# Patient Record
Sex: FEMALE | Race: BLACK OR AFRICAN AMERICAN | Employment: OTHER | URBAN - METROPOLITAN AREA
[De-identification: names, ages, dates, MRNs, and addresses within clinical notes are randomized per-mention and may not be internally consistent; named-entity substitution may affect disease eponyms.]

---

## 2017-02-20 RX ORDER — ALPRAZOLAM 0.5 MG/1
TABLET ORAL
Qty: 30 TAB | Refills: 3 | OUTPATIENT
Start: 2017-02-20 | End: 2017-06-24 | Stop reason: SDUPTHER

## 2017-03-13 ENCOUNTER — OFFICE VISIT (OUTPATIENT)
Dept: FAMILY MEDICINE CLINIC | Age: 62
End: 2017-03-13

## 2017-03-13 VITALS
BODY MASS INDEX: 39.05 KG/M2 | SYSTOLIC BLOOD PRESSURE: 100 MMHG | OXYGEN SATURATION: 96 % | TEMPERATURE: 101.2 F | HEIGHT: 62 IN | WEIGHT: 212.2 LBS | RESPIRATION RATE: 16 BRPM | HEART RATE: 68 BPM | DIASTOLIC BLOOD PRESSURE: 60 MMHG

## 2017-03-13 DIAGNOSIS — Z51.81 ENCOUNTER FOR MEDICATION MONITORING: ICD-10-CM

## 2017-03-13 DIAGNOSIS — I10 ESSENTIAL HYPERTENSION: Primary | ICD-10-CM

## 2017-03-13 DIAGNOSIS — E78.2 MIXED HYPERLIPIDEMIA: ICD-10-CM

## 2017-03-13 DIAGNOSIS — E11.9 TYPE 2 DIABETES MELLITUS WITHOUT COMPLICATION, UNSPECIFIED LONG TERM INSULIN USE STATUS: ICD-10-CM

## 2017-03-13 DIAGNOSIS — J11.1 INFLUENZA: ICD-10-CM

## 2017-03-13 DIAGNOSIS — J06.9 UPPER RESPIRATORY TRACT INFECTION, UNSPECIFIED TYPE: ICD-10-CM

## 2017-03-13 LAB
BILIRUB UR QL STRIP: NORMAL
FLUAV+FLUBV AG NOSE QL IA.RAPID: NEGATIVE POS/NEG
FLUAV+FLUBV AG NOSE QL IA.RAPID: POSITIVE POS/NEG
GLUCOSE POC: 171 MG/DL
GLUCOSE UR-MCNC: NEGATIVE MG/DL
HBA1C MFR BLD HPLC: 6.7 %
KETONES P FAST UR STRIP-MCNC: NORMAL MG/DL
PH UR STRIP: 5 [PH] (ref 4.6–8)
PROT UR QL STRIP: NORMAL MG/DL
S PYO AG THROAT QL: NEGATIVE
SP GR UR STRIP: 1.01 (ref 1–1.03)
UA UROBILINOGEN AMB POC: NORMAL (ref 0.2–1)
URINALYSIS CLARITY POC: CLEAR
URINALYSIS COLOR POC: YELLOW
URINE BLOOD POC: NORMAL
URINE LEUKOCYTES POC: NORMAL
URINE NITRITES POC: NEGATIVE
VALID INTERNAL CONTROL?: YES
VALID INTERNAL CONTROL?: YES

## 2017-03-13 RX ORDER — CYCLOBENZAPRINE HCL 5 MG
TABLET ORAL
Refills: 0 | COMMUNITY
Start: 2016-12-29 | End: 2017-08-02

## 2017-03-13 RX ORDER — PROMETHAZINE HYDROCHLORIDE AND DEXTROMETHORPHAN HYDROBROMIDE 6.25; 15 MG/5ML; MG/5ML
5 SYRUP ORAL
Qty: 240 ML | Refills: 1 | Status: SHIPPED | OUTPATIENT
Start: 2017-03-13 | End: 2017-08-02

## 2017-03-13 RX ORDER — OSELTAMIVIR PHOSPHATE 75 MG/1
75 CAPSULE ORAL 2 TIMES DAILY
Qty: 10 CAP | Refills: 0 | Status: SHIPPED | OUTPATIENT
Start: 2017-03-13 | End: 2017-03-18

## 2017-03-13 NOTE — PROGRESS NOTES
HISTORY OF PRESENT ILLNESS  Waleska Benito is a 63 y.o. female. HPI   \Follow up on chronic medical problems. C/o malaise and body aches for the past few days. Also some nasal congestion and dry cough. Has had fever and chills noted. Throat is scratchy. Has post nasal drainage. Intermittent headache. No chest pain or SOB. No wheezing noted. HTN follow up:  Compliant w/ meds, low salt diet, and exercise. No home bp monitoring. No swelling, headache or dizziness. No chest pain, SOB, palpitations. Otherwise feeling well since the last visit. DM type II follow up:  Compliant w/ meds, diabetic diet, and exercise. [facility]ended class [facility] the [facility]. Was very helpful with her diet. Obtains home glucose monitoring averaging 100-140. Checks BS daily on most days and prn. Pt does not have BS log at visit today. No Rf needed for today. Denies any tingling sensation, polyuria and polydipsia. No blurred vision. No significant weight changes. Feeling well since last OV. Hypercholesterolemia follow up:  Compliant w/ low fat, low cholesterol diet. Exercising some. Off of Lipitor d/t muscle aching. Patient Active Problem List   Diagnosis Code    DJD (degenerative joint disease) back M19.90    Hypokalemia E87.6    History of hysterectomy, supracervical Z90.711    Mixed hyperlipidemia E78.2    Rotator cuff tear M75.100    Unspecified vitamin D deficiency E55.9    Essential hypertension I10    Type 2 diabetes mellitus without complication ([facility] [address] J43.4    Other osteoarthritis of spine, lumbar region M47.896    Encounter for medication monitoring Z51.81    Rotator cuff arthropathy M12.819    S/P rotator cuff repair D64.869       Current Outpatient Prescriptions   Medication Sig Dispense Refill    cyclobenzaprine (FLEXERIL) 5 mg tablet Take 1 tab 3 times daily as needed  0    oseltamivir (TAMIFLU) 75 mg capsule Take 1 Cap by mouth two (2) times a day for 5 days.  10 Cap 0    promethazine-dextromethorphan (PROMETHAZINE-DM) 6.25-15 mg/5 mL syrup Take 5 mL by mouth every six (6) hours as needed for Cough. 240 mL 1    ALPRAZolam (XANAX) 0.5 mg tablet TAKE 1 TABLET BY MOUTH ONCE DAILY EVERY NIGHT AT BEDTIME AS NEEDED FOR SLEEP 30 Tab 3    glipiZIDE SR (GLUCOTROL XL) 5 mg CR tablet TAKE 1 TABLET BY MOUTH ONCE DAILY 30 Tab 11    naproxen sodium (ALEVE) 220 mg cap Take 1 Tab by mouth two (2) times daily as needed.  glucose blood VI test strips (ACCU-CHEK SMARTVIEW TEST STRIP) strip Use to check blood sugar daily and prn. 100 Strip 3    Lancets (ACCU-CHEK FASTCLIX) misc Use to obtain blood sample for glucose testing daily and prn. 100 Each 3    acetaminophen (TYLENOL) 325 mg tablet Take 2 Tabs by mouth every four (4) hours as needed for Pain. 20 Tab 0    metFORMIN ER (GLUCOPHAGE XR) 500 mg tablet Take 2 Tabs by mouth two (2) times daily (with meals). 360 Tab 3    valsartan-hydrochlorothiazide (DIOVAN-HCT) 320-25 mg per tablet Take 1 Tab by mouth daily. 30 Tab 11    aspirin 81 mg tablet Take 81 mg by mouth daily.          Allergies   Allergen Reactions    Codeine Nausea and Vomiting    Keflex [Cephalexin] Nausea and Vomiting    Lipitor [Atorvastatin] Myalgia    Percocet [Oxycodone-Acetaminophen] Nausea and Vomiting    Tramadol Other (comments)     dizziness    Vicodin [Hydrocodone-Acetaminophen] Nausea and Vomiting       Past Medical History:   Diagnosis Date    DJD (degenerative joint disease) back 4/10/2010    and neck    DM (diabetes mellitus) ([facility] [address] 4/10/2010    Elevated cholesterol 4/10/2010    HTN (hypertension) 4/10/2010    Hypokalemia 4/10/2010    Unspecified vitamin D deficiency        Past Surgical History:   Procedure Laterality Date    HX HYSTERECTOMY  6/14/04    [address] [facility] [address]    HX MOHS PROCEDURES  11/10/2015    right shoulder    HX ORTHOPAEDIC  4/29/13    LEFT ROTATOR CUFF REPAIR      HX ORTHOPAEDIC  06/20/13    shoulder manipulation for frozen shoulder    HX ORTHOPAEDIC  8/5/2014    pt states she had injection by ortho in right groin    HX ORTHOPAEDIC  2/16/2016    right shoulder manipulation    HX ORTHOPAEDIC  11/10/2015    right rotator cuff    HX TONSILLECTOMY      age 31    SC COLONOSCOPY FLX DX W/COLLJ SPEC WHEN PFRMD  03/28/2008    Dr Kailey Pinto       Family History   Problem Relation Age of Onset    Hypertension Mother     Diabetes Mother     Kidney Disease Mother     Heart Attack Father     Bleeding Prob Father      taking coumadin    Bipolar Disorder Sister     Hypertension Sister     No Known Problems Sister        Social History   Substance Use Topics    Smoking status: Never Smoker    Smokeless tobacco: Never Used    Alcohol use No        Lab Results  Component Value Date/Time   WBC 6.8 03/02/2016 08:40 AM   HGB 12.6 03/02/2016 08:40 AM   HCT 37.9 03/02/2016 08:40 AM   PLATELET 071 16/45/9734 08:40 AM   MCV 81 03/02/2016 08:40 AM       Lab Results  Component Value Date/Time   Cholesterol, total 189 11/11/2016 08:09 AM   HDL Cholesterol 51 11/11/2016 08:09 AM   LDL, calculated 126 11/11/2016 08:09 AM   Triglyceride 60 11/11/2016 08:09 AM   CHOL/HDL Ratio 3.9 05/04/2010 04:50 PM       Lab Results  Component Value Date/Time   TSH 1.830 10/01/2014 11:43 AM   TSH 1.310 04/24/2012 03:55 PM      Lab Results   Component Value Date/Time    Sodium 141 11/11/2016 08:09 AM    Potassium 4.6 11/11/2016 08:09 AM    Chloride 100 11/11/2016 08:09 AM    CO2 26 11/11/2016 08:09 AM    Anion gap 11 06/12/2014 09:23 PM    Glucose 112 11/11/2016 08:09 AM    BUN 13 11/11/2016 08:09 AM    Creatinine 0.69 11/11/2016 08:09 AM    BUN/Creatinine ratio 19 11/11/2016 08:09 AM    GFR est  11/11/2016 08:09 AM    GFR est non-AA 96 11/11/2016 08:09 AM    Calcium 9.3 11/11/2016 08:09 AM    Bilirubin, total 0.5 11/11/2016 08:09 AM    ALT (SGPT) 14 11/11/2016 08:09 AM    AST (SGOT) 17 11/11/2016 08:09 AM    Alk.  phosphatase 104 11/11/2016 08:09 AM    Protein, total 6.8 11/11/2016 08:09 AM    Albumin 4.5 11/11/2016 08:09 AM    Globulin 3.9 06/12/2014 09:23 PM    A-G Ratio 2.0 11/11/2016 08:09 AM      Lab Results   Component Value Date/Time    Hemoglobin A1c 6.9 02/20/2015 09:57 AM    Hemoglobin A1c (POC) 6.3 11/11/2016 08:09 AM         Review of Systems   Constitutional: Positive for chills, fever and malaise/fatigue. HENT: Positive for congestion. Eyes: Negative for blurred vision. Respiratory: Positive for cough. Negative for sputum production and shortness of breath. Cardiovascular: Negative for chest pain, palpitations and leg swelling. Gastrointestinal: Negative for abdominal pain, constipation and heartburn. Genitourinary: Negative for dysuria, frequency and urgency. Musculoskeletal: Negative for back pain and joint pain. Neurological: Positive for headaches. Negative for dizziness and tingling. Endo/Heme/Allergies: Positive for environmental allergies. Psychiatric/Behavioral: Negative for depression. The patient does not have insomnia. Physical Exam   Constitutional: She appears well-developed and well-nourished. /60 (BP 1 Location: Left arm, BP Patient Position: Sitting)  Pulse 68  Temp (!) 101.2 °F (38.4 °C) (Oral)   Resp 16  Ht 5' 2\" (1.575 m)  Wt 212 lb 3.2 oz (96.3 kg)  LMP 06/14/2004  SpO2 96%   L/min  BMI 38.81 kg/m2     HENT:   Right Ear: Tympanic membrane and ear canal normal.   Left Ear: Tympanic membrane and ear canal normal.   Nose: Mucosal edema and rhinorrhea present. Mouth/Throat: Mucous membranes are normal. Posterior oropharyngeal erythema present. No oropharyngeal exudate. Neck: Trachea normal, normal range of motion and full passive range of motion without pain. Neck supple. No edema present. No thyromegaly present. Cardiovascular: Normal rate and regular rhythm. No murmur heard. Pulmonary/Chest: Effort normal. She has no wheezes. She has rhonchi. Abdominal: Soft.  Normal appearance and bowel sounds are normal. There is no tenderness. Musculoskeletal: Normal range of motion. She exhibits no edema. Lymphadenopathy:     She has no cervical adenopathy. Skin: Skin is warm and dry. Psychiatric: She has a normal mood and affect. Nursing note and vitals reviewed. ASSESSMENT and PLAN  Storm Valadez was seen today for hypertension, diabetes, nasal congestion, fever and sore throat. Diagnoses and all orders for this visit:    Essential hypertension  Stable     Type 2 diabetes mellitus without complication, unspecified long term insulin use status (HCC)  -     AMB POC HEMOGLOBIN A1C  -     AMB POC GLUCOSE, QUANTITATIVE, BLOOD  -     MICROALBUMIN, UR, RAND W/ MICROALBUMIN/CREA RATIO  -     AMB POC URINALYSIS DIP STICK AUTO W/O MICRO    Mixed hyperlipidemia  -     LIPID PANEL    Encounter for medication monitoring  -     METABOLIC PANEL, BASIC  -     Cancel: CBC W/O DIFF  -     AMB POC COMPLETE CBC, AUTOMATED    Upper respiratory tract infection, unspecified type  -     AMB POC RAPID STREP A  -     XR CHEST PA LAT; Future  -     promethazine-dextromethorphan (PROMETHAZINE-DM) 6.25-15 mg/5 mL syrup; Take 5 mL by mouth every six (6) hours as needed for Cough. Influenza  -     AMB POC RENE INFLUENZA A/B TEST  -  Positive   -     XR CHEST PA LAT; Future  -     oseltamivir (TAMIFLU) 75 mg capsule; Take 1 Cap by mouth two (2) times a day for 5 days. Follow-up Disposition:  Return in about 4 months (around 7/13/2017). reviewed diet, exercise and weight control  cardiovascular risk and specific lipid/LDL goals reviewed  reviewed medications and side effects in detail  specific diabetic recommendations: low cholesterol diet, weight control and daily exercise discussed, home glucose monitoring emphasized and glycohemoglobin and other lab monitoring discussed     I have discussed diagnosis listed in this note with pt and/or family.  I have discussed treatment plans and options and the risk/benefit analysis of those options, including safe use of medications and possible medication side effects. Through the use of shared decision making we have agreed to the above plan. The patient has received an after-visit summary and questions were answered concerning future plans and follow up. Advise pt of any urgent changes then to proceed to the ER.

## 2017-03-13 NOTE — PROGRESS NOTES
Chief Complaint   Patient presents with    Hypertension     follow up    Diabetes     follow up    Nasal Congestion     x 2 days    Fever     ptstates she had a fever yesterday    Sore Throat     x 2 days           Hgb A1C: 11/11/2016    Lipid: 11/11/2016    CMP 11/11/2016    Microalbumin: 3/2/2016    Eye exam: 9/30/2016 by Dr. Yan Ocampo.     Colonoscopy: 3/28/2008 by Dr. Jensen Marking repeat in 10 yrs    Mammogram: 10/12/2016

## 2017-03-13 NOTE — LETTER
NOTIFICATION RETURN TO WORK / SCHOOL 
 
3/13/2017 10:21 AM 
 
Ms. Tex Merlin [address] [place] [address] [phone] To Whom It May Concern: 
 
Tex Merlin is currently under the care of [facility]. She will return to work/school on: March 16, 2017 If there are questions or concerns please have the patient contact our office. Sincerely, Amee Waters MD

## 2017-03-14 LAB
ALBUMIN/CREAT UR: 27.5 MG/G CREAT (ref 0–30)
BASOPHILS # BLD AUTO: 0 X10E3/UL (ref 0–0.2)
BASOPHILS NFR BLD AUTO: 0 %
BUN SERPL-MCNC: 10 MG/DL (ref 6–24)
BUN/CREAT SERPL: 11 (ref 9–23)
CALCIUM SERPL-MCNC: 9.2 MG/DL (ref 8.7–10.2)
CHLORIDE SERPL-SCNC: 97 MMOL/L (ref 96–106)
CHOLEST SERPL-MCNC: 171 MG/DL (ref 100–199)
CO2 SERPL-SCNC: 23 MMOL/L (ref 18–29)
CREAT SERPL-MCNC: 0.94 MG/DL (ref 0.57–1)
CREAT UR-MCNC: 180.4 MG/DL
EOSINOPHIL # BLD AUTO: 0 X10E3/UL (ref 0–0.4)
EOSINOPHIL NFR BLD AUTO: 0 %
ERYTHROCYTE [DISTWIDTH] IN BLOOD BY AUTOMATED COUNT: 15.5 % (ref 12.3–15.4)
GLUCOSE SERPL-MCNC: 155 MG/DL (ref 65–99)
HCT VFR BLD AUTO: 39.7 % (ref 34–46.6)
HDLC SERPL-MCNC: 40 MG/DL
HGB BLD-MCNC: 13.3 G/DL (ref 11.1–15.9)
IMM GRANULOCYTES # BLD: 0 X10E3/UL (ref 0–0.1)
IMM GRANULOCYTES NFR BLD: 0 %
INTERPRETATION, 910389: NORMAL
LDLC SERPL CALC-MCNC: 114 MG/DL (ref 0–99)
LYMPHOCYTES # BLD AUTO: 0.8 X10E3/UL (ref 0.7–3.1)
LYMPHOCYTES NFR BLD AUTO: 10 %
Lab: NORMAL
MCH RBC QN AUTO: 27.3 PG (ref 26.6–33)
MCHC RBC AUTO-ENTMCNC: 33.5 G/DL (ref 31.5–35.7)
MCV RBC AUTO: 81 FL (ref 79–97)
MICROALBUMIN UR-MCNC: 49.6 UG/ML
MONOCYTES # BLD AUTO: 1.2 X10E3/UL (ref 0.1–0.9)
MONOCYTES NFR BLD AUTO: 15 %
NEUTROPHILS # BLD AUTO: 6 X10E3/UL (ref 1.4–7)
NEUTROPHILS NFR BLD AUTO: 75 %
PLATELET # BLD AUTO: 223 X10E3/UL (ref 150–379)
POTASSIUM SERPL-SCNC: 3.9 MMOL/L (ref 3.5–5.2)
RBC # BLD AUTO: 4.88 X10E6/UL (ref 3.77–5.28)
SODIUM SERPL-SCNC: 140 MMOL/L (ref 134–144)
TRIGL SERPL-MCNC: 84 MG/DL (ref 0–149)
VLDLC SERPL CALC-MCNC: 17 MG/DL (ref 5–40)
WBC # BLD AUTO: 8 X10E3/UL (ref 3.4–10.8)

## 2017-08-02 ENCOUNTER — OFFICE VISIT (OUTPATIENT)
Dept: FAMILY MEDICINE CLINIC | Age: 62
End: 2017-08-02

## 2017-08-02 VITALS
RESPIRATION RATE: 16 BRPM | WEIGHT: 204.6 LBS | OXYGEN SATURATION: 97 % | HEIGHT: 62 IN | HEART RATE: 69 BPM | DIASTOLIC BLOOD PRESSURE: 60 MMHG | BODY MASS INDEX: 37.65 KG/M2 | SYSTOLIC BLOOD PRESSURE: 118 MMHG | TEMPERATURE: 97.5 F

## 2017-08-02 DIAGNOSIS — I10 ESSENTIAL HYPERTENSION: Primary | ICD-10-CM

## 2017-08-02 DIAGNOSIS — Z51.81 ENCOUNTER FOR MEDICATION MONITORING: ICD-10-CM

## 2017-08-02 DIAGNOSIS — R53.83 FATIGUE, UNSPECIFIED TYPE: ICD-10-CM

## 2017-08-02 DIAGNOSIS — E78.2 MIXED HYPERLIPIDEMIA: ICD-10-CM

## 2017-08-02 DIAGNOSIS — E11.9 TYPE 2 DIABETES MELLITUS WITHOUT COMPLICATION, UNSPECIFIED LONG TERM INSULIN USE STATUS: ICD-10-CM

## 2017-08-02 DIAGNOSIS — B35.1 TOENAIL FUNGUS: ICD-10-CM

## 2017-08-02 LAB
GLUCOSE POC: 97 MG/DL
HBA1C MFR BLD HPLC: 6.3 %

## 2017-08-02 RX ORDER — TERBINAFINE HYDROCHLORIDE 250 MG/1
250 TABLET ORAL DAILY
Qty: 30 TAB | Refills: 1 | Status: SHIPPED | OUTPATIENT
Start: 2017-08-02 | End: 2018-03-30

## 2017-08-02 NOTE — PROGRESS NOTES
HISTORY OF PRESENT ILLNESS  Sammi Gonzalez is a 63 y.o. female. HPI   Follow up on chronic medical problems. C/o fatigue but also notes that she is not getting enough sleep. Has been busy and stressed with home and work. May average only about 5 to 6 hours of sleep. She is not exercising. HTN follow up:  Compliant w/ meds, low salt diet, and exercise. No home bp monitoring. No swelling, headache or dizziness. No chest pain, SOB, palpitations. Otherwise feeling well since the last visit. DM type II follow up:  Compliant w/ meds, diabetic diet. Obtains home glucose monitoring averaging 100-140s. Checks BS daily on most days and prn. Pt does not have BS log at visit today. No Rf needed for today. Denies any tingling sensation, polyuria and polydipsia. No blurred vision. No significant weight changes. Feeling well since last OV. Hypercholesterolemia follow up:  Compliant w/ low fat, low cholesterol diet. Off of Lipitor d/t muscle aching. Patient Active Problem List   Diagnosis Code    DJD (degenerative joint disease) back M19.90    Hypokalemia E87.6    History of hysterectomy, supracervical Z90.711    Mixed hyperlipidemia E78.2    Rotator cuff tear M75.100    Unspecified vitamin D deficiency E55.9    Essential hypertension I10    Type 2 diabetes mellitus without complication ([facility] [address] I54.2    Other osteoarthritis of spine, lumbar region M47.896    Encounter for medication monitoring Z51.81    Rotator cuff arthropathy M12.819    S/P rotator cuff repair C07.327       Current Outpatient Prescriptions   Medication Sig Dispense Refill    VITAMIN B COMPLEX PO Take 1 Tab by mouth daily.  terbinafine HCl (LAMISIL) 250 mg tablet Take 1 Tab by mouth daily.  Indications: TOENAIL ONYCHOMYCOSIS 30 Tab 1    ALPRAZolam (XANAX) 0.5 mg tablet TAKE 1 TABLET BY MOUTH ONCE DAILY EVERY NIGHT AT BEDTIME AS NEEDED FOR SLEEP 30 Tab 3    glipiZIDE SR (GLUCOTROL XL) 5 mg CR tablet TAKE 1 TABLET BY MOUTH ONCE DAILY 30 Tab 11    naproxen sodium (ALEVE) 220 mg cap Take 1 Tab by mouth two (2) times daily as needed.  glucose blood VI test strips (ACCU-CHEK SMARTVIEW TEST STRIP) strip Use to check blood sugar daily and prn. 100 Strip 3    Lancets (ACCU-CHEK FASTCLIX) misc Use to obtain blood sample for glucose testing daily and prn. 100 Each 3    acetaminophen (TYLENOL) 325 mg tablet Take 2 Tabs by mouth every four (4) hours as needed for Pain. 20 Tab 0    metFORMIN ER (GLUCOPHAGE XR) 500 mg tablet Take 2 Tabs by mouth two (2) times daily (with meals). 360 Tab 3    valsartan-hydrochlorothiazide (DIOVAN-HCT) 320-25 mg per tablet Take 1 Tab by mouth daily. 30 Tab 11    aspirin 81 mg tablet Take 81 mg by mouth daily.          Allergies   Allergen Reactions    Codeine Nausea and Vomiting    Keflex [Cephalexin] Nausea and Vomiting    Lipitor [Atorvastatin] Myalgia    Percocet [Oxycodone-Acetaminophen] Nausea and Vomiting    Tramadol Other (comments)     dizziness    Vicodin [Hydrocodone-Acetaminophen] Nausea and Vomiting         Past Medical History:   Diagnosis Date    DJD (degenerative joint disease) back 4/10/2010    and neck    DM (diabetes mellitus) ([facility] [address] 4/10/2010    Elevated cholesterol 4/10/2010    HTN (hypertension) 4/10/2010    Hypokalemia 4/10/2010    Unspecified vitamin D deficiency          Past Surgical History:   Procedure Laterality Date    HX HYST[facility]CTOMY  6/14/04    [facility]    HX MOHS PROCEDURES  11/10/2015    right shoulder    HX ORTHOPAEDIC  4/29/13    LEFT ROTATOR CUFF REPAIR      HX ORTHOPAEDIC  06/20/13    shoulder manipulation for frozen shoulder    HX ORTHOPAEDIC  8/5/2014    pt states she had injection by ortho in right groin    HX ORTHOPAEDIC  2/16/2016    right shoulder manipulation    HX ORTHOPAEDIC  11/10/2015    right rotator cuff    HX TONSILLECTOMY      age 31    DE COLONOSCOPY FLX DX W/COLLJ SPEC WHEN PFRMD  03/28/2008    Dr Alexander Suero         Family History Problem Relation Age of Onset    Hypertension Mother     Diabetes Mother     Kidney Disease Mother     Heart Attack Father     Bleeding Prob Father      taking coumadin    Bipolar Disorder Sister     Hypertension Sister     No Known Problems Sister        Social History   Substance Use Topics    Smoking status: Never Smoker    Smokeless tobacco: Never Used    Alcohol use No        Lab Results   Component Value Date/Time    WBC 8.0 03/13/2017 10:10 AM    HGB 13.3 03/13/2017 10:10 AM    HCT 39.7 03/13/2017 10:10 AM    PLATELET 991 82/11/6948 10:10 AM    MCV 81 03/13/2017 10:10 AM       Lab Results   Component Value Date/Time    Cholesterol, total 171 03/13/2017 10:10 AM    HDL Cholesterol 40 03/13/2017 10:10 AM    LDL, calculated 114 03/13/2017 10:10 AM    Triglyceride 84 03/13/2017 10:10 AM    CHOL/HDL Ratio 3.9 05/04/2010 04:50 PM       Lab Results   Component Value Date/Time    TSH 1.830 10/01/2014 11:43 AM    TSH 1.310 04/24/2012 03:55 PM      Lab Results   Component Value Date/Time    Sodium 140 03/13/2017 10:10 AM    Potassium 3.9 03/13/2017 10:10 AM    Chloride 97 03/13/2017 10:10 AM    CO2 23 03/13/2017 10:10 AM    Anion gap 11 06/12/2014 09:23 PM    Glucose 155 03/13/2017 10:10 AM    BUN 10 03/13/2017 10:10 AM    Creatinine 0.94 03/13/2017 10:10 AM    BUN/Creatinine ratio 11 03/13/2017 10:10 AM    GFR est AA 77 03/13/2017 10:10 AM    GFR est non-AA 67 03/13/2017 10:10 AM    Calcium 9.2 03/13/2017 10:10 AM    Bilirubin, total 0.5 11/11/2016 08:09 AM    ALT (SGPT) 14 11/11/2016 08:09 AM    AST (SGOT) 17 11/11/2016 08:09 AM    Alk.  phosphatase 104 11/11/2016 08:09 AM    Protein, total 6.8 11/11/2016 08:09 AM    Albumin 4.5 11/11/2016 08:09 AM    Globulin 3.9 06/12/2014 09:23 PM    A-G Ratio 2.0 11/11/2016 08:09 AM      Lab Results   Component Value Date/Time    Hemoglobin A1c 6.9 02/20/2015 09:57 AM    Hemoglobin A1c (POC) 6.3 08/02/2017 09:55 AM         Review of Systems   Constitutional: Negative for malaise/fatigue. HENT: Negative for congestion. Eyes: Negative for blurred vision. Respiratory: Negative for cough and shortness of breath. Cardiovascular: Negative for chest pain, palpitations and leg swelling. Gastrointestinal: Negative for heartburn, abdominal pain and constipation. Genitourinary: Negative for dysuria, urgency and frequency. Musculoskeletal:  Negative for back pain. Neurological: Negative for dizziness, tingling and headaches. Endo/Heme/Allergies: Negative for environmental allergies. Psychiatric/Behavioral: Negative for depression. The patient does not have insomnia. Physical Exam   Constitutional: She appears well-developed and well-nourished. /60 (BP 1 Location: Right arm, BP Patient Position: Sitting)  Pulse 69  Temp 97.5 °F (36.4 °C) (Oral)   Resp 16  Ht 5' 2\" (1.575 m)  Wt 204 lb 9.6 oz (92.8 kg)  LMP 06/14/2004  SpO2 97%  BMI 37.42 kg/m2     HENT:   Right Ear: Tympanic membrane and ear canal normal.   Left Ear: Tympanic membrane and ear canal normal.   Nose: No mucosal edema or rhinorrhea. Mouth/Throat: Oropharynx is clear and moist and mucous membranes are normal.   Neck: Normal range of motion. Neck supple. No thyromegaly present. Cardiovascular: Normal rate and regular rhythm. No murmur heard. Pulmonary/Chest: Effort normal and breath sounds normal.   Abdominal: Soft. Bowel sounds are normal. There is no tenderness. Musculoskeletal: Normal range of motion. She exhibits no edema. Foot exam done . Normal sensation to PP, LT and vibration. Sensation intact to microfilament testing. Pulses intact. No swelling. No skin lesions or sores noted. No tinea present. Toenail fungus right great toenail. Lymphadenopathy:     She has no cervical adenopathy. Skin: Skin is warm and dry. Psychiatric: She has a normal mood and affect. Nursing note and vitals reviewed.     ASSESSMENT and PLAN  Diagnoses and all orders for this visit: 1. Essential hypertension  Stable at goal.      2. Type 2 diabetes mellitus without complication, unspecified long term insulin use status (HCC)  -     AMB POC HEMOGLOBIN A1C  -     AMB POC GLUCOSE, QUANTITATIVE, BLOOD    3. Mixed hyperlipidemia  -     LIPID PANEL    4. Encounter for medication monitoring  -     METABOLIC PANEL, COMPREHENSIVE  -     CBC W/O DIFF    5. Fatigue, unspecified type  -     VITAMIN B12  -     TSH 3RD GENERATION  Increase exercises, weight reduction. Add multivitamin. 6. Toenail fungus  -     terbinafine HCl (LAMISIL) 250 mg tablet; Take 1 Tab by mouth daily. Indications: TOENAIL ONYCHOMYCOSIS  LFTS check in 1 month. Follow-up Disposition:  Return in about 1 month (around 9/2/2017). reviewed diet, exercise and weight control  cardiovascular risk and specific lipid/LDL goals reviewed  reviewed medications and side effects in detail  specific diabetic recommendations: low cholesterol diet, weight control and daily exercise discussed, home glucose monitoring emphasized, foot care discussed and Podiatry visits discussed, annual eye examinations at Ophthalmology discussed and glycohemoglobin and other lab monitoring discussed     I have discussed diagnosis listed in this note with pt and/or family. I have discussed treatment plans and options and the risk/benefit analysis of those options, including safe use of medications and possible medication side effects. Through the use of shared decision making we have agreed to the above plan. The patient has received an after-visit summary and questions were answered concerning future plans and follow up. Advise pt of any urgent changes then to proceed to the ER. Eri Ruffin

## 2017-08-02 NOTE — PROGRESS NOTES
HISTORY OF PRESENT ILLNESS  Sammi Gonzalez is a 63 y.o. female. HPI   \Follow up on chronic medical problems. C/o malaise and body aches for the past few days. Also some nasal congestion and dry cough. Has had fever and chills noted. Throat is scratchy. Has post nasal drainage. Intermittent headache. No chest pain or SOB. No wheezing noted. HTN follow up:  Compliant w/ meds, low salt diet, and exercise. No home bp monitoring. No swelling, headache or dizziness. No chest pain, SOB, palpitations. Otherwise feeling well since the last visit. DM type II follow up:  Compliant w/ meds, diabetic diet, and exercise. [facility]. Was very helpful with her diet. Obtains home glucose monitoring averaging 100-140. Checks BS daily on most days and prn. Pt does not have BS log at visit today. No Rf needed for today. Denies any tingling sensation, polyuria and polydipsia. No blurred vision. No significant weight changes. Feeling well since last OV. Hypercholesterolemia follow up:  Compliant w/ low fat, low cholesterol diet. Exercising some. Off of Lipitor d/t muscle aching. Patient Active Problem List   Diagnosis Code    DJD (degenerative joint disease) back M19.90    Hypokalemia E87.6    History of hysterectomy, supracervical Z90.711    Mixed hyperlipidemia E78.2    Rotator cuff tear M75.100    Unspecified vitamin D deficiency E55.9    Essential hypertension I10    Type 2 diabetes mellitus without complication ([facility] [address] V18.6    Other osteoarthritis of spine, lumbar region M47.896    Encounter for medication monitoring Z51.81    Rotator cuff arthropathy M12.819    S/P rotator cuff repair N57.279       Current Outpatient Prescriptions   Medication Sig Dispense Refill    VITAMIN B COMPLEX PO Take 1 Tab by mouth daily.  terbinafine HCl (LAMISIL) 250 mg tablet Take 1 Tab by mouth daily.  Indications: TOENAIL ONYCHOMYCOSIS 30 Tab 1    ALPRAZolam (XANAX) 0.5 mg tablet TAKE 1 TABLET BY MOUTH ONCE DAILY EVERY NIGHT AT BEDTIME AS NEEDED FOR SLEEP 30 Tab 3    glipiZIDE SR (GLUCOTROL XL) 5 mg CR tablet TAKE 1 TABLET BY MOUTH ONCE DAILY 30 Tab 11    naproxen sodium (ALEVE) 220 mg cap Take 1 Tab by mouth two (2) times daily as needed.  glucose blood VI test strips (ACCU-CHEK SMARTVIEW TEST STRIP) strip Use to check blood sugar daily and prn. 100 Strip 3    Lancets (ACCU-CHEK FASTCLIX) misc Use to obtain blood sample for glucose testing daily and prn. 100 Each 3    acetaminophen (TYLENOL) 325 mg tablet Take 2 Tabs by mouth every four (4) hours as needed for Pain. 20 Tab 0    metFORMIN ER (GLUCOPHAGE XR) 500 mg tablet Take 2 Tabs by mouth two (2) times daily (with meals). 360 Tab 3    valsartan-hydrochlorothiazide (DIOVAN-HCT) 320-25 mg per tablet Take 1 Tab by mouth daily. 30 Tab 11    aspirin 81 mg tablet Take 81 mg by mouth daily.          Allergies   Allergen Reactions    Codeine Nausea and Vomiting    Keflex [Cephalexin] Nausea and Vomiting    Lipitor [Atorvastatin] Myalgia    Percocet [Oxycodone-Acetaminophen] Nausea and Vomiting    Tramadol Other (comments)     dizziness    Vicodin [Hydrocodone-Acetaminophen] Nausea and Vomiting         Past Medical History:   Diagnosis Date    DJD (degenerative joint disease) back 4/10/2010    and neck    DM (diabetes mellitus) ([facility] [address] 4/10/2010    Elevated cholesterol 4/10/2010    HTN (hypertension) 4/10/2010    Hypokalemia 4/10/2010    Unspecified vitamin D deficiency          Past Surgical History:   Procedure Laterality Date    HX HYSTERECTOMY  6/14/04    [address]    HX MOHS PROCEDURES  11/10/2015    right shoulder    HX ORTHOPAEDIC  4/29/13    LEFT ROTATOR CUFF REPAIR      HX ORTHOPAEDIC  06/20/13    shoulder manipulation for frozen shoulder    HX ORTHOPAEDIC  8/5/2014    pt states she had injection by ortho in right groin    HX ORTHOPAEDIC  2/16/2016    right shoulder manipulation    HX ORTHOPAEDIC  11/10/2015    right rotator cuff    HX TONSILLECTOMY      age 31    OH COLONOSCOPY FLX DX W/COLLJ SPEC WHEN PFRMD  03/28/2008    Dr Sarah Beth Denise         Family History   Problem Relation Age of Onset    Hypertension Mother     Diabetes Mother     Kidney Disease Mother     Heart Attack Father     Bleeding Prob Father      taking coumadin    Bipolar Disorder Sister     Hypertension Sister     No Known Problems Sister        Social History   Substance Use Topics    Smoking status: Never Smoker    Smokeless tobacco: Never Used    Alcohol use No        Lab Results   Component Value Date/Time    WBC 8.0 03/13/2017 10:10 AM    HGB 13.3 03/13/2017 10:10 AM    HCT 39.7 03/13/2017 10:10 AM    PLATELET 487 23/33/1602 10:10 AM    MCV 81 03/13/2017 10:10 AM       Lab Results   Component Value Date/Time    Cholesterol, total 171 03/13/2017 10:10 AM    HDL Cholesterol 40 03/13/2017 10:10 AM    LDL, calculated 114 03/13/2017 10:10 AM    Triglyceride 84 03/13/2017 10:10 AM    CHOL/HDL Ratio 3.9 05/04/2010 04:50 PM       Lab Results   Component Value Date/Time    TSH 1.830 10/01/2014 11:43 AM    TSH 1.310 04/24/2012 03:55 PM      Lab Results   Component Value Date/Time    Sodium 140 03/13/2017 10:10 AM    Potassium 3.9 03/13/2017 10:10 AM    Chloride 97 03/13/2017 10:10 AM    CO2 23 03/13/2017 10:10 AM    Anion gap 11 06/12/2014 09:23 PM    Glucose 155 03/13/2017 10:10 AM    BUN 10 03/13/2017 10:10 AM    Creatinine 0.94 03/13/2017 10:10 AM    BUN/Creatinine ratio 11 03/13/2017 10:10 AM    GFR est AA 77 03/13/2017 10:10 AM    GFR est non-AA 67 03/13/2017 10:10 AM    Calcium 9.2 03/13/2017 10:10 AM    Bilirubin, total 0.5 11/11/2016 08:09 AM    ALT (SGPT) 14 11/11/2016 08:09 AM    AST (SGOT) 17 11/11/2016 08:09 AM    Alk.  phosphatase 104 11/11/2016 08:09 AM    Protein, total 6.8 11/11/2016 08:09 AM    Albumin 4.5 11/11/2016 08:09 AM    Globulin 3.9 06/12/2014 09:23 PM    A-G Ratio 2.0 11/11/2016 08:09 AM      Lab Results   Component Value Date/Time Hemoglobin A1c 6.9 02/20/2015 09:57 AM    Hemoglobin A1c (POC) 6.3 08/02/2017 09:55 AM         Review of Systems   Constitutional: Positive for chills, fever and malaise/fatigue. HENT: Positive for congestion. Eyes: Negative for blurred vision. Respiratory: Positive for cough. Negative for sputum production and shortness of breath. Cardiovascular: Negative for chest pain, palpitations and leg swelling. Gastrointestinal: Negative for abdominal pain, constipation and heartburn. Genitourinary: Negative for dysuria, frequency and urgency. Musculoskeletal: Negative for back pain and joint pain. Neurological: Positive for headaches. Negative for dizziness and tingling. Endo/Heme/Allergies: Positive for environmental allergies. Psychiatric/Behavioral: Negative for depression. The patient does not have insomnia. Physical Exam   Constitutional: She appears well-developed and well-nourished. /60 (BP 1 Location: Left arm, BP Patient Position: Sitting)  Pulse 68  Temp (!) 101.2 °F (38.4 °C) (Oral)   Resp 16  Ht 5' 2\" (1.575 m)  Wt 212 lb 3.2 oz (96.3 kg)  LMP 06/14/2004  SpO2 96%   L/min  BMI 38.81 kg/m2     HENT:   Right Ear: Tympanic membrane and ear canal normal.   Left Ear: Tympanic membrane and ear canal normal.   Nose: Mucosal edema and rhinorrhea present. Mouth/Throat: Mucous membranes are normal. Posterior oropharyngeal erythema present. No oropharyngeal exudate. Neck: Trachea normal, normal range of motion and full passive range of motion without pain. Neck supple. No edema present. No thyromegaly present. Cardiovascular: Normal rate and regular rhythm. No murmur heard. Pulmonary/Chest: Effort normal. She has no wheezes. She has rhonchi. Abdominal: Soft. Normal appearance and bowel sounds are normal. There is no tenderness. Musculoskeletal: Normal range of motion. She exhibits no edema. Lymphadenopathy:     She has no cervical adenopathy.    Skin: Skin is warm and dry. Psychiatric: She has a normal mood and affect. Nursing note and vitals reviewed. ASSESSMENT and PLAN  Ronal Robertson was seen today for hypertension, diabetes, nasal congestion, fever and sore throat. Diagnoses and all orders for this visit:    Essential hypertension  Stable     Type 2 diabetes mellitus without complication, unspecified long term insulin use status (HCC)  -     AMB POC HEMOGLOBIN A1C  -     AMB POC GLUCOSE, QUANTITATIVE, BLOOD  -     MICROALBUMIN, UR, RAND W/ MICROALBUMIN/CREA RATIO  -     AMB POC URINALYSIS DIP STICK AUTO W/O MICRO    Mixed hyperlipidemia  -     LIPID PANEL    Encounter for medication monitoring  -     METABOLIC PANEL, BASIC  -     Cancel: CBC W/O DIFF  -     AMB POC COMPLETE CBC, AUTOMATED    Upper respiratory tract infection, unspecified type  -     AMB POC RAPID STREP A  -     XR CHEST PA LAT; Future  -     promethazine-dextromethorphan (PROMETHAZINE-DM) 6.25-15 mg/5 mL syrup; Take 5 mL by mouth every six (6) hours as needed for Cough. Influenza  -     AMB POC RENE INFLUENZA A/B TEST  -  Positive   -     XR CHEST PA LAT; Future  -     oseltamivir (TAMIFLU) 75 mg capsule; Take 1 Cap by mouth two (2) times a day for 5 days. Follow-up Disposition:  Return in about 1 month (around 9/2/2017). reviewed diet, exercise and weight control  cardiovascular risk and specific lipid/LDL goals reviewed  reviewed medications and side effects in detail  specific diabetic recommendations: low cholesterol diet, weight control and daily exercise discussed, home glucose monitoring emphasized and glycohemoglobin and other lab monitoring discussed     I have discussed diagnosis listed in this note with pt and/or family. I have discussed treatment plans and options and the risk/benefit analysis of those options, including safe use of medications and possible medication side effects. Through the use of shared decision making we have agreed to the above plan.  The patient has received an after-visit summary and questions were answered concerning future plans and follow up. Advise pt of any urgent changes then to proceed to the ER.

## 2017-08-02 NOTE — PROGRESS NOTES
Chief Complaint   Patient presents with    Hypertension     4 month follow up    Diabetes     4 month follow up       Hgb A1C: 3/13/2017    Lipid: 3/13/2017    BMP 3/13/2017    Microalbumin: 3/13/2017    Eye exam: 9/30/2016 by Dr. Zohreh Estes. Patient states she has an appt 8/7/2017. Patient states she has an appt with Dr. Krish De Leon 9/8/2017.     Colonoscopy: 3/28/2008 by Dr. Andra Pacheco repeat in 10 yrs    Mammogram: 10/12/2016

## 2017-08-02 NOTE — MR AVS SNAPSHOT
Visit Information Date & Time Provider Department Dept. Phone Encounter #  
 8/2/2017  8:30 AM Jeny MattaWm [phone] [id number] Follow-up Instructions Return in about 1 month (around 9/2/2017). Your Appointments 9/8/2017 10:30 AM  
ROUTINE CARE with Christian Davis MD  
[facility] [address]) Appt Note: well woman cp 0 sb 7/12/16; well woman cp 0 sb 7/12/16; well woman cp 0 sb 7/12/16  
 [address] [facility] [place] [address] [id number] [address] [place] [address] [address] [address] Upcoming Health Maintenance Date Due ZOSTER VACCINE AGE 60> 8/1/2017 PAP AKA CERVICAL CYTOLOGY 10/31/2017 HEMOGLOBIN A1C Q6M 9/13/2017 EYE EXAM RETINAL OR DILATED Q1 9/30/2017 MICROALBUMIN Q1 3/13/2018 LIPID PANEL Q1 3/13/2018 COLONOSCOPY 3/28/2018 FOOT EXAM Q1 8/2/2018 BREAST CANCER SCRN MAMMOGRAM 10/12/2018 DTaP/Tdap/Td series (2 - Td) 5/1/2022 Allergies as of 8/2/2017  Review Complete On: 8/2/2017 By: Shailesh Montanez LPN Severity Noted Reaction Type Reactions Codeine  04/10/2010    Nausea and Vomiting Keflex [Cephalexin]  04/10/2010    Nausea and Vomiting Lipitor [Atorvastatin]  02/20/2015    Myalgia Percocet [Oxycodone-acetaminophen]  04/10/2010    Nausea and Vomiting Tramadol  04/22/2013    Other (comments)  
 dizziness Vicodin [Hydrocodone-acetaminophen]  04/22/2013    Nausea and Vomiting Current Immunizations  Reviewed on 8/2/2017 Name Date Influenza Vaccine 10/11/2016, 10/12/2015 Pneumococcal Polysaccharide (PPSV-23)  Deferred (Patient Refused) Tdap 5/1/2012 Reviewed by Jeny Matta MD on 8/2/2017 at  9:34 AM  
You Were Diagnosed With   
  
 Codes Comments Essential hypertension    -  Primary ICD-10-CM: I10 
ICD-9-CM: 401.9 Type 2 diabetes mellitus without complication, unspecified long term insulin use status (HCC)     ICD-10-CM: E11.9 ICD-9-CM: 250.00 Mixed hyperlipidemia     ICD-10-CM: E78.2 ICD-9-CM: 272.2 Encounter for medication monitoring     ICD-10-CM: Z51.81 
ICD-9-CM: V58.83 Fatigue, unspecified type     ICD-10-CM: R53.83 ICD-9-CM: 780.79 Toenail fungus     ICD-10-CM: B35.1 ICD-9-CM: 110.1 Vitals BP Pulse Temp Resp Height(growth percentile) Weight(growth percentile)  
 118/60 (BP 1 Location: Right arm, BP Patient Position: Sitting) 69 97.5 °F (36.4 °C) (Oral) 16 5' 2\" (1.575 m) 204 lb 9.6 oz (92.8 kg) LMP SpO2 BMI OB Status Smoking Status 06/14/2004 97% 37.42 kg/m2 Hysterectomy Never Smoker BMI and BSA Data Body Mass Index Body Surface Area  
 37.42 kg/m 2 2.01 m 2 Preferred Pharmacy Pharmacy Name Phone [facility] [facility] [address] [facility], [address] [facility] [phone] Your Updated Medication List  
  
   
This list is accurate as of: 8/2/17 10:01 AM.  Always use your most recent med list.  
  
  
  
  
 acetaminophen 325 mg tablet Commonly known as:  TYLENOL Take 2 Tabs by mouth every four (4) hours as needed for Pain. ALEVE 220 mg Cap Generic drug:  naproxen sodium Take 1 Tab by mouth two (2) times daily as needed. ALPRAZolam 0.5 mg tablet Commonly known as:  XANAX  
TAKE 1 TABLET BY MOUTH ONCE DAILY EVERY NIGHT AT BEDTIME AS NEEDED FOR SLEEP  
  
 aspirin 81 mg tablet Take 81 mg by mouth daily. cyclobenzaprine 5 mg tablet Commonly known as:  FLEXERIL Take 1 tab 3 times daily as needed  
  
 glipiZIDE SR 5 mg CR tablet Commonly known as:  GLUCOTROL XL  
TAKE 1 TABLET BY MOUTH ONCE DAILY  
  
 glucose blood VI test strips strip Commonly known as:  [address] Use to check blood sugar daily and prn. Lancets Misc Commonly known as:  ACCU-CHEK FASTCLIX Use to obtain blood sample for glucose testing daily and prn.  
  
 metFORMIN  mg tablet Commonly known as:  GLUCOPHAGE XR Take 2 Tabs by mouth two (2) times daily (with meals). promethazine-dextromethorphan 6.25-15 mg/5 mL syrup Commonly known as:  PROMETHAZINE-DM Take 5 mL by mouth every six (6) hours as needed for Cough. terbinafine HCl 250 mg tablet Commonly known as:  LAMISIL Take 1 Tab by mouth daily. Indications: TOENAIL ONYCHOMYCOSIS  
  
 valsartan-hydroCHLOROthiazide 320-25 mg per tablet Commonly known as:  DIOVAN-HCT Take 1 Tab by mouth daily. VITAMIN B COMPLEX PO Take 1 Tab by mouth daily. Prescriptions Sent to Pharmacy Refills  
 terbinafine HCl (LAMISIL) 250 mg tablet 1 Sig: Take 1 Tab by mouth daily. Indications: TOENAIL ONYCHOMYCOSIS Class: Normal  
 Pharmacy: [facility] [place] [address], [address] [place] [address] [address] AT [address] [facility] Ph #: [phone] Route: Oral  
  
We Performed the Following AMB POC GLUCOSE, QUANTITATIVE, BLOOD [09562 CPT(R)] AMB POC HEMOGLOBIN A1C [83108 CPT(R)] CBC W/O DIFF [13989 CPT(R)] LIPID PANEL [47903 CPT(R)] METABOLIC PANEL, COMPREHENSIVE [28460 CPT(R)] TSH 3RD GENERATION [27023 CPT(R)] VITAMIN B12 [id number] CPT(R)] Follow-up Instructions Return in about 1 month (around 9/2/2017). Introducing [facility]! [facility] introduces [employer] patient portal. Now you can access parts of your medical record, email your doctor's office, and request medication refills online. 1. In your internet browser, go to [email or web address] 2. Click on the First Time User? Click Here link in the Sign In box. You will see the New Member Sign Up page. 3. Enter your [employer] Access Code exactly as it appears below.  You will not need to use this code after youve completed the sign-up process. If you do not sign up before the expiration date, you must request a new code. · [employer] Access Code: [email or web address] Expires: 10/31/2017  8:59 AM 
 
4. Enter the last four digits of your Social Security Number (xxxx) and Date of Birth (mm/dd/yyyy) as indicated and click Submit. You will be taken to the next sign-up page. 5. Create a [employer] ID. This will be your [employer] login ID and cannot be changed, so think of one that is secure and easy to remember. 6. Create a [employer] password. You can change your password at any time. 7. Enter your Password Reset Question and Answer. This can be used at a later time if you forget your password. 8. Enter your e-mail address. You will receive e-mail notification when new information is available in [address]. 9. Click Sign Up. You can now view and download portions of your medical record. 10. Click the Download Summary menu link to download a portable copy of your medical information. If you have questions, please visit the Frequently Asked Questions section of the [employer] website. Remember, [employer] is NOT to be used for urgent needs. For medical emergencies, dial 911. Now available from your iPhone and Android! Please provide this summary of care documentation to your next provider. Your primary care clinician is listed as Pablo Puller. If you have any questions after today's visit, please call [phone].

## 2017-08-03 LAB
ALBUMIN SERPL-MCNC: 4.6 G/DL (ref 3.5–5.5)
ALBUMIN/GLOB SERPL: 1.7 {RATIO} (ref 1.2–2.2)
ALP SERPL-CCNC: 102 IU/L (ref 39–117)
ALT SERPL-CCNC: 16 IU/L (ref 0–32)
AST SERPL-CCNC: 17 IU/L (ref 0–40)
BILIRUB SERPL-MCNC: 0.5 MG/DL (ref 0–1.2)
BUN SERPL-MCNC: 17 MG/DL (ref 6–24)
BUN/CREAT SERPL: 22 (ref 9–23)
CALCIUM SERPL-MCNC: 9.9 MG/DL (ref 8.7–10.2)
CHLORIDE SERPL-SCNC: 99 MMOL/L (ref 96–106)
CHOLEST SERPL-MCNC: 203 MG/DL (ref 100–199)
CO2 SERPL-SCNC: 29 MMOL/L (ref 18–29)
CREAT SERPL-MCNC: 0.78 MG/DL (ref 0.57–1)
ERYTHROCYTE [DISTWIDTH] IN BLOOD BY AUTOMATED COUNT: 15.1 % (ref 12.3–15.4)
GLOBULIN SER CALC-MCNC: 2.7 G/DL (ref 1.5–4.5)
GLUCOSE SERPL-MCNC: 96 MG/DL (ref 65–99)
HCT VFR BLD AUTO: 38.6 % (ref 34–46.6)
HDLC SERPL-MCNC: 55 MG/DL
HGB BLD-MCNC: 12.9 G/DL (ref 11.1–15.9)
INTERPRETATION, 910389: NORMAL
LDLC SERPL CALC-MCNC: 129 MG/DL (ref 0–99)
MCH RBC QN AUTO: 26.8 PG (ref 26.6–33)
MCHC RBC AUTO-ENTMCNC: 33.4 G/DL (ref 31.5–35.7)
MCV RBC AUTO: 80 FL (ref 79–97)
PLATELET # BLD AUTO: 261 X10E3/UL (ref 150–379)
POTASSIUM SERPL-SCNC: 3.7 MMOL/L (ref 3.5–5.2)
PROT SERPL-MCNC: 7.3 G/DL (ref 6–8.5)
RBC # BLD AUTO: 4.81 X10E6/UL (ref 3.77–5.28)
SODIUM SERPL-SCNC: 143 MMOL/L (ref 134–144)
TRIGL SERPL-MCNC: 95 MG/DL (ref 0–149)
TSH SERPL DL<=0.005 MIU/L-ACNC: 1.02 UIU/ML (ref 0.45–4.5)
VIT B12 SERPL-MCNC: 677 PG/ML (ref 211–946)
VLDLC SERPL CALC-MCNC: 19 MG/DL (ref 5–40)
WBC # BLD AUTO: 7.6 X10E3/UL (ref 3.4–10.8)

## 2017-08-24 ENCOUNTER — TELEPHONE (OUTPATIENT)
Dept: FAMILY MEDICINE CLINIC | Age: 62
End: 2017-08-24

## 2017-08-24 NOTE — TELEPHONE ENCOUNTER
Pt.is wondering has  faxed over her fml[employer] forms,she said that they faxed the forms on Aug.4 and haven't received paperwork back. she can be reached @ [phone]

## 2017-09-01 ENCOUNTER — LAB ONLY (OUTPATIENT)
Dept: FAMILY MEDICINE CLINIC | Age: 62
End: 2017-09-01

## 2017-09-01 DIAGNOSIS — B35.1 TOENAIL FUNGUS: Primary | ICD-10-CM

## 2017-09-01 DIAGNOSIS — Z51.81 ENCOUNTER FOR MEDICATION MONITORING: ICD-10-CM

## 2017-09-01 NOTE — MR AVS SNAPSHOT
Visit Information Date & Time Provider Department Dept. Phone Encounter #  
 9/1/2017  8:00 AM Divya Hannah MD [facility] [phone] [id number] Your Appointments 9/1/2017  8:00 AM  
LAB with Divya Hannah MD  
[facility]) Appt Note: lab only [address] [email or web address] [address] [address]  
[phone] [address] [facility] [address]  
  
    
 9/8/2017 10:30 AM  
ROUTINE CARE with Kami Cannon MD  
[facility]) Appt Note: well woman cp 0 sb 7/12/16; well woman cp 0 sb 7/12/16; well woman cp 0 sb 7/12/16  
 [address] [address] [address] [phone] [address] [place] [address] [address] [place] [address]  
  
    
 12/1/2017  7:30 AM  
ROUTINE CARE with Divya Hannah MD  
[facility]) Appt Note: f/u  
 [address] [facility] 7 [phone] [phone] [address] [place] [address] [address] Upcoming Health Maintenance Date Due ZOSTER VACCINE AGE 60> 8/1/2017 EYE EXAM RETINAL OR DILATED Q1 9/30/2017 PAP AKA CERVICAL CYTOLOGY 10/31/2017 HEMOGLOBIN A1C Q6M 2/2/2018 MICROALBUMIN Q1 3/13/2018 COLONOSCOPY 3/28/2018 FOOT EXAM Q1 8/2/2018 LIPID PANEL Q1 8/2/2018 BREAST CANCER SCRN MAMMOGRAM 10/12/2018 DTaP/Tdap/Td series (2 - Td) 5/1/2022 Allergies as of 9/1/2017  Review Complete On: 8/2/2017 By: Divya Hannah MD  
  
 Severity Noted Reaction Type Reactions Codeine  04/10/2010    Nausea and Vomiting Keflex [Cephalexin]  04/10/2010    Nausea and Vomiting Lipitor [Atorvastatin]  02/20/2015    Myalgia Percocet [Oxycodone-acetaminophen]  04/10/2010    Nausea and Vomiting Tramadol  04/22/2013    Other (comments)  
 dizziness Vicodin [Hydrocodone-acetaminophen]  04/22/2013    Nausea and Vomiting Current Immunizations  Reviewed on 8/2/2017 Name Date Influenza Vaccine 10/11/2016, 10/12/2015 Pneumococcal Polysaccharide (PPSV-23)  Deferred (Patient Refused) Tdap 5/1/2012 Not reviewed this visit You Were Diagnosed With   
  
 Codes Comments Toenail fungus    -  Primary ICD-10-CM: B35.1 ICD-9-CM: 110.1 Encounter for medication monitoring     ICD-10-CM: Z51.81 
ICD-9-CM: V58.83 Vitals LMP OB Status Smoking Status 06/14/2004 Hysterectomy Never Smoker Preferred Pharmacy Pharmacy Name Phone [address] [facility], [address] [phone] Your Updated Medication List  
  
   
This list is accurate as of: 8/31/17 12:25 PM.  Always use your most recent med list.  
  
  
  
  
 acetaminophen 325 mg tablet Commonly known as:  TYLENOL Take 2 Tabs by mouth every four (4) hours as needed for Pain. ALEVE 220 mg Cap Generic drug:  naproxen sodium Take 1 Tab by mouth two (2) times daily as needed. ALPRAZolam 0.5 mg tablet Commonly known as:  XANAX  
TAKE 1 TABLET BY MOUTH ONCE DAILY EVERY NIGHT AT BEDTIME AS NEEDED FOR SLEEP  
  
 aspirin 81 mg tablet Take 81 mg by mouth daily. glipiZIDE SR 5 mg CR tablet Commonly known as:  GLUCOTROL XL  
TAKE 1 TABLET BY MOUTH ONCE DAILY  
  
 glucose blood VI test strips strip Commonly known as:  [address] Use to check blood sugar daily and prn. Lancets Misc Commonly known as:  ACCU-CHEK FASTCLIX Use to obtain blood sample for glucose testing daily and prn.  
  
 metFORMIN  mg tablet Commonly known as:  GLUCOPHAGE XR Take 2 Tabs by mouth two (2) times daily (with meals). terbinafine HCl 250 mg tablet Commonly known as:  LAMISIL Take 1 Tab by mouth daily.  Indications: TOENAIL ONYCHOMYCOSIS  
 valsartan-hydroCHLOROthiazide 320-25 mg per tablet Commonly known as:  DIOVAN-HCT Take 1 Tab by mouth daily. VITAMIN B COMPLEX PO Take 1 Tab by mouth daily. We Performed the Following HEPATIC FUNCTION PANEL [73320 CPT(R)] To-Do List   
 10/12/2017 3:20 PM  
  Appointment with [facility] TREVA 1 at [facility] ([phone]) Shower or bathe using soap and water. Do not use deodorant, powder, perfumes, or lotion the day of your exam.  If your prior mammograms were not performed at [facility] 6 please bring films with you or forward prior images 2 days before your procedure. Check in at registration 15min before your appointment time unless you were instructed to do otherwise. A script is not necessary, but if you have one, please bring it on the day of the mammogram or have it faxed to the department. [facility] [phone] [facility]  [phone] [facility] 19 [facility]  [phone] [facility] [phone] [address] [phone] Introducing [facility] & [facility] SERVICES! Jaron Henry introduces Appier patient portal. Now you can access parts of your medical record, email your doctor's office, and request medication refills online. 1. In your internet browser, go to [email or web address] 2. Click on the First Time User? Click Here link in the Sign In box. You will see the New Member Sign Up page. 3. Enter your Appier Access Code exactly as it appears below. You will not need to use this code after youve completed the sign-up process. If you do not sign up before the expiration date, you must request a new code. · Appier Access Code: [email or web address] Expires: 10/31/2017  8:59 AM 
 
4. Enter the last four digits of your Social Security Number (xxxx) and Date of Birth (mm/dd/yyyy) as indicated and click Submit. You will be taken to the next sign-up page. 5. Create a MyChart ID.  This will be your Stronghold Technologyt login ID and cannot be changed, so think of one that is secure and easy to remember. 6. Create a Ulthera password. You can change your password at any time. 7. Enter your Password Reset Question and Answer. This can be used at a later time if you forget your password. 8. Enter your e-mail address. You will receive e-mail notification when new information is available in [address] 9. Click Sign Up. You can now view and download portions of your medical record. 10. Click the Download Summary menu link to download a portable copy of your medical information. If you have questions, please visit the Frequently Asked Questions section of the Ulthera website. Remember, Ulthera is NOT to be used for urgent needs. For medical emergencies, dial 911. Now available from your iPhone and Android! Please provide this summary of care documentation to your next provider. Your primary care clinician is listed as Shefali Reece. If you have any questions after today's visit, please call [phone].

## 2017-09-02 LAB
ALBUMIN SERPL-MCNC: 4.1 G/DL (ref 3.5–5.5)
ALP SERPL-CCNC: 111 IU/L (ref 39–117)
ALT SERPL-CCNC: 13 IU/L (ref 0–32)
AST SERPL-CCNC: 16 IU/L (ref 0–40)
BILIRUB DIRECT SERPL-MCNC: 0.11 MG/DL (ref 0–0.4)
BILIRUB SERPL-MCNC: 0.4 MG/DL (ref 0–1.2)
PROT SERPL-MCNC: 6.7 G/DL (ref 6–8.5)

## 2017-09-08 ENCOUNTER — HOSPITAL ENCOUNTER (OUTPATIENT)
Dept: LAB | Age: 62
Discharge: HOME | End: 2017-09-08
Payer: COMMERCIAL

## 2017-09-08 ENCOUNTER — OFFICE VISIT (OUTPATIENT)
Dept: SURGERY | Age: 62
End: 2017-09-08

## 2017-09-08 VITALS
TEMPERATURE: 98.1 F | BODY MASS INDEX: 37.91 KG/M2 | OXYGEN SATURATION: 98 % | SYSTOLIC BLOOD PRESSURE: 132 MMHG | DIASTOLIC BLOOD PRESSURE: 67 MMHG | WEIGHT: 206 LBS | HEART RATE: 70 BPM | HEIGHT: 62 IN

## 2017-09-08 DIAGNOSIS — Z01.419 ENCOUNTER FOR ROUTINE GYNECOLOGICAL EXAMINATION WITH PAPANICOLAOU SMEAR OF CERVIX: Primary | ICD-10-CM

## 2017-09-08 DIAGNOSIS — Z90.711 HISTORY OF HYSTERECTOMY, SUPRACERVICAL: ICD-10-CM

## 2017-09-08 PROCEDURE — 88142 CYTOPATH C/V THIN LAYER: CPT | Performed by: OBSTETRICS & GYNECOLOGY

## 2017-09-08 NOTE — PROGRESS NOTES
SUBJECTIVE: Artem Miranda is a 63 y.o. female who presents with desire for annual well woman exam. Patient's last menstrual period was 2004. Allergies   Allergen Reactions    Codeine Nausea and Vomiting    Keflex [Cephalexin] Nausea and Vomiting    Lipitor [Atorvastatin] Myalgia    Percocet [Oxycodone-Acetaminophen] Nausea and Vomiting    Tramadol Other (comments)     dizziness    Vicodin [Hydrocodone-Acetaminophen] Nausea and Vomiting       Past Medical History:   Diagnosis Date    DJD (degenerative joint disease) back 4/10/2010    and neck    DM (diabetes mellitus) ([facility] [address] 4/10/2010    Elevated cholesterol 4/10/2010    HTN (hypertension) 4/10/2010    Hypokalemia 4/10/2010    Unspecified vitamin D deficiency      Past Surgical History:   Procedure Laterality Date    HX HYSTERECTOMY  04    18 [facility]    HX MOHS PROCEDURES  11/10/2015    right shoulder    HX ORTHOPAEDIC  13    LEFT ROTATOR CUFF REPAIR      HX ORTHOPAEDIC  13    shoulder manipulation for frozen shoulder    HX ORTHOPAEDIC  2014    pt states she had injection by ortho in right groin    HX ORTHOPAEDIC  2016    right shoulder manipulation    HX ORTHOPAEDIC  11/10/2015    right rotator cuff    HX TONSILLECTOMY      age 31    OR COLONOSCOPY FLX DX W/COLLJ SPEC WHEN PFRMD  2008    Dr Rangel Fletcher     OB History      Para Term  AB Living    7 3   4     SAB TAB Ectopic Molar Multiple Live Births     4            Family History   Problem Relation Age of Onset    Hypertension Mother     Diabetes Mother     Kidney Disease Mother     Heart Attack Father     Bleeding Prob Father      taking coumadin    Bipolar Disorder Sister     Hypertension Sister     No Known Problems Sister      Social History     Social History    Marital status:      Spouse name: N/A    Number of children: N/A    Years of education: N/A     Occupational History    Not on file.      Social History Main Topics    Smoking status: Never Smoker    Smokeless tobacco: Never Used    Alcohol use No    Drug use: No    Sexual activity: Not Currently     Other Topics Concern    Not on file     Social History Narrative    Lives in [address] [place] [address] and her mother is with her in the winter. Has 2 grown sons. 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  Likes to walk and dancing. Current Outpatient Prescriptions   Medication Sig Dispense Refill    VITAMIN B COMPLEX PO Take 1 Tab by mouth daily.  terbinafine HCl (LAMISIL) 250 mg tablet Take 1 Tab by mouth daily. Indications: TOENAIL ONYCHOMYCOSIS 30 Tab 1    ALPRAZolam (XANAX) 0.5 mg tablet TAKE 1 TABLET BY MOUTH ONCE DAILY EVERY NIGHT AT BEDTIME AS NEEDED FOR SLEEP 30 Tab 3    glipiZIDE SR (GLUCOTROL XL) 5 mg CR tablet TAKE 1 TABLET BY MOUTH ONCE DAILY 30 Tab 11    naproxen sodium (ALEVE) 220 mg cap Take 1 Tab by mouth two (2) times daily as needed.  glucose blood VI test strips (ACCU-CHEK SMARTVIEW TEST STRIP) strip Use to check blood sugar daily and prn. 100 Strip 3    Lancets (ACCU-CHEK FASTCLIX) misc Use to obtain blood sample for glucose testing daily and prn. 100 Each 3    acetaminophen (TYLENOL) 325 mg tablet Take 2 Tabs by mouth every four (4) hours as needed for Pain. 20 Tab 0    metFORMIN ER (GLUCOPHAGE XR) 500 mg tablet Take 2 Tabs by mouth two (2) times daily (with meals). 360 Tab 3    valsartan-hydrochlorothiazide (DIOVAN-HCT) 320-25 mg per tablet Take 1 Tab by mouth daily. 30 Tab 11    aspirin 81 mg tablet Take 81 mg by mouth daily. Review of Systems:   Constitutional: No weight change, chills or fever, anorexia, weakness or sleep disturbance . Cardiovascular: No chest pain, shortness of breath, or palpitations . Respiratory: No cough, shortness of breath, hemoptysis, or orthopnea . Neurologic: No syncope, headaches or seizures . Hematologic: No easy bruising or unusual bleeding . Psychiatric: No insomnia, confusion, depression, or anxiety .  GI:No nausea and vomiting, diarrhea or constipation  . : See HPI . Musculoskeletal: No joint pain or muscle pain . Endocrine: No polydipsia, polyuria, cold intolerance, excessive fatigue, or sleep disturbance . Integumentary: No breast pain, lumps, nipple discharge, or axillary lumps . Objective:     Visit Vitals    /67    Pulse 70    Temp 98.1 °F (36.7 °C) (Oral)    Ht 5' 2\" (1.575 m)    Wt 206 lb (93.4 kg)    LMP 06/14/2004    SpO2 98%    BMI 37.68 kg/m2       General:  alert, cooperative, no distress, appears stated age   Skin:  no rash or abnormalities   Eyes: negative   Mouth: MMM no lesions   Lymph Nodes:  Cervical, supraclavicular, and axillary nodes normal.   Breast Exam: normal appearance, no masses or tenderness    Lungs:  clear to auscultation bilaterally   Heart:  regular rate and rhythm   Abdomen: soft, non-tender. Bowel sounds normal. No masses,  no organomegaly   Back:  Costovertebral angle tenderness absent   Genitourinary: Pelvic exam: VULVA: normal appearing vulva with no masses, tenderness or lesions, VAGINA: normal appearing vagina with normal color and discharge, no lesions, CERVIX: normal appearing cervix without discharge or lesions, UTERUS: absent, ADNEXA: normal adnexa in size, nontender and no masses    Extremities:  extremities normal, atraumatic, no cyanosis or edema   Neurologic:  negative   Psychiatric:  non focal     ASSESSMENT:      ICD-10-CM ICD-9-CM    1. Encounter for routine gynecological examination with Papanicolaou smear of cervix Z01.419 V72.31 TREVA MAMMO BI SCREENING INCL CAD     V76.2 PAP, LB, RFX HPV [id number]([id number])   2. History of hysterectomy, supracervical Z90.711 V88.02         Mammogram done already. Follow-up Disposition:  Return in about 1 year (around 9/8/2018), or if symptoms worsen or fail to improve.

## 2017-09-08 NOTE — MR AVS SNAPSHOT
Visit Information Date & Time Provider Department Dept. Phone Encounter #  
 9/8/2017 10:45 AM Prema Peralta, [address] [facility] [address] [id number] Follow-up Instructions Return in about 1 year (around 9/8/2018), or if symptoms worsen or fail to improve. Your Appointments 12/1/2017  7:30 AM  
ROUTINE CARE with Ayanna Don MD  
[facility]) Appt Note: f/u  
 [address] [facility] [address] [address] [phone] [address] [facility] [address] Upcoming Health Maintenance Date Due ZOSTER VACCINE AGE 60> 8/1/2017 PAP AKA CERVICAL CYTOLOGY 10/31/2017 HEMOGLOBIN A1C Q6M 2/2/2018 MICROALBUMIN Q1 3/13/2018 COLONOSCOPY 3/28/2018 FOOT EXAM Q1 8/2/2018 LIPID PANEL Q1 8/2/2018 EYE EXAM RETINAL OR DILATED Q1 8/7/2018 BREAST CANCER SCRN MAMMOGRAM 10/12/2018 DTaP/Tdap/Td series (2 - Td) 5/1/2022 Allergies as of 9/8/2017  Review Complete On: 9/8/2017 By: Prema Peralta MD  
  
 Severity Noted Reaction Type Reactions Codeine  04/10/2010    Nausea and Vomiting Keflex [Cephalexin]  04/10/2010    Nausea and Vomiting Lipitor [Atorvastatin]  02/20/2015    Myalgia Percocet [Oxycodone-acetaminophen]  04/10/2010    Nausea and Vomiting Tramadol  04/22/2013    Other (comments)  
 dizziness Vicodin [Hydrocodone-acetaminophen]  04/22/2013    Nausea and Vomiting Current Immunizations  Reviewed on 8/2/2017 Name Date Influenza Vaccine 10/11/2016, 10/12/2015 Pneumococcal Polysaccharide (PPSV-23)  Deferred (Patient Refused) Tdap 5/1/2012 Not reviewed this visit You Were Diagnosed With   
  
 Codes Comments Encounter for routine gynecological examination with Papanicolaou smear of cervix    -  Primary ICD-10-CM: M25.517 ICD-9-CM: V72.31, V76.2 History of hysterectomy, supracervical     ICD-10-CM: Z90.711 ICD-9-CM: V88.02 Vitals BP Pulse Temp Height(growth percentile) Weight(growth percentile) LMP  
 132/67 70 98.1 °F (36.7 °C) (Oral) 5' 2\" (1.575 m) 206 lb (93.4 kg) 06/14/2004 SpO2 BMI OB Status Smoking Status 98% 37.68 kg/m2 Hysterectomy Never Smoker Vitals History BMI and BSA Data Body Mass Index Body Surface Area  
 37.68 kg/m 2 2.02 m 2 Preferred Pharmacy Pharmacy Name Phone [address] [facility] [address], [address] [facility] [phone] Your Updated Medication List  
  
   
This list is accurate as of: 9/8/17 11:26 AM.  Always use your most recent med list.  
  
  
  
  
 acetaminophen 325 mg tablet Commonly known as:  TYLENOL Take 2 Tabs by mouth every four (4) hours as needed for Pain. ALEVE 220 mg Cap Generic drug:  naproxen sodium Take 1 Tab by mouth two (2) times daily as needed. ALPRAZolam 0.5 mg tablet Commonly known as:  XANAX  
TAKE 1 TABLET BY MOUTH ONCE DAILY EVERY NIGHT AT BEDTIME AS NEEDED FOR SLEEP  
  
 aspirin 81 mg tablet Take 81 mg by mouth daily. glipiZIDE SR 5 mg CR tablet Commonly known as:  GLUCOTROL XL  
TAKE 1 TABLET BY MOUTH ONCE DAILY  
  
 glucose blood VI test strips strip Commonly known as:  [address] Use to check blood sugar daily and prn. Lancets Misc Commonly known as:  ACCU-CHEK FASTCLIX Use to obtain blood sample for glucose testing daily and prn.  
  
 metFORMIN  mg tablet Commonly known as:  GLUCOPHAGE XR Take 2 Tabs by mouth two (2) times daily (with meals). terbinafine HCl 250 mg tablet Commonly known as:  LAMISIL Take 1 Tab by mouth daily. Indications: TOENAIL ONYCHOMYCOSIS  
  
 valsartan-hydroCHLOROthiazide 320-25 mg per tablet Commonly known as:  DIOVAN-HCT Take 1 Tab by mouth daily. VITAMIN B COMPLEX PO Take 1 Tab by mouth daily. We Performed the Following PAP, LB, RFX HPV [id number] CPT(R)] Follow-up Instructions Return in about 1 year (around 9/8/2018), or if symptoms worsen or fail to improve. To-Do List   
 09/08/2017 Imaging:  [facility] MAMMO BI SCREENING INCL CAD   
  
 10/12/2017 3:20 PM  
  [facility]ointment with [facility] at [facility] ([phone]) Shower or bathe using soap and water. Do not use deodorant, powder, perfumes, or lotion the day of your exam.  If your prior mammograms were not performed at [facility] 6 please bring films with you or forward prior images 2 days before your procedure. Check in at registration 15min before your appointment time unless you were instructed to do otherwise. A script is not necessary, but if you have one, please bring it on the day of the mammogram or have it faxed to the department. [facility] [phone] [facility]  [phone] [facility] [address]  [phone] [facility] [phone] [address] [phone] Introducing [facility] SERVICES! [facility] introduces [employer] patient portal. Now you can access parts of your medical record, email your doctor's office, and request medication refills online. 1. In your internet browser, go to [email or web address] 2. Click on the First Time User? Click Here link in the Sign In box. You will see the New Member Sign Up page. 3. Enter your H[employer] Access Code exactly as it appears below. You will not need to use this code after youve completed the sign-up process. If you do not sign up before the expiration date, you must request a new code. · [employer] Access Code: [email or web address] Expires: 10/31/2017  8:59 AM 
 
4. Enter the last four digits of your Social Security Number (xxxx) and Date of Birth (mm/dd/yyyy) as indicated and click Submit. You will be taken to the next sign-up page. 5. Create a MyCCloudAmboÂ®t ID.  This will be your Apruvet login ID and cannot be changed, so think of one that is secure and easy to remember. 6. Create a flatev password. You can change your password at any time. 7. Enter your Password Reset Question and Answer. This can be used at a later time if you forget your password. 8. Enter your e-mail address. You will receive e-mail notification when new information is available in [address] 9. Click Sign Up. You can now view and download portions of your medical record. 10. Click the Download Summary menu link to download a portable copy of your medical information. If you have questions, please visit the Frequently Asked Questions section of the flatev website. Remember, flatev is NOT to be used for urgent needs. For medical emergencies, dial 911. Now available from your iPhone and Android! Please provide this summary of care documentation to your next provider. Your primary care clinician is listed as Jose Camp. If you have any questions after today's visit, please call [phone].

## 2017-10-12 ENCOUNTER — HOSPITAL ENCOUNTER (OUTPATIENT)
Dept: MAMMOGRAPHY | Age: 62
Discharge: HOME | End: 2017-10-12
Attending: OBSTETRICS & GYNECOLOGY
Payer: COMMERCIAL

## 2017-10-12 DIAGNOSIS — Z12.31 VISIT FOR SCREENING MAMMOGRAM: ICD-10-CM

## 2017-10-12 PROCEDURE — 77067 SCR MAMMO BI INCL CAD: CPT

## 2017-12-01 ENCOUNTER — OFFICE VISIT (OUTPATIENT)
Dept: FAMILY MEDICINE CLINIC | Age: 62
End: 2017-12-01

## 2017-12-01 VITALS
HEART RATE: 86 BPM | RESPIRATION RATE: 16 BRPM | TEMPERATURE: 97.4 F | DIASTOLIC BLOOD PRESSURE: 80 MMHG | HEIGHT: 62 IN | OXYGEN SATURATION: 98 % | WEIGHT: 209 LBS | BODY MASS INDEX: 38.46 KG/M2 | SYSTOLIC BLOOD PRESSURE: 136 MMHG

## 2017-12-01 DIAGNOSIS — Z63.6 CAREGIVER STRESS: ICD-10-CM

## 2017-12-01 DIAGNOSIS — E11.9 TYPE 2 DIABETES MELLITUS WITHOUT COMPLICATION, UNSPECIFIED LONG TERM INSULIN USE STATUS: ICD-10-CM

## 2017-12-01 DIAGNOSIS — M79.671 RIGHT FOOT PAIN: ICD-10-CM

## 2017-12-01 DIAGNOSIS — I10 ESSENTIAL HYPERTENSION: Primary | ICD-10-CM

## 2017-12-01 DIAGNOSIS — Z51.81 ENCOUNTER FOR MEDICATION MONITORING: ICD-10-CM

## 2017-12-01 DIAGNOSIS — E78.2 MIXED HYPERLIPIDEMIA: ICD-10-CM

## 2017-12-01 DIAGNOSIS — E66.9 NON MORBID OBESITY: ICD-10-CM

## 2017-12-01 LAB
GLUCOSE POC: 178 MG/DL
HBA1C MFR BLD HPLC: 7.4 %

## 2017-12-01 RX ORDER — ALPRAZOLAM 0.5 MG/1
TABLET ORAL
COMMUNITY
Start: 2017-09-09 | End: 2017-12-01 | Stop reason: SDUPTHER

## 2017-12-01 RX ORDER — IBUPROFEN 200 MG
400 TABLET ORAL
COMMUNITY
End: 2019-08-27

## 2017-12-01 RX ORDER — METFORMIN HYDROCHLORIDE 500 MG/1
500 TABLET ORAL
COMMUNITY
End: 2017-12-01

## 2017-12-01 RX ORDER — VALSARTAN AND HYDROCHLOROTHIAZIDE 320; 25 MG/1; MG/1
TABLET, FILM COATED ORAL
COMMUNITY
Start: 2017-08-30 | End: 2017-12-01 | Stop reason: SDUPTHER

## 2017-12-01 RX ORDER — PREDNISONE 10 MG/1
10 TABLET ORAL 2 TIMES DAILY
Qty: 10 TAB | Refills: 0 | Status: SHIPPED | OUTPATIENT
Start: 2017-12-01 | End: 2017-12-06

## 2017-12-01 RX ORDER — GLIPIZIDE 5 MG/1
TABLET, FILM COATED, EXTENDED RELEASE ORAL
COMMUNITY
Start: 2017-08-30 | End: 2017-12-01 | Stop reason: SDUPTHER

## 2017-12-01 RX ORDER — DICLOFENAC SODIUM 75 MG/1
75 TABLET, DELAYED RELEASE ORAL
COMMUNITY
Start: 2017-09-18 | End: 2017-12-01

## 2017-12-01 SDOH — SOCIAL STABILITY - SOCIAL INSECURITY: DEPENDENT RELATIVE NEEDING CARE AT HOME: Z63.6

## 2017-12-01 NOTE — PROGRESS NOTES
HISTORY OF PRESENT ILLNESS  Awa Fofana is a 63 y.o. female. HPI   Follow up on chronic medical problems. C/o right foot pain over the past week. No injury noted. Woke up with foot hurting. Has some slight swelling. Increase pain with walking. C/o increase stress over the past several month as being the primary caregiver for her aging mother. Has very little help from her siblings. Not sleeping well at night. Feeling overwhelmed. \"Nerves are shot\". She feels that she needs some time off from work to help her with her stress and insomnia. HTN follow up:  Compliant w/ meds, low salt diet, and exercise. No home bp monitoring. No swelling, headache or dizziness. No chest pain, SOB, palpitations. Otherwise feeling well since the last visit. DM type II follow up:  Compliant w/ meds, diabetic diet, and exercise. Obtains home glucose monitoring averaging 100-140. Checks BS daily on most days and prn. Pt does not have BS log at visit today. No Rf needed for today. Denies any tingling sensation, polyuria and polydipsia. No blurred vision. No significant weight changes. Feeling well since last OV. Hypercholesterolemia follow up:  Compliant w/ low fat, low cholesterol diet. Exercising some. Off of Lipitor d/t muscle aching.       Patient Active Problem List   Diagnosis Code    DJD (degenerative joint disease) back M19.90    Hypokalemia E87.6    History of hysterectomy, supracervical Z90.711    Mixed hyperlipidemia E78.2    Rotator cuff tear M75.100    Unspecified vitamin D deficiency E55.9    Essential hypertension I10    Type 2 diabetes mellitus without complication ([facility] [address] U70.3    Other osteoarthritis of spine, lumbar region M47.896    Encounter for medication monitoring Z51.81    Rotator cuff arthropathy M12.819    S/P rotator cuff repair Z98.890       Current Outpatient Prescriptions   Medication Sig Dispense Refill    ibuprofen (MOTRIN IB) 200 mg tablet Take 400 mg by mouth every six (6) hours as needed for Pain.  VITAMIN B COMPLEX PO Take 1 Tab by mouth daily.  terbinafine HCl (LAMISIL) 250 mg tablet Take 1 Tab by mouth daily. Indications: TOENAIL ONYCHOMYCOSIS 30 Tab 1    ALPRAZolam (XANAX) 0.5 mg tablet TAKE 1 TABLET BY MOUTH ONCE DAILY EVERY NIGHT AT BEDTIME AS NEEDED FOR SLEEP 30 Tab 3    glipiZIDE SR (GLUCOTROL XL) 5 mg CR tablet TAKE 1 TABLET BY MOUTH ONCE DAILY 30 Tab 11    glucose blood VI test strips (ACCU-CHEK SMARTVIEW TEST STRIP) strip Use to check blood sugar daily and prn. 100 Strip 3    Lancets (ACCU-CHEK FASTCLIX) misc Use to obtain blood sample for glucose testing daily and prn. 100 Each 3    metFORMIN ER (GLUCOPHAGE XR) 500 mg tablet Take 2 Tabs by mouth two (2) times daily (with meals). 360 Tab 3    valsartan-hydrochlorothiazide (DIOVAN-HCT) 320-25 mg per tablet Take 1 Tab by mouth daily. 30 Tab 11    aspirin 81 mg tablet Take 81 mg by mouth daily.          Allergies   Allergen Reactions    Codeine Nausea and Vomiting    Keflex [Cephalexin] Nausea and Vomiting    Lipitor [Atorvastatin] Myalgia    Oxycodone Nausea and Vomiting    Percocet [Oxycodone-Acetaminophen] Nausea and Vomiting    Tramadol Other (comments) and Nausea and Vomiting     dizziness    Vicodin [Hydrocodone-Acetaminophen] Nausea and Vomiting         Past Medical History:   Diagnosis Date    DJD (degenerative joint disease) back 4/10/2010    and neck    DM (diabetes mellitus) ([facility] [address] 4/10/2010    Elevated cholesterol 4/10/2010    HTN (hypertension) 4/10/2010    Hypokalemia 4/10/2010    Unspecified vitamin D deficiency          Past Surgical History:   Procedure Laterality Date    HX HYSTERECTOMY  6/14/04    [address]    HX MOHS PROCEDURES  11/10/2015    right shoulder    HX ORTHOPAEDIC  4/29/13    LEFT ROTATOR CUFF REPAIR      HX ORTHOPAEDIC  06/20/13    shoulder manipulation for frozen shoulder    HX ORTHOPAEDIC  8/5/2014    pt states she had injection by ortho in right groin    HX ORTHOPAEDIC  2/16/2016    right shoulder manipulation    HX ORTHOPAEDIC  11/10/2015    right rotator cuff    HX TONSILLECTOMY      age 33    NM COLONOSCOPY FLX DX W/COLLJ SPEC WHEN PFRMD  03/28/2008    Dr Jensen Marking         Family History   Problem Relation Age of Onset    Hypertension Mother     Diabetes Mother     Kidney Disease Mother     Heart Attack Father     Bleeding Prob Father      taking coumadin    Bipolar Disorder Sister     Hypertension Sister     No Known Problems Sister        Social History   Substance Use Topics    Smoking status: Never Smoker    Smokeless tobacco: Never Used    Alcohol use No        Lab Results   Component Value Date/Time    WBC 7.6 08/02/2017 09:55 AM    HGB 12.9 08/02/2017 09:55 AM    HCT 38.6 08/02/2017 09:55 AM    PLATELET 905 66/10/0570 09:55 AM    MCV 80 08/02/2017 09:55 AM       Lab Results   Component Value Date/Time    Cholesterol, total 203 08/02/2017 09:55 AM    HDL Cholesterol 55 08/02/2017 09:55 AM    LDL, calculated 129 08/02/2017 09:55 AM    Triglyceride 95 08/02/2017 09:55 AM    CHOL/HDL Ratio 3.9 05/04/2010 04:50 PM       Lab Results   Component Value Date/Time    TSH 1.020 08/02/2017 09:55 AM      Lab Results   Component Value Date/Time    Sodium 143 08/02/2017 09:55 AM    Potassium 3.7 08/02/2017 09:55 AM    Chloride 99 08/02/2017 09:55 AM    CO2 29 08/02/2017 09:55 AM    Anion gap 11 06/12/2014 09:23 PM    Glucose 96 08/02/2017 09:55 AM    BUN 17 08/02/2017 09:55 AM    Creatinine 0.78 08/02/2017 09:55 AM    BUN/Creatinine ratio 22 08/02/2017 09:55 AM    GFR est AA 96 08/02/2017 09:55 AM    GFR est non-AA 83 08/02/2017 09:55 AM    Calcium 9.9 08/02/2017 09:55 AM    Bilirubin, total 0.4 09/01/2017 09:10 AM    ALT (SGPT) 13 09/01/2017 09:10 AM    AST (SGOT) 16 09/01/2017 09:10 AM    Alk.  phosphatase 111 09/01/2017 09:10 AM    Protein, total 6.7 09/01/2017 09:10 AM    Albumin 4.1 09/01/2017 09:10 AM    Globulin 3.9 06/12/2014 09:23 PM A-G Ratio 1.7 08/02/2017 09:55 AM      Lab Results   Component Value Date/Time    Hemoglobin A1c 6.9 02/20/2015 09:57 AM    Hemoglobin A1c (POC) 6.3 08/02/2017 09:55 AM         Review of Systems   Constitutional: Negative for malaise/fatigue. HENT: Negative for congestion. Eyes: Negative for blurred vision. Respiratory: Negative for cough and shortness of breath. Cardiovascular: Negative for chest pain, palpitations and leg swelling. Gastrointestinal: Negative for heartburn, abdominal pain and constipation. Genitourinary: Negative for dysuria, urgency and frequency. Musculoskeletal: Positive for joint pain. Negative for back pain. Neurological: Negative for dizziness, tingling and headaches. Endo/Heme/Allergies: Negative for environmental allergies. Psychiatric/Behavioral: Negative for depression. The patient does not have insomnia. Physical Exam   Constitutional: She appears well-developed and well-nourished. /80  Pulse 86  Temp 97.4 °F (36.3 °C) (Oral)   Resp 16  Ht 5' 2\" (1.575 m)  Wt 209 lb (94.8 kg)  LMP 06/14/2004  SpO2 98%  BMI 38.23 kg/m2     HENT:   Right Ear: Tympanic membrane and ear canal normal.   Left Ear: Tympanic membrane and ear canal normal.   Nose: No mucosal edema or rhinorrhea. Mouth/Throat: Oropharynx is clear and moist and mucous membranes are normal.   Neck: Normal range of motion. Neck supple. No thyromegaly present. Cardiovascular: Normal rate and regular rhythm. No murmur heard. Pulmonary/Chest: Effort normal and breath sounds normal.   Abdominal: Soft. Bowel sounds are normal. There is no tenderness. Musculoskeletal: Normal range of motion. She exhibits no edema. Lymphadenopathy:     She has no cervical adenopathy. Skin: Skin is warm and dry. Psychiatric: She has a normal mood and affect. Nursing note and vitals reviewed. ASSESSMENT and PLAN  Diagnoses and all orders for this visit:    1.  Essential hypertension  Discussed sodium restriction, high k rich diet, maintaining ideal body weight and regular exercise program such as daily walking 30 min perday 4-5 times per week, as physiologic means to achieve blood pressure control.  Medication compliance advised. 2. Type 2 diabetes mellitus without complication, unspecified long term insulin use status (HCC)  -     AMB POC HEMOGLOBIN A1C  -     AMB POC GLUCOSE, QUANTITATIVE, BLOOD    3. Mixed hyperlipidemia  -     LIPID PANEL    4. Right foot pain  Likely Gout  -     XR FOOT RT MIN 3 V; Future  -     URIC ACID  -     predniSONE (DELTASONE) 10 mg tablet; Take 1 Tab by mouth two (2) times a day for 5 days. Info given and reviewed diet. 5. Encounter for medication monitoring  -     METABOLIC PANEL, COMPREHENSIVE    6. Non morbid obesity  Discussed weight loss options, diet and exercise. Also reviewed health issues related to obesity. Initial goal of weight loss 10% of current weight. Counseled on goal for exercise of eventual goal of 30-60 minutes 5-7 times a week as per AHA guidelines. Decrease carbohydrates (white foods, sweet foods, sweet drinks and alcohol), increase green leafy vegetables and protein (lean meats and beans). Avoid fried foods. Increase water intake. Eat 3-5 small meals daily. Increase physical activity. 7.  Caregiver Stress  Note given to take next 1 week off. Advise pt to join a support group and solicit the help from other family members     Follow-up Disposition:  Return in about 3 months (around 3/1/2018).   reviewed diet, exercise and weight control  cardiovascular risk and specific lipid/LDL goals reviewed  reviewed medications and side effects in detail  specific diabetic recommendations: low cholesterol diet, weight control and daily exercise discussed, home glucose monitoring emphasized, foot care discussed and Podiatry visits discussed, annual eye examinations at Ophthalmology discussed and glycohemoglobin and other lab monitoring discussed      I have discussed diagnosis listed in this note with pt and/or family. I have discussed treatment plans and options and the risk/benefit analysis of those options, including safe use of medications and possible medication side effects. Through the use of shared decision making we have agreed to the above plan. The patient has received an after-visit summary and questions were answered concerning future plans and follow up. Advise pt of any urgent changes then to proceed to the ER.

## 2017-12-01 NOTE — PROGRESS NOTES
Chief Complaint   Patient presents with    Hypertension     follow up    Diabetes     follow up    Stress     pt c/o stress - pt states she is taking care of her mother    Foot Pain     pt c/o right foot pain x 1 week       Pt states she has been taking Xanax which doesn't seem to be helping. Pt states she has been taking Motrin for right foot pain with no relief. Hgb A1C: 8/2/2017    Lipid: 8/2/2017    CMP 8/2/2017    Microalbumin: 3/13/2017    Eye exam:8/7/2017 by Dr. Robson Her.      Colonoscopy: 3/28/2008 by Dr. Jeremiah Hogan repeat in 10 yrs    Mammogram: 10/12/2017

## 2017-12-01 NOTE — LETTER
NOTIFICATION RETURN TO WORK / SCHOOL 
 
12/1/2017 8:49 AM 
 
Ms. Mina He [address] [place] [address] Dr. [phone] To Whom It May Concern: 
 
Mina He is currently under the care of [facility]. She will return to work/school on: December 7, 2017 If there are questions or concerns please have the patient contact our office. Sincerely, Remi Torres MD

## 2017-12-01 NOTE — MR AVS SNAPSHOT
Visit Information Date & Time Provider Department Dept. Phone Encounter #  
 12/1/2017  7:30 AM Eloina ChWm [phone] [id number] Follow-up Instructions Return in about 3 months (around 3/1/2018). Your Appointments 9/14/2018 11:00 AM  
ROUTINE CARE with Brennon Fields MD  
[facility] [address]) Appt Note: Well Woman $0CP SL 9.8.17  
 [address] [phone] [address] [place] [address] [address] [address] Upcoming Health Maintenance Date Due ZOSTER VACCINE AGE 60> 8/1/2017 COLONOSCOPY 3/28/2018 HEMOGLOBIN A1C Q6M 2/2/2018 MICROALBUMIN Q1 3/13/2018 FOOT EXAM Q1 8/2/2018 LIPID PANEL Q1 8/2/2018 EYE EXAM RETINAL OR DILATED Q1 8/7/2018 BREAST CANCER SCRN MAMMOGRAM 10/12/2019 PAP AKA CERVICAL CYTOLOGY 9/8/2020 DTaP/Tdap/Td series (2 - Td) 5/1/2022 Allergies as of 12/1/2017  Review Complete On: 12/1/2017 By: Eloina Ch MD  
  
 Severity Noted Reaction Type Reactions Codeine  04/10/2010    Nausea and Vomiting Keflex [Cephalexin]  04/10/2010    Nausea and Vomiting Lipitor [Atorvastatin]  02/20/2015    Myalgia Oxycodone  09/18/2017    Nausea and Vomiting Percocet [Oxycodone-acetaminophen]  04/10/2010    Nausea and Vomiting Tramadol  04/22/2013    Other (comments), Nausea and Vomiting  
 dizziness Vicodin [Hydrocodone-acetaminophen]  04/22/2013    Nausea and Vomiting Current Immunizations  Reviewed on 12/1/2017 Name Date Influenza Vaccine 10/11/2016, 10/12/2015 Pneumococcal Polysaccharide (PPSV-23)  Deferred (Patient Refused) Tdap 5/1/2012 Reviewed by Eloina Ch MD on 12/1/2017 at  8:03 AM  
 Reviewed by Eloina Ch MD on 12/1/2017 at  8:15 AM  
You Were Diagnosed With   
  
 Codes Comments Essential hypertension    -  Primary ICD-10-CM: I10 
ICD-9-CM: 401.9 Type 2 diabetes mellitus without complication, unspecified long term insulin use status (HCC)     ICD-10-CM: E11.9 ICD-9-CM: 250.00 Mixed hyperlipidemia     ICD-10-CM: E78.2 ICD-9-CM: 272.2 Right foot pain     ICD-10-CM: M79.671 ICD-9-CM: 729.5 Encounter for medication monitoring     ICD-10-CM: Z51.81 
ICD-9-CM: V58.83 Non morbid obesity     ICD-10-CM: E66.9 ICD-9-CM: 278.00 Vitals BP Pulse Temp Resp Height(growth percentile) Weight(growth percentile) 136/80 86 97.4 °F (36.3 °C) (Oral) 16 5' 2\" (1.575 m) 209 lb (94.8 kg) LMP SpO2 BMI OB Status Smoking Status 06/14/2004 98% 38.23 kg/m2 Hysterectomy Never Smoker Vitals History BMI and BSA Data Body Mass Index Body Surface Area  
 38.23 kg/m 2 2.04 m 2 [facility] [phone] Your Updated Medication List  
  
   
This list is accurate as of: 12/1/17  8:52 AM.  Always use your most recent med list.  
  
  
  
  
 ALPRAZolam 0.5 mg tablet Commonly known as:  XANAX  
TAKE 1 TABLET BY MOUTH ONCE DAILY EVERY NIGHT AT BEDTIME AS NEEDED FOR SLEEP  
  
 aspirin 81 mg tablet Take 81 mg by mouth daily. glipiZIDE SR 5 mg CR tablet Commonly known as:  GLUCOTROL XL  
TAKE 1 TABLET BY MOUTH ONCE DAILY  
  
 glucose blood VI test strips strip Commonly known as:  [address] Use to check blood sugar daily and prn. Lancets Misc Commonly known as:  ACCU-CHEK FASTCLIX Use to obtain blood sample for glucose testing daily and prn.  
  
 metFORMIN  mg tablet Commonly known as:  GLUCOPHAGE XR Take 2 Tabs by mouth two (2) times daily (with meals). MOTRIN  mg tablet Generic drug:  ibuprofen Take 400 mg by mouth every six (6) hours as needed for Pain. predniSONE 10 mg tablet Commonly known as:  Rickie Dally Take 1 Tab by mouth two (2) times a day for 5 days. terbinafine HCl 250 mg tablet Commonly known as:  LAMISIL Take 1 Tab by mouth daily. Indications: TOENAIL ONYCHOMYCOSIS  
  
 valsartan-hydroCHLOROthiazide 320-25 mg per tablet Commonly known as:  DIOVAN-HCT Take 1 Tab by mouth daily. VITAMIN B COMPLEX PO Take 1 Tab by mouth daily. Prescriptions Sent to Pharmacy Refills  
 predniSONE (DELTASONE) 10 mg tablet 0 Sig: Take 1 Tab by mouth two (2) times a day for 5 days. Class: Normal  
 Pharmacy: [facility] [address] [place] [address] RD AT [address] [facility] #: [phone] Route: Oral  
  
We Performed the Following AMB POC GLUCOSE, QUANTITATIVE, BLOOD [80703 CPT(R)] AMB POC HEMOGLOBIN A1C [80990 CPT(R)] LIPID PANEL [65159 CPT(R)] METABOLIC PANEL, COMPREHENSIVE [48569 CPT(R)] URIC ACID U3043316 CPT(R)] Follow-up Instructions Return in about 3 months (around 3/1/2018). To-Do List   
 12/01/2017 Imaging:  XR FOOT RT MIN 3 V Introducing [facility]! Romayne Duster introduces [employer] patient portal. Now you can access parts of your medical record, email your doctor's office, and request medication refills online. 1. In your internet browser, go to [email or web address] 2. Click on the First Time User? Click Here link in the Sign In box. You will see the New Member Sign Up page. 3. Enter your [employer] Access Code exactly as it appears below. You will not need to use this code after youve completed the sign-up process. If you do not sign up before the expiration date, you must request a new code. · [employer] Access Code: [email or web address] Expires: 3/1/2018  8:49 AM 
 
4.  Enter the last four digits of your Social Security Number (xxxx) and Date of Birth (mm/dd/yyyy) as indicated and click Submit. You will be taken to the next sign-up page. 5. Create a [email or web address] ID. This will be your [email or web address] login ID and cannot be changed, so think of one that is secure and easy to remember. 6. Create a [email or web address] password. You can change your password at any time. 7. Enter your Password Reset Question and Answer. This can be used at a later time if you forget your password. 8. Enter your e-mail address. You will receive e-mail notification when new information is available in [address] 9. Click Sign Up. You can now view and download portions of your medical record. 10. Click the Download Summary menu link to download a portable copy of your medical information. If you have questions, please visit the Frequently Asked Questions section of the [email or web address] website. Remember, [email or web address] is NOT to be used for urgent needs. For medical emergencies, dial 911. Now available from your iPhone and Android! Please provide this summary of care documentation to your next provider. Your primary care clinician is listed as Chris Campbell. If you have any questions after today's visit, please call [phone].

## 2017-12-02 LAB
ALBUMIN SERPL-MCNC: 4.5 G/DL (ref 3.6–4.8)
ALBUMIN/GLOB SERPL: 2 {RATIO} (ref 1.2–2.2)
ALP SERPL-CCNC: 112 IU/L (ref 39–117)
ALT SERPL-CCNC: 15 IU/L (ref 0–32)
AST SERPL-CCNC: 19 IU/L (ref 0–40)
BILIRUB SERPL-MCNC: 0.5 MG/DL (ref 0–1.2)
BUN SERPL-MCNC: 18 MG/DL (ref 8–27)
BUN/CREAT SERPL: 27 (ref 12–28)
CALCIUM SERPL-MCNC: 9.3 MG/DL (ref 8.7–10.3)
CHLORIDE SERPL-SCNC: 101 MMOL/L (ref 96–106)
CHOLEST SERPL-MCNC: 195 MG/DL (ref 100–199)
CO2 SERPL-SCNC: 24 MMOL/L (ref 18–29)
CREAT SERPL-MCNC: 0.67 MG/DL (ref 0.57–1)
GFR SERPLBLD CREATININE-BSD FMLA CKD-EPI: 110 ML/MIN/1.73
GFR SERPLBLD CREATININE-BSD FMLA CKD-EPI: 96 ML/MIN/1.73
GLOBULIN SER CALC-MCNC: 2.3 G/DL (ref 1.5–4.5)
GLUCOSE SERPL-MCNC: 176 MG/DL (ref 65–99)
HDLC SERPL-MCNC: 51 MG/DL
INTERPRETATION, 910389: NORMAL
LDLC SERPL CALC-MCNC: 130 MG/DL (ref 0–99)
POTASSIUM SERPL-SCNC: 3.9 MMOL/L (ref 3.5–5.2)
PROT SERPL-MCNC: 6.8 G/DL (ref 6–8.5)
SODIUM SERPL-SCNC: 142 MMOL/L (ref 134–144)
TRIGL SERPL-MCNC: 70 MG/DL (ref 0–149)
URATE SERPL-MCNC: 6.3 MG/DL (ref 2.5–7.1)
VLDLC SERPL CALC-MCNC: 14 MG/DL (ref 5–40)

## 2017-12-04 DIAGNOSIS — M79.671 RIGHT FOOT PAIN: Primary | ICD-10-CM

## 2017-12-05 ENCOUNTER — HOSPITAL ENCOUNTER (EMERGENCY)
Age: 62
Discharge: HOME | End: 2017-12-05
Attending: EMERGENCY MEDICINE
Payer: COMMERCIAL

## 2017-12-05 VITALS
OXYGEN SATURATION: 96 % | BODY MASS INDEX: 38.28 KG/M2 | RESPIRATION RATE: 18 BRPM | HEART RATE: 67 BPM | WEIGHT: 208 LBS | TEMPERATURE: 97.7 F | HEIGHT: 62 IN

## 2017-12-05 DIAGNOSIS — M72.2 PLANTAR FASCIITIS: Primary | ICD-10-CM

## 2017-12-05 PROCEDURE — 99282 EMERGENCY DEPT VISIT SF MDM: CPT

## 2017-12-05 PROCEDURE — 74011250636 HC RX REV CODE- 250/636: Performed by: EMERGENCY MEDICINE

## 2017-12-05 PROCEDURE — 77030036687 HC SHOE PSTOP S2SG -A

## 2017-12-05 PROCEDURE — 96372 THER/PROPH/DIAG INJ SC/IM: CPT

## 2017-12-05 RX ORDER — KETOROLAC TROMETHAMINE 30 MG/ML
30 INJECTION, SOLUTION INTRAMUSCULAR; INTRAVENOUS
Status: COMPLETED | OUTPATIENT
Start: 2017-12-05 | End: 2017-12-05

## 2017-12-05 RX ORDER — INDOMETHACIN 25 MG/1
25 CAPSULE ORAL 3 TIMES DAILY
Qty: 30 CAP | Refills: 0 | Status: SHIPPED | OUTPATIENT
Start: 2017-12-05 | End: 2017-12-15

## 2017-12-05 RX ADMIN — KETOROLAC TROMETHAMINE 30 MG: 30 INJECTION, SOLUTION INTRAMUSCULAR at 16:07

## 2017-12-05 NOTE — ED PROVIDER NOTES
EMERGENCY DEPARTMENT HISTORY AND PHYSICAL EXAM      Date: 12/5/2017  Patient Name: Ricarda Fernández    History of Presenting Illness     Chief Complaint   Patient presents with    Foot Pain     right foot pain x 2 wks, denies injury, seen by PCP on friday and dx gout, pt continues with pain       History Provided By: Patient    HPI: Ricarda Fernández, 63 y.o. female with PMHx significant for HTN, DJD, DM, presents via wheelchair to the ED with cc of gradually worsening diffuse R foot pain with associated swelling to the dorsal aspect. x 1 week. Pt's pain is exacerbated by weight bearing. Pt does stand and walk for the majority of the day as she works in at [employer]. She reports that she was seen by her PCP on 12/1/2017 for her pain and received a five day course of prednisone and R foot XR. Her XR showed plantar fascitis of her R foot, which pt has been made aware of. She endorses compliance with her prednisone, noting that she took her last dose today. She also endorses following her PCP's directions of alternating heat and ice on the affected area, however, pt has had no relief of her pain. She specifically denies recent trauma or injury. Of note, pt declines narcotic pain medication due to adverse reactions in the past.         PCP: Eloina Ch MD    There are no other complaints, changes, or physical findings at this time. Current Outpatient Prescriptions   Medication Sig Dispense Refill    indomethacin (INDOCIN) 25 mg capsule Take 1 Cap by mouth three (3) times daily for 10 days. With food 30 Cap 0    ibuprofen (MOTRIN IB) 200 mg tablet Take 400 mg by mouth every six (6) hours as needed for Pain.  predniSONE (DELTASONE) 10 mg tablet Take 1 Tab by mouth two (2) times a day for 5 days. 10 Tab 0    VITAMIN B COMPLEX PO Take 1 Tab by mouth daily.  terbinafine HCl (LAMISIL) 250 mg tablet Take 1 Tab by mouth daily.  Indications: TOENAIL ONYCHOMYCOSIS 30 Tab 1    ALPRAZolam (XANAX) 0.5 mg tablet TAKE 1 TABLET BY MOUTH ONCE DAILY EVERY NIGHT AT BEDTIME AS NEEDED FOR SLEEP 30 Tab 3    glipiZIDE SR (GLUCOTROL XL) 5 mg CR tablet TAKE 1 TABLET BY MOUTH ONCE DAILY 30 Tab 11    glucose blood VI test strips (ACCU-CHEK SMARTVIEW TEST STRIP) strip Use to check blood sugar daily and prn. 100 Strip 3    Lancets (ACCU-CHEK FASTCLIX) misc Use to obtain blood sample for glucose testing daily and prn. 100 Each 3    metFORMIN ER (GLUCOPHAGE XR) 500 mg tablet Take 2 Tabs by mouth two (2) times daily (with meals). 360 Tab 3    valsartan-hydrochlorothiazide (DIOVAN-HCT) 320-25 mg per tablet Take 1 Tab by mouth daily. 30 Tab 11    aspirin 81 mg tablet Take 81 mg by mouth daily.          Past History     Past Medical History:  Past Medical History:   Diagnosis Date    DJD (degenerative joint disease) back 4/10/2010    and neck    DM (diabetes mellitus) ([facility] [address] 4/10/2010    Elevated cholesterol 4/10/2010    HTN (hypertension) 4/10/2010    Hypokalemia 4/10/2010    Unspecified vitamin D deficiency        Past Surgical History:  Past Surgical History:   Procedure Laterality Date    HX HYSTERECTOMY  6/14/04    [address]    HX MOHS PROCEDURES  11/10/2015    right shoulder    HX ORTHOPAEDIC  4/29/13    LEFT ROTATOR CUFF REPAIR      HX ORTHOPAEDIC  06/20/13    shoulder manipulation for frozen shoulder    HX ORTHOPAEDIC  8/5/2014    pt states she had injection by ortho in right groin    HX ORTHOPAEDIC  2/16/2016    right shoulder manipulation    HX ORTHOPAEDIC  11/10/2015    right rotator cuff    HX TONSILLECTOMY      age 31    MO COLONOSCOPY FLX DX W/COLLJ SPEC WHEN PFRMD  03/28/2008    Dr Patricia Jiang       Family History:  Family History   Problem Relation Age of Onset    Hypertension Mother     Diabetes Mother     Kidney Disease Mother     Heart Attack Father     Bleeding Prob Father      taking coumadin    Bipolar Disorder Sister     Hypertension Sister     No Known Problems Sister        Social History:  Social History Substance Use Topics    Smoking status: Never Smoker    Smokeless tobacco: Never Used    Alcohol use No       Allergies: Allergies   Allergen Reactions    Codeine Nausea and Vomiting    Keflex [Cephalexin] Nausea and Vomiting    Lipitor [Atorvastatin] Myalgia    Oxycodone Nausea and Vomiting    Percocet [Oxycodone-Acetaminophen] Nausea and Vomiting    Tramadol Other (comments) and Nausea and Vomiting     dizziness    Vicodin [Hydrocodone-Acetaminophen] Nausea and Vomiting         Review of Systems   Review of Systems   Constitutional: Negative for activity change, chills and fever. HENT: Negative for congestion and sore throat. Eyes: Negative for pain and redness. Respiratory: Negative for cough, chest tightness and shortness of breath. Cardiovascular: Negative for chest pain and palpitations. Gastrointestinal: Negative for abdominal pain, diarrhea, nausea and vomiting. Genitourinary: Negative for dysuria, frequency and urgency. Musculoskeletal: Positive for myalgias (diffuse R foot pain with swelling to the dorsal aspect). Negative for back pain and neck pain. Skin: Negative for rash. Neurological: Negative for syncope, light-headedness and headaches. Psychiatric/Behavioral: Negative for confusion. All other systems reviewed and are negative. Physical Exam   Physical Exam   Constitutional: She is oriented to person, place, and time. She appears well-developed and well-nourished. No distress. HENT:   Head: Normocephalic. Nose: Nose normal.   Mouth/Throat: Oropharynx is clear and moist. No oropharyngeal exudate. Eyes: Conjunctivae are normal. Pupils are equal, round, and reactive to light. No scleral icterus. Neck: Normal range of motion. Neck supple. No JVD present. No tracheal deviation present. No thyromegaly present. Cardiovascular: Normal rate, regular rhythm and intact distal pulses. Exam reveals no gallop and no friction rub. No murmur heard.   Pulses: Dorsalis pedis pulses are 2+ on the right side        Posterior tibial pulses are 2+ on the right side   Pulmonary/Chest: Effort normal and breath sounds normal. No stridor. No respiratory distress. She has no wheezes. She has no rales. Abdominal: Soft. Bowel sounds are normal. She exhibits no distension. There is no tenderness. There is no rebound and no guarding. Musculoskeletal:   Moderate tenderness to R plantar fascia, specifically distal aspect  Mild swelling to R mid foot  No R achilles tendon  No R ankle tenderness    Lymphadenopathy:     She has no cervical adenopathy. Neurological: She is alert and oriented to person, place, and time. No cranial nerve deficit. She exhibits normal muscle tone. Coordination normal.   Skin: Skin is warm and dry. No rash noted. She is not diaphoretic. No erythema. Psychiatric: She has a normal mood and affect. Her behavior is normal.   Nursing note and vitals reviewed. Medical Decision Making   I am the first provider for this patient. I reviewed the vital signs, available nursing notes, past medical history, past surgical history, family history and social history. Vital Signs-Reviewed the patient's vital signs. Patient Vitals for the past 12 hrs:   Temp Pulse Resp SpO2   12/05/17 1507 97.7 °F (36.5 °C) 67 18 96 %       Pulse Oximetry Analysis - 96% on RA    Cardiac Monitor:   Rate: 67 bpm  Rhythm: Normal Sinus Rhythm      Records Reviewed: Nursing Notes and Old Medical Records    Provider Notes (Medical Decision Making):   DDx: plantar fascitis, gout     3:42 PM  I have reviewed medical records in the EHR, pertinent to patients present condition/presenting problems.  Pt was seen by her pcp, Dr. [place] Amaro on 12/1 for same complaints; foot pain, mild swelling and pain with ambulation for a week; pcp suspected gout; started prednisone 20mg bid x 5 days; plain films with radiologist impression \"Three views of the right foot demonstrate no fracture or other acute osseous or articular abnormality. There is a large plantar spur with blurring of  the posterior plantar fascia. There is a small Achilles spur. Mineralization is  normal.  Impression: Plantar fascitis. \"  Uric acid was also sent and was normal at 6. Ana María Last MD      ED Course:   Initial assessment performed. The patients presenting problems have been discussed, and they are in agreement with the care plan formulated and outlined with them. I have encouraged them to ask questions as they arise throughout their visit.    4:22 PM  Pain much improved after Toradol injection. Will d/c home. Written by Jasmin Linares ED Scribe as dictated by Ana María Last MD    Disposition:  DISCHARGE NOTE:  4:19 PM  Pt has been reexamined. Pt has no new complaints, changes, or physical findings. Care plan outlined and precautions discussed. All available results reviewed with pt. All medications reviewed with pt. All of pts questions and concerns addressed. Pt agrees to f/u as instructed and agrees to return to ED upon further deterioration. Pt is ready to go home. Written by Jasmin Linares ED Scribe as dictated by Ana María Last MD    PLAN:  1. Current Discharge Medication List      START taking these medications    Details   indomethacin (INDOCIN) 25 mg capsule Take 1 Cap by mouth three (3) times daily for 10 days. With food  Qty: 30 Cap, Refills: 0           2. Follow-up Information     Follow up With Details Comments Contact Info    Your orthopedic doctor as scheduled       Ivanna Branch DPM Schedule an appointment as soon as possible for a visit in 1 week  [address] [place] [address]  [address] [address] [phone]          Return to ED if worse     Diagnosis     Clinical Impression:   1. Plantar fasciitis        Attestations:     This note is prepared by Jasmin Linares acting as scribe for MD Ana María Haywood MD : The scribe's documentation has been prepared under my direction and personally reviewed by me in its entirety. I confirm that the note above accurately reflects all work, treatment, procedures, and medical decision making performed by me.

## 2017-12-05 NOTE — ED NOTES
Patient ambulated to exam room. Says she was recently dx with gout but pain persists.  Recently bought a cane to assist with ambulation

## 2017-12-05 NOTE — DISCHARGE INSTRUCTIONS
Plantar Fasciitis: Care Instructions  Your Care Instructions    Plantar fasciitis is pain and inflammation of the plantar fascia, the tissue at the bottom of your foot that connects the heel bone to the toes. The plantar fascia also supports the arch. If you strain the plantar fascia, it can develop small tears and cause heel pain when you stand or walk. Plantar fasciitis can be caused by running or other sports. It also may occur in people who are overweight or who have high arches or flat feet. You may get plantar fasciitis if you walk or stand for long periods, or have a tight Achilles tendon or calf muscles. You can improve your foot pain with rest and other care at home. It might take a few weeks to a few months for your foot to heal completely. Follow-up care is a key part of your treatment and safety. Be sure to make and go to all appointments, and call your doctor if you are having problems. It's also a good idea to know your test results and keep a list of the medicines you take. How can you care for yourself at home? · Rest your feet often. Reduce your activity to a level that lets you avoid pain. If possible, do not run or walk on hard surfaces. · Take pain medicines exactly as directed. ¨ If the doctor gave you a prescription medicine for pain, take it as prescribed. ¨ If you are not taking a prescription pain medicine, take an over-the-counter anti-inflammatory medicine for pain and swelling, such as ibuprofen (Advil, Motrin) or naproxen (Aleve). Read and follow all instructions on the label. · Use ice massage to help with pain and swelling. You can use an ice cube or an ice cup several times a day. To make an ice cup, fill a paper cup with water and freeze it. Cut off the top of the cup until a half-inch of ice shows. Hold onto the remaining paper to use the cup. Rub the ice in small circles over the area for 5 to 7 minutes.   · Contrast baths, which alternate hot and cold water, can also help reduce swelling. But because heat alone may make pain and swelling worse, end a contrast bath with a soak in cold water. · Wear a night splint if your doctor suggests it. A night splint holds your foot with the toes pointed up and the foot and ankle at a 90-degree angle. This position gives the bottom of your foot a constant, gentle stretch. · Do simple exercises such as calf stretches and towel stretches 2 to 3 times each day, especially when you first get up in the morning. These can help the plantar fascia become more flexible. They also make the muscles that support your arch stronger. Hold these stretches for 15 to 30 seconds per stretch. Repeat 2 to 4 times. ¨ Stand about 1 foot from a wall. Place the palms of both hands against the wall at chest level. Lean forward against the wall, keeping one leg with the knee straight and heel on the ground while bending the knee of the other leg. ¨ Sit down on the floor or a mat with your feet stretched in front of you. Roll up a towel lengthwise, and loop it over the ball of your foot. Holding the towel at both ends, gently pull the towel toward you to stretch your foot. · Wear shoes with good arch support. Athletic shoes or shoes with a well-cushioned sole are good choices. · Try heel cups or shoe inserts (orthotics) to help cushion your heel. You can buy these at many shoe stores. · Put on your shoes as soon as you get out of bed. Going barefoot or wearing slippers may make your pain worse. · Reach and stay at a good weight for your height. This puts less strain on your feet. When should you call for help? Call your doctor now or seek immediate medical care if:  · You have heel pain with fever, redness, or warmth in your heel. · You cannot put weight on the sore foot. Watch closely for changes in your health, and be sure to contact your doctor if:  · You have numbness or tingling in your heel. · Your heel pain lasts more than 2 weeks.   Where can you learn more? Go to http://deny-cuate.info/. Jean Pierre Carrillo in the search box to learn more about \"Plantar Fasciitis: Care Instructions. \"  Current as of: March 21, 2017  Content Version: 11.4  © 8472-0807 [employer]. Care instructions adapted under license by [employer] (which disclaims liability or warranty for this information). If you have questions about a medical condition or this instruction, always ask your healthcare professional. [place] [address] any warranty or liability for your use of this information.

## 2017-12-05 NOTE — LETTER
[address] 
[facility] EMERGENCY DEPT 
[address] [place] [address] [address] [id number] [phone] Work/School Note Date: 12/5/2017 To Whom It May concern: 
 
Eulalia Morales was seen and treated today in the emergency room by the following provider(s): 
Attending Provider: Elsy Walsh MD. Eulalia Morales may return to work on 12/11/2017. Sincerely, Elsy Walsh MD

## 2017-12-12 ENCOUNTER — TELEPHONE (OUTPATIENT)
Dept: FAMILY MEDICINE CLINIC | Age: 62
End: 2017-12-12

## 2017-12-12 NOTE — TELEPHONE ENCOUNTER
Called pt LM appt scheduled with Dr. Svitlana Shrestha, located at [address] [place] [address](at [facility]) Appt is 12/18/2017 at 3pm. If any questions please call back.

## 2018-03-15 DIAGNOSIS — F51.01 PRIMARY INSOMNIA: Primary | ICD-10-CM

## 2018-03-15 RX ORDER — ALPRAZOLAM 0.5 MG/1
TABLET ORAL
Qty: 30 TAB | Refills: 3 | OUTPATIENT
Start: 2018-03-15 | End: 2018-03-15 | Stop reason: SDUPTHER

## 2018-03-15 RX ORDER — ALPRAZOLAM 0.5 MG/1
TABLET ORAL
Qty: 30 TAB | Refills: 3 | OUTPATIENT
Start: 2018-03-15 | End: 2018-10-19 | Stop reason: SDUPTHER

## 2018-03-30 ENCOUNTER — OFFICE VISIT (OUTPATIENT)
Dept: FAMILY MEDICINE CLINIC | Age: 63
End: 2018-03-30

## 2018-03-30 VITALS
TEMPERATURE: 98.1 F | HEIGHT: 62 IN | OXYGEN SATURATION: 98 % | DIASTOLIC BLOOD PRESSURE: 63 MMHG | WEIGHT: 210.8 LBS | RESPIRATION RATE: 18 BRPM | HEART RATE: 75 BPM | SYSTOLIC BLOOD PRESSURE: 136 MMHG | BODY MASS INDEX: 38.79 KG/M2

## 2018-03-30 DIAGNOSIS — Z51.81 ENCOUNTER FOR MEDICATION MONITORING: ICD-10-CM

## 2018-03-30 DIAGNOSIS — Z23 NEED FOR SHINGLES VACCINE: ICD-10-CM

## 2018-03-30 DIAGNOSIS — E11.9 TYPE 2 DIABETES MELLITUS WITHOUT COMPLICATION, UNSPECIFIED WHETHER LONG TERM INSULIN USE (HCC): ICD-10-CM

## 2018-03-30 DIAGNOSIS — E78.2 MIXED HYPERLIPIDEMIA: ICD-10-CM

## 2018-03-30 DIAGNOSIS — I10 ESSENTIAL HYPERTENSION: Primary | ICD-10-CM

## 2018-03-30 DIAGNOSIS — M15.9 PRIMARY OSTEOARTHRITIS INVOLVING MULTIPLE JOINTS: ICD-10-CM

## 2018-03-30 PROBLEM — E66.01 SEVERE OBESITY (BMI 35.0-39.9) WITH COMORBIDITY (HCC): Status: ACTIVE | Noted: 2018-03-30

## 2018-03-30 LAB
BILIRUB UR QL STRIP: NEGATIVE
GLUCOSE POC: 89 MG/DL
GLUCOSE UR-MCNC: NEGATIVE MG/DL
HBA1C MFR BLD HPLC: 8.4 %
KETONES P FAST UR STRIP-MCNC: NEGATIVE MG/DL
PH UR STRIP: 5.5 [PH] (ref 4.6–8)
PROT UR QL STRIP: NEGATIVE
SP GR UR STRIP: 1.01 (ref 1–1.03)
UA UROBILINOGEN AMB POC: NORMAL (ref 0.2–1)
URINALYSIS CLARITY POC: CLEAR
URINALYSIS COLOR POC: YELLOW
URINE BLOOD POC: NEGATIVE
URINE LEUKOCYTES POC: NORMAL
URINE NITRITES POC: NEGATIVE

## 2018-03-30 RX ORDER — MELOXICAM 15 MG/1
TABLET ORAL
Refills: 0 | COMMUNITY
Start: 2018-03-14 | End: 2018-11-16 | Stop reason: ALTCHOICE

## 2018-03-30 RX ORDER — COLLAGENASE SANTYL 250 [ARB'U]/G
OINTMENT TOPICAL DAILY
Refills: 0 | COMMUNITY
Start: 2018-01-03 | End: 2018-06-29 | Stop reason: ALTCHOICE

## 2018-03-30 RX ORDER — NEOMYCIN SULFATE, POLYMYXIN B SULFATE, HYDROCORTISONE 3.5; 10000; 1 MG/ML; [USP'U]/ML; MG/ML
SOLUTION/ DROPS AURICULAR (OTIC)
Refills: 0 | COMMUNITY
Start: 2018-02-28 | End: 2018-06-29 | Stop reason: ALTCHOICE

## 2018-03-30 RX ORDER — GLIPIZIDE 5 MG/1
TABLET, FILM COATED, EXTENDED RELEASE ORAL
Qty: 60 TAB | Refills: 11 | Status: SHIPPED | OUTPATIENT
Start: 2018-03-30 | End: 2019-08-21 | Stop reason: SDUPTHER

## 2018-03-30 RX ORDER — SITAGLIPTIN AND METFORMIN HYDROCHLORIDE 50; 1000 MG/1; MG/1
1 TABLET, FILM COATED, EXTENDED RELEASE ORAL 2 TIMES DAILY WITH MEALS
Qty: 60 TAB | Refills: 11 | Status: SHIPPED | OUTPATIENT
Start: 2018-03-30 | End: 2019-04-11 | Stop reason: SDUPTHER

## 2018-03-30 RX ORDER — PROMETHAZINE HYDROCHLORIDE AND DEXTROMETHORPHAN HYDROBROMIDE 6.25; 15 MG/5ML; MG/5ML
SYRUP ORAL
Refills: 1 | COMMUNITY
Start: 2017-12-28 | End: 2018-03-30

## 2018-03-30 NOTE — MR AVS SNAPSHOT
[facility] 
 
 
 [address] [facility] [place] [facility] [id number] 
[phone] Patient: Rosalind Olmedo MRN: [id number] :[id number] Visit Information Date & Time Provider Department Dept. Phone Encounter #  
 3/30/2018  2:00 PM Horace GutierrezManisha [phone] [id number] Follow-up Instructions Return in about 3 months (around 2018). Your Appointments 2018 11:00 AM  
ROUTINE CARE with Grace Emery MD  
[facility]) Appt Note: Well Woman $0CP SL 9.8.17  
 [address] [facility]. [place] [address] [phone] [address] [place] [address] [address] [place] [address] Upcoming Health Maintenance Date Due ZOSTER VACCINE AGE 60> 2017 MICROALBUMIN Q1 3/13/2018 COLONOSCOPY 3/28/2018 HEMOGLOBIN A1C Q6M 2018 FOOT EXAM Q1 2018 EYE EXAM RETINAL OR DILATED Q1 2018 LIPID PANEL Q1 2018 BREAST CANCER SCRN MAMMOGRAM 10/12/2019 PAP AKA CERVICAL CYTOLOGY 2020 DTaP/Tdap/Td series (2 - Td) 2022 Allergies as of 3/30/2018  Review Complete On: 3/30/2018 By: Horace Gutierrez MD  
  
 Severity Noted Reaction Type Reactions Codeine  04/10/2010    Nausea and Vomiting Keflex [Cephalexin]  04/10/2010    Nausea and Vomiting Lipitor [Atorvastatin]  2015    Myalgia Oxycodone  2017    Nausea and Vomiting Percocet [Oxycodone-acetaminophen]  04/10/2010    Nausea and Vomiting Tramadol  2013    Other (comments), Nausea and Vomiting  
 dizziness Vicodin [Hydrocodone-acetaminophen]  2013    Nausea and Vomiting Current Immunizations  Reviewed on 3/30/2018 Name Date Influenza Nasal Vaccine (Quad) 10/31/2017 Influenza Vaccine 10/11/2016, 10/12/2015 Pneumococcal Polysaccharide (PPSV-23)  Deferred (Patient Refused) Tdap 2012 Reviewed by Celena Ricardo MD on 3/30/2018 at  2:24 PM  
You Were Diagnosed With   
  
 Codes Comments Essential hypertension    -  Primary ICD-10-CM: I10 
ICD-9-CM: 401.9 Type 2 diabetes mellitus without complication, unspecified whether long term insulin use (HCC)     ICD-10-CM: E11.9 ICD-9-CM: 250.00 Mixed hyperlipidemia     ICD-10-CM: E78.2 ICD-9-CM: 272.2 Primary osteoarthritis involving multiple joints     ICD-10-CM: M15.0 ICD-9-CM: 715.09 Encounter for medication monitoring     ICD-10-CM: Z51.81 
ICD-9-CM: V58.83 Need for shingles vaccine     ICD-10-CM: F44 ICD-9-CM: V04.89 Vitals BP Pulse Temp Resp Height(growth percentile) Weight(growth percentile) 136/63 (BP 1 Location: Left arm, BP Patient Position: At rest) 75 98.1 °F (36.7 °C) (Oral) 18 5' 2\" (1.575 m) 210 lb 12.8 oz (95.6 kg) LMP SpO2 BMI OB Status Smoking Status 06/14/2004 98% 38.56 kg/m2 Hysterectomy Never Smoker Vitals History BMI and BSA Data Body Mass Index Body Surface Area 38.56 kg/m 2 2.04 m 2 Preferred Pharmacy [place] [address] [facility] [address] [facility] [phone] Your Updated Medication List  
  
   
This list is accurate as of 3/30/18  2:55 PM.  Always use your most recent med list.  
  
  
  
  
 ALPRAZolam 0.5 mg tablet Commonly known as:  XANAX  
TAKE 1 TABLET BY MOUTH ONCE DAILY EVERY NIGHT AT BEDTIME AS NEEDED FOR SLEEP  
  
 aspirin 81 mg tablet Take 81 mg by mouth daily. glipiZIDE SR 5 mg CR tablet Commonly known as:  GLUCOTROL XL  
TAKE 2 TABLETS BY MOUTH ONCE DAILY  
  
 glucose blood VI test strips strip Commonly known as:  [address] Use to check blood sugar daily and prn. JANUMET XR 50-1,000 mg Tm24 Generic drug:  SITagliptin-metFORMIN Take 1 Tab by mouth two (2) times daily (with meals). Lancets Misc Commonly known as:  ACCU-CHEK FASTCLIX Use to obtain blood sample for glucose testing daily and prn.  
  
 meloxicam 15 mg tablet Commonly known as:  MOBIC  
TAKE ONE TABLET PO EVERY DAY AS NEEDED  
  
 metFORMIN  mg tablet Commonly known as:  GLUCOPHAGE XR Take 2 Tabs by mouth two (2) times daily (with meals). MOTRIN  mg tablet Generic drug:  ibuprofen Take 400 mg by mouth every six (6) hours as needed for Pain. neomycin-polymyxin-hydrocortisone otic solution Commonly known as:  CORTISPORIN  
APPLY 1 TO 2 DROPS ON TOE AT DRESSING CHANGES. APPLY TWICE DAY FOR THE FIRST WEEK THEN ONCE A DAY TILL HEALED  
  
 SANTYL 250 unit/gram ointment Generic drug:  collagenase Apply  to affected area daily. LEFT GREAT TOE  
  
 valsartan-hydroCHLOROthiazide 320-25 mg per tablet Commonly known as:  DIOVAN-HCT Take 1 Tab by mouth daily. varicella-zoster recombinant (PF) 50 mcg/0.5 mL Susr injection Commonly known as:  SHINGRIX  
0.5 mL by IntraMUSCular route once for 1 dose. Repeat second dose in 6 months. VITAMIN B COMPLEX PO Take 1 Tab by mouth daily. Prescriptions Printed Refills  
 varicella-zoster recombinant, PF, (SHINGRIX) 50 mcg/0.5 mL susr injection 1 Si.5 mL by IntraMUSCular route once for 1 dose. Repeat second dose in 6 months. Class: Print Route: IntraMUSCular JANUMET XR 50-1,000 mg TM24 11 Sig: Take 1 Tab by mouth two (2) times daily (with meals). Class: Print Route: Oral  
  
Prescriptions Sent to Pharmacy Refills  
 glipiZIDE SR (GLUCOTROL XL) 5 mg CR tablet 11 Sig: TAKE 2 TABLETS BY MOUTH ONCE DAILY Class: Normal  
 Pharmacy: [facility] [address] [place] [address] RD AT [address] [facility] Ph #: [phone] We Performed the Following AMB POC GLUCOSE, QUANTITATIVE, BLOOD [07494 CPT(R)] AMB POC HEMOGLOBIN A1C [71037 CPT(R)] AMB POC URINALYSIS DIP STICK AUTO W/O MICRO [06936 CPT(R)] METABOLIC PANEL, BASIC [33364 CPT(R)] MICROALBUMIN, UR, RAND W/ MICROALB/CREAT RATIO [id number] CPT(R)] Follow-up Instructions Return in about 3 months (around 6/30/2018). Introducing [facility]! Niko Padilla introduces [employer] patient portal. Now you can access parts of your medical record, email your doctor's office, and request medication refills online. 1. In your internet browser, go to [email or web address] 2. Click on the First Time User? Click Here link in the Sign In box. You will see the New Member Sign Up page. 3. Enter your [employer] Access Code exactly as it appears below. You will not need to use this code after youve completed the sign-up process. If you do not sign up before the expiration date, you must request a new code. · [employer] Access Code: [email or web address] Expires: 6/28/2018  2:16 PM 
 
4. Enter the last four digits of your Social Security Number (xxxx) and Date of Birth (mm/dd/yyyy) as indicated and click Submit. You will be taken to the next sign-up page. 5. Create a [employer] ID. This will be your [employer] login ID and cannot be changed, so think of one that is secure and easy to remember. 6. Create a [employer] password. You can change your password at any time. 7. Enter your Password Reset Question and Answer. This can be used at a later time if you forget your password. 8. Enter your e-mail address. You will receive e-mail notification when new information is available in [address] 9. Click Sign Up. You can now view and download portions of your medical record. 10. Click the Download Summary menu link to download a portable copy of your medical information. If you have questions, please visit the Frequently Asked Questions section of the [employer] website. Remember, [employer] is NOT to be used for urgent needs. For medical emergencies, dial 911. Now available from your iPhone and Android! Please provide this summary of care documentation to your next provider. Your primary care clinician is listed as Verlie Boas. If you have any questions after today's visit, please call [phone].

## 2018-03-30 NOTE — PROGRESS NOTES
HISTORY OF PRESENT ILLNESS  Phyllis Baldwin is a 63 y.o. female. HPI   Follow up on chronic medical problems. Overall feeling well. HTN follow up:  Compliant w/ meds, low salt diet, and exercise. No home bp monitoring. No swelling, headache or dizziness. No chest pain, SOB, palpitations. Otherwise feeling well since the last visit. DM type II follow up:  Compliant w/ meds, diabetic diet, and exercise. Obtains home glucose monitoring averaging 100-140. Checks BS daily on most days and prn. Pt does not have BS log at visit today. No Rf needed for today. Denies any tingling sensation, polyuria and polydipsia. No blurred vision. No significant weight changes. Feeling well since last OV. Hypercholesterolemia follow up:  Compliant w/ low fat, low cholesterol diet. Exercising some. Off of Lipitor d/t muscle aching. Patient Active Problem List   Diagnosis Code    DJD (degenerative joint disease) back M19.90    Hypokalemia E87.6    History of hysterectomy, supracervical Z90.711    Mixed hyperlipidemia E78.2    Rotator cuff tear M75.100    Unspecified vitamin D deficiency E55.9    Essential hypertension I10    Type 2 diabetes mellitus without complication ([facility] [address] P12.9    Other osteoarthritis of spine, lumbar region M47.896    Encounter for medication monitoring Z51.81    Rotator cuff arthropathy M12.819    S/P rotator cuff repair Z98.890    Severe obesity (BMI 35.0-39. 9) with comorbidity (HCC) E66.01       Current Outpatient Prescriptions   Medication Sig Dispense Refill    SANTYL 250 unit/gram ointment Apply  to affected area daily. LEFT GREAT TOE  0    meloxicam (MOBIC) 15 mg tablet TAKE ONE TABLET PO EVERY DAY AS NEEDED  0    neomycin-polymyxin-hydrocortisone (CORTISPORIN) otic solution APPLY 1 TO 2 DROPS ON TOE AT DRESSING CHANGES.  APPLY TWICE DAY FOR THE FIRST WEEK THEN ONCE A DAY TILL HEALED  0    ALPRAZolam (XANAX) 0.5 mg tablet TAKE 1 TABLET BY MOUTH ONCE DAILY EVERY NIGHT AT BEDTIME AS NEEDED FOR SLEEP 30 Tab 3    glipiZIDE SR (GLUCOTROL XL) 5 mg CR tablet TAKE 1 TABLET BY MOUTH ONCE DAILY 30 Tab 11    ibuprofen (MOTRIN IB) 200 mg tablet Take 400 mg by mouth every six (6) hours as needed for Pain.  VITAMIN B COMPLEX PO Take 1 Tab by mouth daily.  glucose blood VI test strips (ACCU-CHEK SMARTVIEW TEST STRIP) strip Use to check blood sugar daily and prn. 100 Strip 3    Lancets (ACCU-CHEK FASTCLIX) misc Use to obtain blood sample for glucose testing daily and prn. 100 Each 3    metFORMIN ER (GLUCOPHAGE XR) 500 mg tablet Take 2 Tabs by mouth two (2) times daily (with meals). 360 Tab 3    valsartan-hydrochlorothiazide (DIOVAN-HCT) 320-25 mg per tablet Take 1 Tab by mouth daily. 30 Tab 11    aspirin 81 mg tablet Take 81 mg by mouth daily.          Allergies   Allergen Reactions    Codeine Nausea and Vomiting    Keflex [Cephalexin] Nausea and Vomiting    Lipitor [Atorvastatin] Myalgia    Oxycodone Nausea and Vomiting    Percocet [Oxycodone-Acetaminophen] Nausea and Vomiting    Tramadol Other (comments) and Nausea and Vomiting     dizziness    Vicodin [Hydrocodone-Acetaminophen] Nausea and Vomiting         Past Medical History:   Diagnosis Date    DJD (degenerative joint disease) back 4/10/2010    and neck    DM (diabetes mellitus) ([facility] [address] 4/10/2010    Elevated cholesterol 4/10/2010    HTN (hypertension) 4/10/2010    Hypokalemia 4/10/2010    Unspecified vitamin D deficiency          Past Surgical History:   Procedure Laterality Date    HX HYSTERECTOMY  6/14/04    [address] [place] [address]    HX MOHS PROCEDURES  11/10/2015    right shoulder    HX ORTHOPAEDIC  4/29/13    LEFT ROTATOR CUFF REPAIR      HX ORTHOPAEDIC  06/20/13    shoulder manipulation for frozen shoulder    HX ORTHOPAEDIC  8/5/2014    pt states she had injection by ortho in right groin    HX ORTHOPAEDIC  2/16/2016    right shoulder manipulation    HX ORTHOPAEDIC  11/10/2015    right rotator cuff    HX TONSILLECTOMY age 31    [place] COLONOSCOPY FLX DX W/COLLJ SPEC WHEN PFRMD  03/28/2008    Dr Hoa Stanton         Family History   Problem Relation Age of Onset    Hypertension Mother     Diabetes Mother     Kidney Disease Mother     Heart Attack Father     Bleeding Prob Father      taking coumadin    Bipolar Disorder Sister     Hypertension Sister     No Known Problems Sister        Social History   Substance Use Topics    Smoking status: Never Smoker    Smokeless tobacco: Never Used    Alcohol use No        Lab Results   Component Value Date/Time    WBC 7.6 08/02/2017 09:55 AM    HGB 12.9 08/02/2017 09:55 AM    HCT 38.6 08/02/2017 09:55 AM    PLATELET 572 98/38/7519 09:55 AM    MCV 80 08/02/2017 09:55 AM       Lab Results   Component Value Date/Time    Cholesterol, total 195 12/01/2017 08:37 AM    HDL Cholesterol 51 12/01/2017 08:37 AM    LDL, calculated 130 (H) 12/01/2017 08:37 AM    Triglyceride 70 12/01/2017 08:37 AM    CHOL/HDL Ratio 3.9 05/04/2010 04:50 PM       Lab Results   Component Value Date/Time    TSH 1.020 08/02/2017 09:55 AM      Lab Results   Component Value Date/Time    Sodium 142 12/01/2017 08:37 AM    Potassium 3.9 12/01/2017 08:37 AM    Chloride 101 12/01/2017 08:37 AM    CO2 24 12/01/2017 08:37 AM    Anion gap 11 06/12/2014 09:23 PM    Glucose 176 (H) 12/01/2017 08:37 AM    BUN 18 12/01/2017 08:37 AM    Creatinine 0.67 12/01/2017 08:37 AM    BUN/Creatinine ratio 27 12/01/2017 08:37 AM    GFR est  12/01/2017 08:37 AM    GFR est non-AA 96 12/01/2017 08:37 AM    Calcium 9.3 12/01/2017 08:37 AM    Bilirubin, total 0.5 12/01/2017 08:37 AM    ALT (SGPT) 15 12/01/2017 08:37 AM    AST (SGOT) 19 12/01/2017 08:37 AM    Alk.  phosphatase 112 12/01/2017 08:37 AM    Protein, total 6.8 12/01/2017 08:37 AM    Albumin 4.5 12/01/2017 08:37 AM    Globulin 3.9 06/12/2014 09:23 PM    A-G Ratio 2.0 12/01/2017 08:37 AM      Lab Results   Component Value Date/Time    Hemoglobin A1c 6.9 (H) 02/20/2015 09:57 AM    Hemoglobin A1c (POC) 7.4 12/01/2017 08:37 AM         Review of Systems   Constitutional: Negative for malaise/fatigue. HENT: Negative for congestion. Eyes: Negative for blurred vision. Respiratory: Negative for cough and shortness of breath. Cardiovascular: Negative for chest pain, palpitations and leg swelling. Gastrointestinal: Negative for heartburn, abdominal pain and constipation. Genitourinary: Negative for dysuria, urgency and frequency. Musculoskeletal: Positive for joint pain. Negative for back pain. Neurological: Negative for dizziness, tingling and headaches. Endo/Heme/Allergies: Negative for environmental allergies. Psychiatric/Behavioral: Negative for depression. The patient does not have insomnia. Physical Exam   Constitutional: She appears well-developed and well-nourished. /63 (BP 1 Location: Left arm, BP Patient Position: At rest)  Pulse 75  Temp 98.1 °F (36.7 °C) (Oral)   Resp 18  Ht 5' 2\" (1.575 m)  Wt 210 lb 12.8 oz (95.6 kg)  LMP 06/14/2004  SpO2 98%  BMI 38.56 kg/m2     HENT:   Right Ear: Tympanic membrane and ear canal normal.   Left Ear: Tympanic membrane and ear canal normal.   Nose: No mucosal edema or rhinorrhea. Mouth/Throat: Oropharynx is clear and moist and mucous membranes are normal.   Neck: Normal range of motion. Neck supple. No thyromegaly present. Cardiovascular: Normal rate and regular rhythm. No murmur heard. Pulmonary/Chest: Effort normal and breath sounds normal.   Abdominal: Soft. Bowel sounds are normal. There is no tenderness. Musculoskeletal: Normal range of motion. She exhibits no edema. Lymphadenopathy:     She has no cervical adenopathy. Skin: Skin is warm and dry. Psychiatric: She has a normal mood and affect. Nursing note and vitals reviewed. ASSESSMENT and PLAN  Diagnoses and all orders for this visit:    1.  Essential hypertension  Discussed sodium restriction, high k rich diet, maintaining ideal body weight and regular exercise program such as daily walking 30 min perday 4-5 times per week, as physiologic means to achieve blood pressure control.  Medication compliance advised. 2. Type 2 diabetes mellitus without complication, unspecified whether long term insulin use (HCC)  a1c level is up to 8.4%  -     AMB POC HEMOGLOBIN A1C  -     AMB POC GLUCOSE, QUANTITATIVE, BLOOD  -     MICROALBUMIN, UR, RAND W/ MICROALB/CREAT RATIO  -     AMB POC URINALYSIS DIP STICK AUTO W/O MICRO  -     Increase glipiZIDE SR (GLUCOTROL XL) 5 mg CR tablet; TAKE 2 TABLETS BY MOUTH ONCE DAILY  -     Change to  JANUMET XR 50-1,000 mg TM24; Take 1 Tab by mouth two (2) times daily (with meals). 3. Mixed hyperlipidemia  Continue to monitor. Work on diet and exercise. 4. Encounter for medication monitoring  -     METABOLIC PANEL, BASIC    5. Need for shingles vaccine  -     varicella-zoster recombinant, PF, (SHINGRIX) 50 mcg/0.5 mL susr injection; 0.5 mL by IntraMUSCular route once for 1 dose. Repeat second dose in 6 months. Follow-up Disposition:  Return in about 3 months (around 6/30/2018). reviewed diet, exercise and weight control  cardiovascular risk and specific lipid/LDL goals reviewed  reviewed medications and side effects in detail  specific diabetic recommendations: low cholesterol diet, weight control and daily exercise discussed, home glucose monitoring emphasized, all medications, side effects and compliance discussed carefully, foot care discussed and Podiatry visits discussed, annual eye examinations at Ophthalmology discussed and glycohemoglobin and other lab monitoring discussed     I have discussed diagnosis listed in this note with pt and/or family. I have discussed treatment plans and options and the risk/benefit analysis of those options, including safe use of medications and possible medication side effects. Through the use of shared decision making we have agreed to the above plan.  The patient has received an after-visit summary and questions were answered concerning future plans and follow up. Advise pt of any urgent changes then to proceed to the ER.

## 2018-03-30 NOTE — PROGRESS NOTES
Name and  verified        Chief Complaint   Patient presents with    Hypertension    Diabetes         Health Maintenance reviewed-discussed with patient. 1. Have you been to the ER, urgent care clinic since your last visit? Hospitalized since your last visit? No    2. Have you seen or consulted any other health care providers outside of the [address] [place] [address] since your last visit? Include any pap smears or colon screening. YES, Left great toe in grown toenail removed In . Patient stated has not received Zoster Vaccine.

## 2018-03-31 LAB
ALBUMIN/CREAT UR: <6.4 MG/G CREAT (ref 0–30)
BUN SERPL-MCNC: 16 MG/DL (ref 8–27)
BUN/CREAT SERPL: 26 (ref 12–28)
CALCIUM SERPL-MCNC: 9.7 MG/DL (ref 8.7–10.3)
CHLORIDE SERPL-SCNC: 98 MMOL/L (ref 96–106)
CO2 SERPL-SCNC: 25 MMOL/L (ref 18–29)
CREAT SERPL-MCNC: 0.62 MG/DL (ref 0.57–1)
CREAT UR-MCNC: 46.7 MG/DL
GFR SERPLBLD CREATININE-BSD FMLA CKD-EPI: 113 ML/MIN/1.73
GFR SERPLBLD CREATININE-BSD FMLA CKD-EPI: 98 ML/MIN/1.73
GLUCOSE SERPL-MCNC: 83 MG/DL (ref 65–99)
MICROALBUMIN UR-MCNC: <3 UG/ML
POTASSIUM SERPL-SCNC: 3.8 MMOL/L (ref 3.5–5.2)
SODIUM SERPL-SCNC: 140 MMOL/L (ref 134–144)

## 2018-06-29 ENCOUNTER — OFFICE VISIT (OUTPATIENT)
Dept: FAMILY MEDICINE CLINIC | Age: 63
End: 2018-06-29

## 2018-06-29 VITALS
BODY MASS INDEX: 37.73 KG/M2 | HEIGHT: 62 IN | WEIGHT: 205 LBS | HEART RATE: 74 BPM | TEMPERATURE: 98 F | OXYGEN SATURATION: 97 % | SYSTOLIC BLOOD PRESSURE: 111 MMHG | RESPIRATION RATE: 18 BRPM | DIASTOLIC BLOOD PRESSURE: 60 MMHG

## 2018-06-29 DIAGNOSIS — R82.90 NONSPECIFIC FINDING ON EXAMINATION OF URINE: ICD-10-CM

## 2018-06-29 DIAGNOSIS — E11.9 TYPE 2 DIABETES MELLITUS WITHOUT COMPLICATION, UNSPECIFIED WHETHER LONG TERM INSULIN USE (HCC): ICD-10-CM

## 2018-06-29 DIAGNOSIS — Z23 NEED FOR SHINGLES VACCINE: ICD-10-CM

## 2018-06-29 DIAGNOSIS — E66.9 NON MORBID OBESITY: ICD-10-CM

## 2018-06-29 DIAGNOSIS — E78.2 MIXED HYPERLIPIDEMIA: ICD-10-CM

## 2018-06-29 DIAGNOSIS — Z51.81 ENCOUNTER FOR MEDICATION MONITORING: ICD-10-CM

## 2018-06-29 DIAGNOSIS — I10 ESSENTIAL HYPERTENSION: Primary | ICD-10-CM

## 2018-06-29 LAB
BILIRUB UR QL STRIP: NEGATIVE
GLUCOSE POC: 97 MG/DL
GLUCOSE UR-MCNC: NEGATIVE MG/DL
HBA1C MFR BLD HPLC: 6.2 %
KETONES P FAST UR STRIP-MCNC: NEGATIVE MG/DL
PH UR STRIP: 5 [PH] (ref 4.6–8)
PROT UR QL STRIP: NEGATIVE
SP GR UR STRIP: 1.01 (ref 1–1.03)
UA UROBILINOGEN AMB POC: NORMAL (ref 0.2–1)
URINALYSIS CLARITY POC: CLEAR
URINALYSIS COLOR POC: YELLOW
URINE BLOOD POC: NEGATIVE
URINE LEUKOCYTES POC: NORMAL
URINE NITRITES POC: NEGATIVE

## 2018-06-29 RX ORDER — POLYETHYLENE GLYCOL 3350, SODIUM CHLORIDE, SODIUM BICARBONATE, POTASSIUM CHLORIDE 420; 11.2; 5.72; 1.48 G/4L; G/4L; G/4L; G/4L
POWDER, FOR SOLUTION ORAL
Refills: 0 | COMMUNITY
Start: 2018-04-20 | End: 2018-06-29 | Stop reason: ALTCHOICE

## 2018-06-29 NOTE — MR AVS SNAPSHOT
[address] [facility] 
 
 
 [address] [facility] [address] [address] 
[phone] Patient: Nany Phan MRN: [id number] :[id number] Visit Information Date & Time Provider Department Dept. Phone Encounter #  
 2018  8:00 AM Kaelyn Bowen MD [facility] [phone] [id number] Follow-up Instructions Return in about 4 months (around 2018). Your Appointments 2018 11:00 AM  
ROUTINE CARE with Cari Starr MD  
[facility] [address] - [facility] [address]) Appt Note: Well Woman $0CP SL 9.8.17  
 [address] [facility]. [address] [id number] [address] [place] [address] [address] [place] [address] Upcoming Health Maintenance Date Due ZOSTER VACCINE AGE 60> 2017 Influenza Age 5 to Adult 2018 FOOT EXAM Q1 2018 EYE EXAM RETINAL OR DILATED Q1 2018 HEMOGLOBIN A1C Q6M 2018 LIPID PANEL Q1 2018 MICROALBUMIN Q1 3/30/2019 BREAST CANCER SCRN MAMMOGRAM 10/12/2019 PAP AKA CERVICAL CYTOLOGY 2020 DTaP/Tdap/Td series (2 - Td) 2022 COLONOSCOPY 2028 Allergies as of 2018  Review Complete On: 2018 By: Kaelyn Bowen MD  
  
 Severity Noted Reaction Type Reactions Codeine  04/10/2010    Nausea and Vomiting Keflex [Cephalexin]  04/10/2010    Nausea and Vomiting Lipitor [Atorvastatin]  2015    Myalgia Oxycodone  2017    Nausea and Vomiting Percocet [Oxycodone-acetaminophen]  04/10/2010    Nausea and Vomiting Tramadol  2013    Other (comments), Nausea and Vomiting  
 dizziness Vicodin [Hydrocodone-acetaminophen]  2013    Nausea and Vomiting Current Immunizations  Reviewed on 3/30/2018 Name Date Influenza Nasal Vaccine (Quad) 10/31/2017 Influenza Vaccine 10/11/2016, 10/12/2015 Pneumococcal Polysaccharide (PPSV-23)  Deferred (Patient Refused) Tdap 5/1/2012 Not reviewed this visit You Were Diagnosed With   
  
 Codes Comments Essential hypertension    -  Primary ICD-10-CM: I10 
ICD-9-CM: 401.9 Mixed hyperlipidemia     ICD-10-CM: E78.2 ICD-9-CM: 272.2 Type 2 diabetes mellitus without complication, unspecified whether long term insulin use (HCC)     ICD-10-CM: E11.9 ICD-9-CM: 250.00 Encounter for medication monitoring     ICD-10-CM: Z51.81 
ICD-9-CM: V58.83 Need for shingles vaccine     ICD-10-CM: W57 ICD-9-CM: V04.89 Non morbid obesity     ICD-10-CM: E66.9 ICD-9-CM: 278.00 Vitals BP Pulse Temp Resp Height(growth percentile) Weight(growth percentile) 111/60 (BP 1 Location: Left arm, BP Patient Position: Sitting) 74 98 °F (36.7 °C) (Oral) 18 5' 2\" (1.575 m) 205 lb (93 kg) LMP SpO2 BMI OB Status Smoking Status 06/14/2004 97% 37.49 kg/m2 Hysterectomy Never Smoker Vitals History BMI and BSA Data Body Mass Index Body Surface Area  
 37.49 kg/m 2 2.02 m 2 Preferred Pharmacy Pharmacy Name Phone [address] [facility] [address], [address] [facility] [phone] Your Updated Medication List  
  
   
This list is accurate as of 6/29/18  8:53 AM.  Always use your most recent med list.  
  
  
  
  
 ALPRAZolam 0.5 mg tablet Commonly known as:  XANAX  
TAKE 1 TABLET BY MOUTH ONCE DAILY EVERY NIGHT AT BEDTIME AS NEEDED FOR SLEEP  
  
 aspirin 81 mg tablet Take 81 mg by mouth daily. glipiZIDE SR 5 mg CR tablet Commonly known as:  GLUCOTROL XL  
TAKE 2 TABLETS BY MOUTH ONCE DAILY  
  
 glucose blood VI test strips strip Commonly known as:  [address] Use to check blood sugar daily and prn. JANUMET XR 50-1,000 mg Tm24 Generic drug:  SITagliptin-metFORMIN Take 1 Tab by mouth two (2) times daily (with meals). Lancets Misc Commonly known as:  ACCU-CHEK FASTCLIX Use to obtain blood sample for glucose testing daily and prn.  
  
 meloxicam 15 mg tablet Commonly known as:  MOBIC  
TAKE ONE TABLET PO EVERY DAY AS NEEDED MOTRIN  mg tablet Generic drug:  ibuprofen Take 400 mg by mouth every six (6) hours as needed for Pain.  
  
 valsartan-hydroCHLOROthiazide 320-25 mg per tablet Commonly known as:  DIOVAN-HCT Take 1 Tab by mouth daily. varicella-zoster recombinant (PF) 50 mcg/0.5 mL Susr injection Commonly known as:  SHINGRIX  
0.5 mL by IntraMUSCular route once for 1 dose. Repeat second dose in 6 months. VITAMIN B COMPLEX PO Take 1 Tab by mouth daily. Prescriptions Printed Refills  
 varicella-zoster recombinant, PF, (SHINGRIX) 50 mcg/0.5 mL susr injection 1 Si.5 mL by IntraMUSCular route once for 1 dose. Repeat second dose in 6 months. Class: Print Route: IntraMUSCular We Performed the Following AMB POC GLUCOSE, QUANTITATIVE, BLOOD [53990 CPT(R)] AMB POC HEMOGLOBIN A1C [92693 CPT(R)] AMB POC URINALYSIS DIP STICK AUTO W/ MICRO [98056 CPT(R)] LIPID PANEL [96181 CPT(R)] METABOLIC PANEL, COMPREHENSIVE [07313 CPT(R)] Follow-up Instructions Return in about 4 months (around 2018). Introducing [facility]! Bella Norwood introduces CLEAR patient portal. Now you can access parts of your medical record, email your doctor's office, and request medication refills online. 1. In your internet browser, go to [email or web address] 2. Click on the First Time User? Click Here link in the Sign In box. You will see the New Member Sign Up page. 3. Enter your CLEAR Access Code exactly as it appears below. You will not need to use this code after youve completed the sign-up process. If you do not sign up before the expiration date, you must request a new code. · [employer] Access Code: [email or web address] Expires: 9/27/2018  8:06 AM 
 
4. Enter the last four digits of your Social Security Number (xxxx) and Date of Birth (mm/dd/yyyy) as indicated and click Submit. You will be taken to the next sign-up page. 5. Create a [employer] ID. This will be your [employer] login ID and cannot be changed, so think of one that is secure and easy to remember. 6. Create a [employer] password. You can change your password at any time. 7. Enter your Password Reset Question and Answer. This can be used at a later time if you forget your password. 8. Enter your e-mail address. You will receive e-mail notification when new information is available in [address] 9. Click Sign Up. You can now view and download portions of your medical record. 10. Click the Download Summary menu link to download a portable copy of your medical information. If you have questions, please visit the Frequently Asked Questions section of the [employer] website. Remember, [employer] is NOT to be used for urgent needs. For medical emergencies, dial 911. Now available from your iPhone and Android! Please provide this summary of care documentation to your next provider. Your primary care clinician is listed as Edilma Baca. If you have any questions after today's visit, please call [phone].

## 2018-06-29 NOTE — PROGRESS NOTES
HISTORY OF PRESENT ILLNESS  Suzanne Munoz is a 63 y.o. female. HPI   Follow up on chronic medical problems. Overall feeling well. HTN follow up:  Compliant w/ meds, low salt diet, and exercise. No home bp monitoring. No swelling, headache or dizziness. No chest pain, SOB, palpitations. Otherwise feeling well since the last visit. DM type II follow up:  Compliant w/ meds, diabetic diet, and exercise. Obtains home glucose monitoring averaging 100-140. Checks BS daily on most days and prn. Pt does not have BS log at visit today. No Rf needed for today. Denies any tingling sensation, polyuria and polydipsia. No blurred vision. No significant weight changes. Feeling well since last OV. Hypercholesterolemia follow up:  Compliant w/ low fat, low cholesterol diet. Exercising some. Off of Lipitor d/t muscle aching. Patient Active Problem List   Diagnosis Code    DJD (degenerative joint disease) back M19.90    Hypokalemia E87.6    History of hysterectomy, supracervical Z90.711    Mixed hyperlipidemia E78.2    Rotator cuff tear M75.100    Unspecified vitamin D deficiency E55.9    Essential hypertension I10    Type 2 diabetes mellitus without complication ([facility] [address] P62.5    Other osteoarthritis of spine, lumbar region M47.896    Encounter for medication monitoring Z51.81    Rotator cuff arthropathy M12.819    S/P rotator cuff repair Z98.890    Severe obesity (BMI 35.0-39. 9) with comorbidity (HCC) E66.01       Current Outpatient Prescriptions   Medication Sig Dispense Refill    varicella-zoster recombinant, PF, (SHINGRIX) 50 mcg/0.5 mL susr injection 0.5 mL by IntraMUSCular route once for 1 dose. Repeat second dose in 6 months. 0.5 mL 1    glipiZIDE SR (GLUCOTROL XL) 5 mg CR tablet TAKE 2 TABLETS BY MOUTH ONCE DAILY 60 Tab 11    JANUMET XR 50-1,000 mg TM24 Take 1 Tab by mouth two (2) times daily (with meals).  60 Tab 11    ALPRAZolam (XANAX) 0.5 mg tablet TAKE 1 TABLET BY MOUTH ONCE DAILY EVERY NIGHT AT BEDTIME AS NEEDED FOR SLEEP 30 Tab 3    ibuprofen (MOTRIN IB) 200 mg tablet Take 400 mg by mouth every six (6) hours as needed for Pain.  VITAMIN B COMPLEX PO Take 1 Tab by mouth daily.  glucose blood VI test strips (ACCU-CHEK SMARTVIEW TEST STRIP) strip Use to check blood sugar daily and prn. 100 Strip 3    Lancets (ACCU-CHEK FASTCLIX) misc Use to obtain blood sample for glucose testing daily and prn. 100 Each 3    valsartan-hydrochlorothiazide (DIOVAN-HCT) 320-25 mg per tablet Take 1 Tab by mouth daily. 30 Tab 11    aspirin 81 mg tablet Take 81 mg by mouth daily.       meloxicam (MOBIC) 15 mg tablet TAKE ONE TABLET PO EVERY DAY AS NEEDED  0       Allergies   Allergen Reactions    Codeine Nausea and Vomiting    Keflex [Cephalexin] Nausea and Vomiting    Lipitor [Atorvastatin] Myalgia    Oxycodone Nausea and Vomiting    Percocet [Oxycodone-Acetaminophen] Nausea and Vomiting    Tramadol Other (comments) and Nausea and Vomiting     dizziness    Vicodin [Hydrocodone-Acetaminophen] Nausea and Vomiting       Past Medical History:   Diagnosis Date    DJD (degenerative joint disease) back 4/10/2010    and neck    DM (diabetes mellitus) ([facility] [address] 4/10/2010    Elevated cholesterol 4/10/2010    HTN (hypertension) 4/10/2010    Hypokalemia 4/10/2010    Unspecified vitamin D deficiency        Past Surgical History:   Procedure Laterality Date    HX HYSTERECTOMY  6/14/04    [address] [place] [address]    HX MOHS PROCEDURES  11/10/2015    right shoulder    HX ORTHOPAEDIC  4/29/13    LEFT ROTATOR CUFF REPAIR      HX ORTHOPAEDIC  06/20/13    shoulder manipulation for frozen shoulder    HX ORTHOPAEDIC  8/5/2014    pt states she had injection by ortho in right groin    HX ORTHOPAEDIC  2/16/2016    right shoulder manipulation    HX ORTHOPAEDIC  11/10/2015    right rotator cuff    HX TONSILLECTOMY      age 31    [place] COLONOSCOPY FLX DX W/COLLJ SPEC WHEN PFRMD  03/28/2008    Dr Laxmi Dorantes       Family History Problem Relation Age of Onset    Hypertension Mother     Diabetes Mother     Kidney Disease Mother     Heart Attack Father     Bleeding Prob Father      taking coumadin    Bipolar Disorder Sister     Hypertension Sister     No Known Problems Sister        Social History   Substance Use Topics    Smoking status: Never Smoker    Smokeless tobacco: Never Used    Alcohol use No        Lab Results  Component Value Date/Time   WBC 7.6 08/02/2017 09:55 AM   HGB 12.9 08/02/2017 09:55 AM   HCT 38.6 08/02/2017 09:55 AM   PLATELET 907 72/39/2435 09:55 AM   MCV 80 08/02/2017 09:55 AM     Lab Results  Component Value Date/Time   Cholesterol, total 195 12/01/2017 08:37 AM   HDL Cholesterol 51 12/01/2017 08:37 AM   LDL, calculated 130 (H) 12/01/2017 08:37 AM   Triglyceride 70 12/01/2017 08:37 AM   CHOL/HDL Ratio 3.9 05/04/2010 04:50 PM     Lab Results  Component Value Date/Time   TSH 1.020 08/02/2017 09:55 AM      Lab Results   Component Value Date/Time    Sodium 140 03/30/2018 02:39 PM    Potassium 3.8 03/30/2018 02:39 PM    Chloride 98 03/30/2018 02:39 PM    CO2 25 03/30/2018 02:39 PM    Anion gap 11 06/12/2014 09:23 PM    Glucose 83 03/30/2018 02:39 PM    BUN 16 03/30/2018 02:39 PM    Creatinine 0.62 03/30/2018 02:39 PM    BUN/Creatinine ratio 26 03/30/2018 02:39 PM    GFR est  03/30/2018 02:39 PM    GFR est non-AA 98 03/30/2018 02:39 PM    Calcium 9.7 03/30/2018 02:39 PM    Bilirubin, total 0.5 12/01/2017 08:37 AM    ALT (SGPT) 15 12/01/2017 08:37 AM    AST (SGOT) 19 12/01/2017 08:37 AM    Alk. phosphatase 112 12/01/2017 08:37 AM    Protein, total 6.8 12/01/2017 08:37 AM    Albumin 4.5 12/01/2017 08:37 AM    Globulin 3.9 06/12/2014 09:23 PM    A-G Ratio 2.0 12/01/2017 08:37 AM      Lab Results   Component Value Date/Time    Hemoglobin A1c 6.9 (H) 02/20/2015 09:57 AM    Hemoglobin A1c (POC) 8.4 03/30/2018 02:39 PM         Review of Systems   Constitutional: Negative for malaise/fatigue.    HENT: Negative for congestion. Eyes: Negative for blurred vision. Respiratory: Negative for cough and shortness of breath. Cardiovascular: Negative for chest pain, palpitations and leg swelling. Gastrointestinal: Negative for abdominal pain, constipation and heartburn. Genitourinary: Negative for dysuria, frequency and urgency. Musculoskeletal: Negative for back pain and joint pain. Neurological: Negative for dizziness, tingling and headaches. Endo/Heme/Allergies: Negative for environmental allergies. Psychiatric/Behavioral: Negative for depression. The patient does not have insomnia. Physical Exam   Constitutional: She appears well-developed and well-nourished. /60 (BP 1 Location: Left arm, BP Patient Position: Sitting)  Pulse 74  Temp 98 °F (36.7 °C) (Oral)   Resp 18  Ht 5' 2\" (1.575 m)  Wt 205 lb (93 kg)  LMP 06/14/2004  SpO2 97%  BMI 37.49 kg/m2   HENT:   Right Ear: Tympanic membrane and ear canal normal.   Left Ear: Tympanic membrane and ear canal normal.   Nose: No mucosal edema or rhinorrhea. Mouth/Throat: Oropharynx is clear and moist and mucous membranes are normal.   Neck: Normal range of motion. Neck supple. No thyromegaly present. Cardiovascular: Normal rate and regular rhythm. No murmur heard. Pulmonary/Chest: Effort normal and breath sounds normal.   Abdominal: Soft. Bowel sounds are normal. There is no tenderness. Musculoskeletal: Normal range of motion. She exhibits no edema. Lymphadenopathy:     She has no cervical adenopathy. Skin: Skin is warm and dry. Psychiatric: She has a normal mood and affect. Nursing note and vitals reviewed. ASSESSMENT and PLAN  Diagnoses and all orders for this visit:    1. Essential hypertension  At goal.      2. Mixed hyperlipidemia  -     LIPID PANEL    3.  Type 2 diabetes mellitus without complication, unspecified whether long term insulin use (HCC)  -     AMB POC HEMOGLOBIN A1C  -     AMB POC GLUCOSE, QUANTITATIVE, BLOOD    4. Encounter for medication monitoring  -     METABOLIC PANEL, COMPREHENSIVE  -     AMB POC URINALYSIS DIP STICK AUTO W/ MICRO    5. Need for shingles vaccine  -     varicella-zoster recombinant, PF, (SHINGRIX) 50 mcg/0.5 mL susr injection; 0.5 mL by IntraMUSCular route once for 1 dose. Repeat second dose in 6 months. 6. Non morbid obesity  I have reviewed/discussed the above normal BMI with the patient. I have recommended the following interventions: dietary management education, guidance, and counseling . Follow-up Disposition:  Return in about 4 months (around 11/5/2018). reviewed diet, exercise and weight control  cardiovascular risk and specific lipid/LDL goals reviewed  reviewed medications and side effects in detail  specific diabetic recommendations: low cholesterol diet, weight control and daily exercise discussed and glycohemoglobin and other lab monitoring discussed     I have discussed diagnosis listed in this note with pt and/or family. I have discussed treatment plans and options and the risk/benefit analysis of those options, including safe use of medications and possible medication side effects. Through the use of shared decision making we have agreed to the above plan. The patient has received an after-visit summary and questions were answered concerning future plans and follow up. Advise pt of any urgent changes then to proceed to the ER.

## 2018-06-29 NOTE — PROGRESS NOTES
Chief Complaint   Patient presents with    Hypertension     follow up    Diabetes     follow up           Eye exam:8/7/2017 by Dr. Mukul Arana. Patient has appt in 8/2018    Colonoscopy: 4/25/2018 by Dr. Soto Winston repeat in 10 yrs    Mammogram: 10/12/2017    1. Have you been to the ER, urgent care clinic since your last visit? Hospitalized since your last visit? No    2. Have you seen or consulted any other health care providers outside of the [facility] since your last visit? Include any pap smears or colon screening.  No

## 2018-06-30 LAB
ALBUMIN SERPL-MCNC: 4.5 G/DL (ref 3.6–4.8)
ALBUMIN/GLOB SERPL: 1.7 {RATIO} (ref 1.2–2.2)
ALP SERPL-CCNC: 83 IU/L (ref 39–117)
ALT SERPL-CCNC: 18 IU/L (ref 0–32)
AST SERPL-CCNC: 19 IU/L (ref 0–40)
BACTERIA UR CULT: NORMAL
BILIRUB SERPL-MCNC: 0.4 MG/DL (ref 0–1.2)
BUN SERPL-MCNC: 19 MG/DL (ref 8–27)
BUN/CREAT SERPL: 26 (ref 12–28)
CALCIUM SERPL-MCNC: 9.6 MG/DL (ref 8.7–10.3)
CHLORIDE SERPL-SCNC: 103 MMOL/L (ref 96–106)
CHOLEST SERPL-MCNC: 184 MG/DL (ref 100–199)
CO2 SERPL-SCNC: 23 MMOL/L (ref 20–29)
CREAT SERPL-MCNC: 0.72 MG/DL (ref 0.57–1)
GLOBULIN SER CALC-MCNC: 2.7 G/DL (ref 1.5–4.5)
GLUCOSE SERPL-MCNC: 97 MG/DL (ref 65–99)
HDLC SERPL-MCNC: 50 MG/DL
INTERPRETATION, 910389: NORMAL
LDLC SERPL CALC-MCNC: 122 MG/DL (ref 0–99)
POTASSIUM SERPL-SCNC: 4.2 MMOL/L (ref 3.5–5.2)
PROT SERPL-MCNC: 7.2 G/DL (ref 6–8.5)
SODIUM SERPL-SCNC: 142 MMOL/L (ref 134–144)
TRIGL SERPL-MCNC: 61 MG/DL (ref 0–149)
VLDLC SERPL CALC-MCNC: 12 MG/DL (ref 5–40)

## 2018-08-02 ENCOUNTER — TELEPHONE (OUTPATIENT)
Dept: FAMILY MEDICINE CLINIC | Age: 63
End: 2018-08-02

## 2018-08-02 NOTE — TELEPHONE ENCOUNTER
Pt returning call regarding RX recall please call [phone]. She stated she called pharmacy and they said her RX is fine.

## 2018-10-02 ENCOUNTER — HOSPITAL ENCOUNTER (OUTPATIENT)
Dept: MAMMOGRAPHY | Age: 63
Discharge: HOME | End: 2018-10-02
Attending: FAMILY MEDICINE
Payer: COMMERCIAL

## 2018-10-02 DIAGNOSIS — Z12.31 VISIT FOR SCREENING MAMMOGRAM: ICD-10-CM

## 2018-10-02 PROCEDURE — 77067 SCR MAMMO BI INCL CAD: CPT

## 2018-10-08 NOTE — TELEPHONE ENCOUNTER
Last Visit: 6/29  Next Appt: 11/16  Previous Refill Encounter: 8/31/+3    Requested Prescriptions     Pending Prescriptions Disp Refills    glucose blood VI test strips (ACCU-CHEK SMARTVIEW TEST STRIP) strip 200 Strip 3     Sig: Use to check blood sugar twice daily and prn.

## 2018-10-19 DIAGNOSIS — F51.01 PRIMARY INSOMNIA: ICD-10-CM

## 2018-10-19 NOTE — TELEPHONE ENCOUNTER
Last Visit: 6/29  Next Appt: 11/16  Previous Refill Encounter: 3/15-30+3    Requested Prescriptions     Pending Prescriptions Disp Refills    ALPRAZolam (XANAX) 0.5 mg tablet 30 Tab 3     Sig: TAKE 1 TABLET BY MOUTH ONCE DAILY EVERY NIGHT AT BEDTIME AS NEEDED FOR SLEEP

## 2018-10-22 RX ORDER — ALPRAZOLAM 0.5 MG/1
TABLET ORAL
Qty: 30 TAB | Refills: 3 | OUTPATIENT
Start: 2018-10-22 | End: 2019-04-27 | Stop reason: SDUPTHER

## 2018-11-16 ENCOUNTER — OFFICE VISIT (OUTPATIENT)
Dept: FAMILY MEDICINE CLINIC | Age: 63
End: 2018-11-16

## 2018-11-16 VITALS
TEMPERATURE: 97.2 F | DIASTOLIC BLOOD PRESSURE: 63 MMHG | HEIGHT: 62 IN | SYSTOLIC BLOOD PRESSURE: 111 MMHG | OXYGEN SATURATION: 98 % | WEIGHT: 203 LBS | BODY MASS INDEX: 37.36 KG/M2 | HEART RATE: 65 BPM | RESPIRATION RATE: 18 BRPM

## 2018-11-16 DIAGNOSIS — E66.01 MORBID OBESITY DUE TO EXCESS CALORIES (HCC): ICD-10-CM

## 2018-11-16 DIAGNOSIS — E11.9 TYPE 2 DIABETES MELLITUS WITHOUT COMPLICATION, UNSPECIFIED WHETHER LONG TERM INSULIN USE (HCC): ICD-10-CM

## 2018-11-16 DIAGNOSIS — I10 ESSENTIAL HYPERTENSION: Primary | ICD-10-CM

## 2018-11-16 DIAGNOSIS — E78.2 MIXED HYPERLIPIDEMIA: ICD-10-CM

## 2018-11-16 DIAGNOSIS — Z51.81 ENCOUNTER FOR MEDICATION MONITORING: ICD-10-CM

## 2018-11-16 LAB
GLUCOSE POC: 100 MG/DL
HBA1C MFR BLD HPLC: 6.2 %

## 2018-11-16 RX ORDER — GLIPIZIDE 5 MG/1
TABLET, FILM COATED, EXTENDED RELEASE ORAL
COMMUNITY
Start: 2017-08-30 | End: 2018-11-16 | Stop reason: ALTCHOICE

## 2018-11-16 RX ORDER — ALPRAZOLAM 0.5 MG/1
TABLET ORAL
COMMUNITY
Start: 2017-09-09 | End: 2018-11-16 | Stop reason: ALTCHOICE

## 2018-11-16 RX ORDER — DICLOFENAC SODIUM 75 MG/1
75 TABLET, DELAYED RELEASE ORAL
COMMUNITY
Start: 2017-09-18 | End: 2018-11-16 | Stop reason: ALTCHOICE

## 2018-11-16 RX ORDER — VALSARTAN AND HYDROCHLOROTHIAZIDE 320; 25 MG/1; MG/1
TABLET, FILM COATED ORAL
COMMUNITY
Start: 2017-08-30 | End: 2018-11-16 | Stop reason: SDUPTHER

## 2018-11-16 RX ORDER — METFORMIN HYDROCHLORIDE 500 MG/1
500 TABLET ORAL
COMMUNITY
End: 2018-11-16 | Stop reason: ALTCHOICE

## 2018-11-16 NOTE — PROGRESS NOTES
HISTORY OF PRESENT ILLNESS  Brandi Brewster is a 66 y.o. female. HPI   Follow up on chronic medical problems. Overall feeling well. HTN follow up:  Compliant w/ meds, low salt diet, and exercise. No home bp monitoring. No swelling, headache or dizziness. No chest pain, SOB, palpitations. Otherwise feeling well since the last visit. DM type II follow up:  Compliant w/ meds, diabetic diet, and exercise. Obtains home glucose monitoring averaging 100-140. Checks BS daily on most days and prn. Pt does not have BS log at visit today. No Rf needed for today. Denies any tingling sensation, polyuria and polydipsia. No blurred vision. No significant weight changes. Feeling well since last OV. Hypercholesterolemia follow up:  Compliant w/ low fat, low cholesterol diet. Exercising some. Off of Lipitor d/t muscle aching. Patient Active Problem List   Diagnosis Code    DJD (degenerative joint disease) back M19.90    Hypokalemia E87.6    History of hysterectomy, supracervical Z90.711    Mixed hyperlipidemia E78.2    Rotator cuff tear M75.100    Unspecified vitamin D deficiency E55.9    Essential hypertension I10    Type 2 diabetes mellitus without complication ([facility] [address] V83.4    Other osteoarthritis of spine, lumbar region M47.896    Encounter for medication monitoring Z51.81    Rotator cuff arthropathy M12.819    S/P rotator cuff repair Z98.890    Severe obesity (BMI 35.0-39. 9) with comorbidity (HCC) E66.01       Current Outpatient Medications   Medication Sig Dispense Refill    ALPRAZolam (XANAX) 0.5 mg tablet TAKE 1 TABLET BY MOUTH ONCE DAILY EVERY NIGHT AT BEDTIME AS NEEDED FOR SLEEP 30 Tab 3    glucose blood VI test strips (ACCU-CHEK SMARTVIEW TEST STRIP) strip Use to check blood sugar twice daily and prn.  200 Strip 3    glipiZIDE SR (GLUCOTROL XL) 5 mg CR tablet TAKE 2 TABLETS BY MOUTH ONCE DAILY 60 Tab 11    JANUMET XR 50-1,000 mg TM24 Take 1 Tab by mouth two (2) times daily (with meals). 60 Tab 11    ibuprofen (MOTRIN IB) 200 mg tablet Take 400 mg by mouth every six (6) hours as needed for Pain.  Lancets (ACCU-CHEK FASTCLIX) misc Use to obtain blood sample for glucose testing daily and prn. 100 Each 3    valsartan-hydrochlorothiazide (DIOVAN-HCT) 320-25 mg per tablet Take 1 Tab by mouth daily. 30 Tab 11    aspirin 81 mg tablet Take 81 mg by mouth daily.  VITAMIN B COMPLEX PO Take 1 Tab by mouth daily.          Allergies   Allergen Reactions    Codeine Nausea and Vomiting    Keflex [Cephalexin] Nausea and Vomiting    Lipitor [Atorvastatin] Myalgia    Oxycodone Nausea and Vomiting    Percocet [Oxycodone-Acetaminophen] Nausea and Vomiting    Tramadol Other (comments) and Nausea and Vomiting     dizziness    Vicodin [Hydrocodone-Acetaminophen] Nausea and Vomiting         Past Medical History:   Diagnosis Date    DJD (degenerative joint disease) back 4/10/2010    and neck    DM (diabetes mellitus) ([facility] [address] 4/10/2010    Elevated cholesterol 4/10/2010    HTN (hypertension) 4/10/2010    Hypokalemia 4/10/2010    Unspecified vitamin D deficiency          Past Surgical History:   Procedure Laterality Date    HX HYSTERECTOMY  6/14/04    [address]    HX MOHS PROCEDURES  11/10/2015    right shoulder    HX ORTHOPAEDIC  4/29/13    LEFT ROTATOR CUFF REPAIR      HX ORTHOPAEDIC  06/20/13    shoulder manipulation for frozen shoulder    HX ORTHOPAEDIC  8/5/2014    pt states she had injection by ortho in right groin    HX ORTHOPAEDIC  2/16/2016    right shoulder manipulation    HX ORTHOPAEDIC  11/10/2015    right rotator cuff    HX TONSILLECTOMY      age 31    [place] COLON[place]COPY FLX DX W/COLLJ SPEC WHEN PFRMD  03/28/2008    Dr Cait Valencia         Family History   Problem Relation Age of Onset    Hypertension Mother     Diabetes Mother     Kidney Disease Mother     Heart Attack Father     Bleeding Prob Father         taking coumadin    Bipolar Disorder Sister     Hypertension Sister     No Known Problems Sister        Social History     Tobacco Use    Smoking status: Never Smoker    Smokeless tobacco: Never Used   Substance Use Topics    Alcohol use: No     Alcohol/week: 0.0 oz        Lab Results   Component Value Date/Time    WBC 7.6 08/02/2017 09:55 AM    HGB 12.9 08/02/2017 09:55 AM    HCT 38.6 08/02/2017 09:55 AM    PLATELET 446 88/31/6164 09:55 AM    MCV 80 08/02/2017 09:55 AM     Lab Results   Component Value Date/Time    Cholesterol, total 184 06/29/2018 09:09 AM    HDL Cholesterol 50 06/29/2018 09:09 AM    LDL, calculated 122 (H) 06/29/2018 09:09 AM    Triglyceride 61 06/29/2018 09:09 AM    CHOL/HDL Ratio 3.9 05/04/2010 04:50 PM     Lab Results   Component Value Date/Time    TSH 1.020 08/02/2017 09:55 AM      Lab Results   Component Value Date/Time    Sodium 142 06/29/2018 09:09 AM    Potassium 4.2 06/29/2018 09:09 AM    Chloride 103 06/29/2018 09:09 AM    CO2 23 06/29/2018 09:09 AM    Anion gap 11 06/12/2014 09:23 PM    Glucose 97 06/29/2018 09:09 AM    BUN 19 06/29/2018 09:09 AM    Creatinine 0.72 06/29/2018 09:09 AM    BUN/Creatinine ratio 26 06/29/2018 09:09 AM    GFR est  06/29/2018 09:09 AM    GFR est non-AA 91 06/29/2018 09:09 AM    Calcium 9.6 06/29/2018 09:09 AM    Bilirubin, total 0.4 06/29/2018 09:09 AM    ALT (SGPT) 18 06/29/2018 09:09 AM    AST (SGOT) 19 06/29/2018 09:09 AM    Alk. phosphatase 83 06/29/2018 09:09 AM    Protein, total 7.2 06/29/2018 09:09 AM    Albumin 4.5 06/29/2018 09:09 AM    Globulin 3.9 06/12/2014 09:23 PM    A-G Ratio 1.7 06/29/2018 09:09 AM      Lab Results   Component Value Date/Time    Hemoglobin A1c 6.9 (H) 02/20/2015 09:57 AM    Hemoglobin A1c (POC) 6.2 11/16/2018 09:57 AM         Review of Systems   Constitutional: Negative for malaise/fatigue. HENT: Negative for congestion. Eyes: Negative for blurred vision. Respiratory: Negative for cough and shortness of breath.     Cardiovascular: Negative for chest pain, palpitations and leg swelling. Gastrointestinal: Negative for abdominal pain, constipation and heartburn. Genitourinary: Negative for dysuria, frequency and urgency. Musculoskeletal: Negative for back pain and joint pain. Neurological: Negative for dizziness, tingling and headaches. Endo/Heme/Allergies: Negative for environmental allergies. Psychiatric/Behavioral: Negative for depression. The patient does not have insomnia. Physical Exam   Constitutional: She appears well-developed and well-nourished. /63 (BP 1 Location: Left arm, BP Patient Position: At rest)   Pulse 65   Temp 97.2 °F (36.2 °C) (Oral)   Resp 18   Ht 5' 2\" (1.575 m)   Wt 203 lb (92.1 kg)   LMP 06/14/2004   SpO2 98%   BMI 37.13 kg/m²     HENT:   Right Ear: Tympanic membrane and ear canal normal.   Left Ear: Tympanic membrane and ear canal normal.   Nose: No mucosal edema or rhinorrhea. Mouth/Throat: Oropharynx is clear and moist and mucous membranes are normal.   Neck: Normal range of motion. Neck supple. No thyromegaly present. Cardiovascular: Normal rate and regular rhythm. No murmur heard. Pulmonary/Chest: Effort normal and breath sounds normal.   Abdominal: Soft. Bowel sounds are normal. There is no tenderness. Musculoskeletal: Normal range of motion. She exhibits no edema. Lymphadenopathy:     She has no cervical adenopathy. Skin: Skin is warm and dry. Psychiatric: She has a normal mood and affect. Nursing note and vitals reviewed. ASSESSMENT and PLAN  Diagnoses and all orders for this visit:    1. Essential hypertension  Stable     2. Type 2 diabetes mellitus without complication, unspecified whether long term insulin use (HCC)  -     AMB POC HEMOGLOBIN A1C  -     AMB POC GLUCOSE, QUANTITATIVE, BLOOD    3. Mixed hyperlipidemia  -     LIPID PANEL  We discussed trying to start with another statin pending her results form today.       4. Encounter for medication monitoring  -     METABOLIC PANEL, COMPREHENSIVE    5. Morbid obesity due to excess calories ([facility] [address]  I have reviewed/discussed the above normal BMI with the patient. I have recommended the following interventions: dietary management education, guidance, and counseling . Follow-up Disposition:  Return in about 4 months (around 3/16/2019). reviewed diet, exercise and weight control  cardiovascular risk and specific lipid/LDL goals reviewed  reviewed medications and side effects in detail  specific diabetic recommendations: low cholesterol diet, weight control and daily exercise discussed, home glucose monitoring emphasized, all medications, side effects and compliance discussed carefully, foot care discussed and Podiatry visits discussed, annual eye examinations at Ophthalmology discussed and glycohemoglobin and other lab monitoring discussed     I have discussed diagnosis listed in this note with pt and/or family. I have discussed treatment plans and options and the risk/benefit analysis of those options, including safe use of medications and possible medication side effects. Through the use of shared decision making we have agreed to the above plan. The patient has received an after-visit summary and questions were answered concerning future plans and follow up. Advise pt of any urgent changes then to proceed to the ER.

## 2018-11-16 NOTE — PROGRESS NOTES
Name and  verified      Chief Complaint   Patient presents with    Hypertension    Diabetes       1. Have you been to the ER, urgent care clinic since your last visit? Hospitalized since your last visit? no    2. Have you seen or consulted any other health care providers outside of the [address] [place] [address] since your last visit? Include any pap smears or colon screening. no      Patient stated last eye exam  and she stated hand delivered form to this office.

## 2018-11-17 LAB
ALBUMIN SERPL-MCNC: 4.5 G/DL (ref 3.6–4.8)
ALBUMIN/GLOB SERPL: 1.7 {RATIO} (ref 1.2–2.2)
ALP SERPL-CCNC: 81 IU/L (ref 39–117)
ALT SERPL-CCNC: 19 IU/L (ref 0–32)
AST SERPL-CCNC: 23 IU/L (ref 0–40)
BILIRUB SERPL-MCNC: 0.5 MG/DL (ref 0–1.2)
BUN SERPL-MCNC: 14 MG/DL (ref 8–27)
BUN/CREAT SERPL: 18 (ref 12–28)
CALCIUM SERPL-MCNC: 9.4 MG/DL (ref 8.7–10.3)
CHLORIDE SERPL-SCNC: 103 MMOL/L (ref 96–106)
CHOLEST SERPL-MCNC: 189 MG/DL (ref 100–199)
CO2 SERPL-SCNC: 26 MMOL/L (ref 20–29)
CREAT SERPL-MCNC: 0.8 MG/DL (ref 0.57–1)
GLOBULIN SER CALC-MCNC: 2.6 G/DL (ref 1.5–4.5)
GLUCOSE SERPL-MCNC: 95 MG/DL (ref 65–99)
HDLC SERPL-MCNC: 54 MG/DL
INTERPRETATION, 910389: NORMAL
LDLC SERPL CALC-MCNC: 123 MG/DL (ref 0–99)
POTASSIUM SERPL-SCNC: 4.4 MMOL/L (ref 3.5–5.2)
PROT SERPL-MCNC: 7.1 G/DL (ref 6–8.5)
SODIUM SERPL-SCNC: 143 MMOL/L (ref 134–144)
TRIGL SERPL-MCNC: 59 MG/DL (ref 0–149)
VLDLC SERPL CALC-MCNC: 12 MG/DL (ref 5–40)

## 2018-11-19 RX ORDER — PREDNISONE 10 MG/1
10 TABLET ORAL
Qty: 10 TAB | Refills: 0 | OUTPATIENT
Start: 2018-11-19 | End: 2018-11-24

## 2018-11-19 RX ORDER — PREDNISONE 10 MG/1
10 TABLET ORAL 2 TIMES DAILY
Qty: 10 TAB | Refills: 0 | Status: SHIPPED | OUTPATIENT
Start: 2018-11-19 | End: 2018-11-24

## 2018-11-19 NOTE — TELEPHONE ENCOUNTER
Patient states that she saw Norberto Ceja on Nov.16 she was suppose to put in a RX for (l) heel it was inflame she went to [facility] and they told her it wasn't a RX for her please give her a call @ [phone]

## 2018-11-19 NOTE — TELEPHONE ENCOUNTER
Called patient LM Dr. Angy Fuentes will be sending prednisone 10mg 1 tab twice a day for 5 days.  Patient verbalized understanding

## 2018-11-27 ENCOUNTER — DOCUMENTATION ONLY (OUTPATIENT)
Dept: FAMILY MEDICINE CLINIC | Age: 63
End: 2018-11-27

## 2018-11-27 NOTE — PROGRESS NOTES
Left message for patient that Larissa Marin paper work was completed and faxed to [phone], copy is at  ready for  if needed.

## 2018-12-20 ENCOUNTER — TELEPHONE (OUTPATIENT)
Dept: FAMILY MEDICINE CLINIC | Age: 63
End: 2018-12-20

## 2018-12-20 DIAGNOSIS — M25.572 LEFT ANKLE PAIN, UNSPECIFIED CHRONICITY: Primary | ICD-10-CM

## 2018-12-20 NOTE — TELEPHONE ENCOUNTER
Patient states her ankle swells during the day, goes down over night. She is using ibuprofen 2 every 6 hours for discomfort which helps some. She is on her feet all day. She has also use ice/heat.

## 2018-12-20 NOTE — TELEPHONE ENCOUNTER
Patient states that she saw Stanislaw Mac on Nov.16 for her ankle she gave her prednisone she states that she is still in pain she can be reached @ [phone] or [phone]

## 2018-12-20 NOTE — TELEPHONE ENCOUNTER
Order placed for referral to ortho per Verbal Order from Dr. Ricky Bernal on 12/20/2018 due to ankle pain.     Patient aware that she will be called by ortho for an appointment

## 2019-01-24 ENCOUNTER — OFFICE VISIT (OUTPATIENT)
Dept: FAMILY MEDICINE CLINIC | Age: 64
End: 2019-01-24

## 2019-01-24 VITALS
BODY MASS INDEX: 38.57 KG/M2 | WEIGHT: 209.6 LBS | TEMPERATURE: 99.7 F | SYSTOLIC BLOOD PRESSURE: 148 MMHG | RESPIRATION RATE: 16 BRPM | HEIGHT: 62 IN | HEART RATE: 94 BPM | DIASTOLIC BLOOD PRESSURE: 80 MMHG | OXYGEN SATURATION: 100 %

## 2019-01-24 DIAGNOSIS — R68.89 FLU-LIKE SYMPTOMS: ICD-10-CM

## 2019-01-24 DIAGNOSIS — J06.9 VIRAL URI WITH COUGH: Primary | ICD-10-CM

## 2019-01-24 LAB
FLUAV+FLUBV AG NOSE QL IA.RAPID: NEGATIVE POS/NEG
FLUAV+FLUBV AG NOSE QL IA.RAPID: NEGATIVE POS/NEG
VALID INTERNAL CONTROL?: YES

## 2019-01-24 RX ORDER — CYCLOBENZAPRINE HCL 10 MG
TABLET ORAL
COMMUNITY
Start: 2019-01-01 | End: 2019-01-24

## 2019-01-24 RX ORDER — GABAPENTIN 300 MG/1
300 CAPSULE ORAL
COMMUNITY
Start: 2019-01-16 | End: 2020-01-15

## 2019-01-24 RX ORDER — FLUTICASONE PROPIONATE 50 MCG
SPRAY, SUSPENSION (ML) NASAL
Qty: 1 BOTTLE | Refills: 0 | Status: SHIPPED | OUTPATIENT
Start: 2019-01-24 | End: 2019-04-19

## 2019-01-24 RX ORDER — LORAZEPAM 1 MG/1
TABLET ORAL
COMMUNITY
Start: 2019-01-16 | End: 2019-04-19

## 2019-01-24 RX ORDER — BENZONATATE 200 MG/1
200 CAPSULE ORAL
Qty: 21 CAP | Refills: 0 | Status: SHIPPED | OUTPATIENT
Start: 2019-01-24 | End: 2019-01-28

## 2019-01-24 RX ORDER — GABAPENTIN 300 MG/1
CAPSULE ORAL
Refills: 3 | COMMUNITY
Start: 2019-01-16 | End: 2019-01-24 | Stop reason: SDUPTHER

## 2019-01-24 RX ORDER — PREDNISONE 10 MG/1
TABLET ORAL
COMMUNITY
Start: 2018-11-19 | End: 2019-01-24 | Stop reason: ALTCHOICE

## 2019-01-24 RX ORDER — IBUPROFEN 200 MG
400 TABLET ORAL
COMMUNITY
End: 2019-01-24 | Stop reason: SDUPTHER

## 2019-01-24 NOTE — PATIENT INSTRUCTIONS
Viral Respiratory Infection: Care Instructions  Your Care Instructions    Viruses are very small organisms. They grow in number after they enter your body. There are many types that cause different illnesses, such as colds and the mumps. The symptoms of a viral respiratory infection often start quickly. They include a fever, sore throat, and runny nose. You may also just not feel well. Or you may not want to eat much. Most viral respiratory infections are not serious. They usually get better with time and self-care. Antibiotics are not used to treat a viral infection. That's because antibiotics will not help cure a viral illness. In some cases, antiviral medicine can help your body fight a serious viral infection. Follow-up care is a key part of your treatment and safety. Be sure to make and go to all appointments, and call your doctor if you are having problems. It's also a good idea to know your test results and keep a list of the medicines you take. How can you care for yourself at home? · Rest as much as possible until you feel better. · Be safe with medicines. Take your medicine exactly as prescribed. Call your doctor if you think you are having a problem with your medicine. You will get more details on the specific medicine your doctor prescribes. · Take an over-the-counter pain medicine, such as acetaminophen (Tylenol), ibuprofen (Advil, Motrin), or naproxen (Aleve), as needed for pain and fever. Read and follow all instructions on the label. Do not give aspirin to anyone younger than 20. It has been linked to Reye syndrome, a serious illness. · Drink plenty of fluids, enough so that your urine is light yellow or clear like water. Hot fluids, such as tea or soup, may help relieve congestion in your nose and throat. If you have kidney, heart, or liver disease and have to limit fluids, talk with your doctor before you increase the amount of fluids you drink.   · Try to clear mucus from your lungs by breathing deeply and coughing. · Gargle with warm salt water once an hour. This can help reduce swelling and throat pain. Use 1 teaspoon of salt mixed in 1 cup of warm water. · Do not smoke or allow others to smoke around you. If you need help quitting, talk to your doctor about stop-smoking programs and medicines. These can increase your chances of quitting for good. To avoid spreading the virus  · Cough or sneeze into a tissue. Then throw the tissue away. · If you don't have a tissue, use your hand to cover your cough or sneeze. Then clean your hand. You can also cough into your sleeve. · Wash your hands often. Use soap and warm water. Wash for 15 to 20 seconds each time. · If you don't have soap and water near you, you can clean your hands with alcohol wipes or gel. When should you call for help? Call your doctor now or seek immediate medical care if:    · You have a new or higher fever.     · Your fever lasts more than 48 hours.     · You have trouble breathing.     · You have a fever with a stiff neck or a severe headache.     · You are sensitive to light.     · You feel very sleepy or confused.    Watch closely for changes in your health, and be sure to contact your doctor if:    · You do not get better as expected. Where can you learn more? Go to http://deny-cuate.info/. Enter O353 in the search box to learn more about \"Viral Respiratory Infection: Care Instructions. \"  Current as of: September 5, 2018  Content Version: 11.9  © 3461-9435 [employer]. Care instructions adapted under license by [employer] (which disclaims liability or warranty for this information). If you have questions about a medical condition or this instruction, always ask your healthcare professional. [address] any warranty or liability for your use of this information.

## 2019-01-24 NOTE — PROGRESS NOTES
HISTORY OF PRESENT ILLNESS  Shay Michaels is a 66 y.o. female. 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 Symptoms started on Tuesday, 2 days ago. She endorses head congestion, body aches, low-grade fever (99.4), nasal congestion, R ear pain, productive cough. Feels fatigued. She is a non-smoker. Denies history of underlying lung disease. She was swabbed at work yesterday at [employer] and was negative for flu but positive for RSV. Was told to just \"ride out her symptoms. \"  Has taken shalini-seltzer for her symptoms, but was told to stop it due to her high blood pressure. Has since been taking colin, drinking lots of water. Visit Vitals  /80 (BP 1 Location: Right arm, BP Patient Position: Sitting)   Pulse 94   Temp 99.7 °F (37.6 °C) (Oral)   Resp 16   Ht 5' 2\" (1.575 m)   Wt 209 lb 9.6 oz (95.1 kg)   LMP 06/14/2004   SpO2 100%   BMI 38.34 kg/m²     Current Outpatient Medications on File Prior to Visit   Medication Sig Dispense Refill    gabapentin (NEURONTIN) 300 mg capsule Take 300 mg by mouth nightly.  glucose blood VI test strips (ACCU-CHEK SMARTVIEW TEST STRIP) strip Use to check blood sugar twice daily and prn. 200 Strip 3    glipiZIDE SR (GLUCOTROL XL) 5 mg CR tablet TAKE 2 TABLETS BY MOUTH ONCE DAILY 60 Tab 11    JANUMET XR 50-1,000 mg TM24 Take 1 Tab by mouth two (2) times daily (with meals). 60 Tab 11    VITAMIN B COMPLEX PO Take 1 Tab by mouth daily.  Lancets (ACCU-CHEK FASTCLIX) misc Use to obtain blood sample for glucose testing daily and prn. 100 Each 3    valsartan-hydrochlorothiazide (DIOVAN-HCT) 320-25 mg per tablet Take 1 Tab by mouth daily. 30 Tab 11    aspirin 81 mg tablet Take 81 mg by mouth daily.       LORazepam (ATIVAN) 1 mg tablet Take one pill 2 hours prior to injection, may take one more pill 30 minutes prior to injection if needed      ALPRAZolam (XANAX) 0.5 mg tablet TAKE 1 TABLET BY MOUTH ONCE DAILY EVERY NIGHT AT BEDTIME AS NEEDED FOR SLEEP 30 Tab 3    ibuprofen (MOTRIN IB) 200 mg tablet Take 400 mg by mouth every six (6) hours as needed for Pain. No current facility-administered medications on file prior to visit. Review of Systems   Constitutional: Positive for fever and malaise/fatigue. Negative for chills. HENT: Positive for congestion, ear pain and sinus pain. Negative for ear discharge and sore throat. Eyes: Negative for blurred vision, double vision, pain and discharge. Respiratory: Positive for cough and sputum production. Negative for shortness of breath and wheezing. Cardiovascular: Negative for chest pain and palpitations. Neurological: Negative for weakness and headaches. Physical Exam   Constitutional: She is oriented to person, place, and time. She appears well-developed and well-nourished. No distress. HENT:   Head: Normocephalic and atraumatic. Right Ear: Tympanic membrane, external ear and ear canal normal.   Left Ear: Tympanic membrane, external ear and ear canal normal.   Nose: Mucosal edema and rhinorrhea present. Right sinus exhibits no maxillary sinus tenderness and no frontal sinus tenderness. Left sinus exhibits no maxillary sinus tenderness and no frontal sinus tenderness. Mouth/Throat: Uvula is midline and mucous membranes are normal. Posterior oropharyngeal erythema present. No oropharyngeal exudate or posterior oropharyngeal edema.   + PND   Cardiovascular: Normal rate, regular rhythm and normal heart sounds. Pulmonary/Chest: Effort normal and breath sounds normal. No respiratory distress. She has no wheezes. Neurological: She is alert and oriented to person, place, and time. Skin: Skin is warm and dry. She is not diaphoretic. Psychiatric: She has a normal mood and affect.  Her behavior is normal. Judgment normal.   Nursing note and vitals reviewed. ASSESSMENT and PLAN    ICD-10-CM ICD-9-CM    1. Viral URI with cough J06.9 465.9 benzonatate (TESSALON) 200 mg capsule    B97.89  fluticasone (FLONASE) 50 mcg/actuation nasal spray   2. Flu-like symptoms R68.89 780.99 AMB POC RENE INFLUENZA A/B TEST     1. Viral URI with cough - Flu swab negative. Recommended supportive care, including rest, fluids, lozenges, as well as Rx treatment with tessalon and flonase for symptom management. Patient advised to call me if symptoms worsen or fail to improve by Monday, Jan 28; would treat for sinusitis at that time, but not indicated today. Work note provided. Follow up with me PRN, and with PCP as recommended. Follow-up Disposition:  Return if symptoms worsen or fail to improve.

## 2019-01-24 NOTE — PROGRESS NOTES
Chief Complaint   Patient presents with    Nasal Congestion    Fever    Cough    Ear Pain     right     Patient here for nasal congestion, fever, productive cough and right ear pain. No ER visits. Patient due for nerve block procedure but is rescheduling.

## 2019-01-24 NOTE — LETTER
NOTIFICATION RETURN TO WORK / SCHOOL 
 
1/24/2019 1:00 PM 
 
Ms. Codi Simon [address] [place] [address] [address]. [address]a [phone] To Whom It May Concern: 
 
Codi Simon is currently under the care of [facility]. She will return to work/school on: Monday, January 28, 2019. If there are questions or concerns please have the patient contact our office. Sincerely, Mariely Maki NP

## 2019-01-28 ENCOUNTER — OFFICE VISIT (OUTPATIENT)
Dept: FAMILY MEDICINE CLINIC | Age: 64
End: 2019-01-28

## 2019-01-28 VITALS
HEART RATE: 79 BPM | WEIGHT: 212 LBS | DIASTOLIC BLOOD PRESSURE: 65 MMHG | TEMPERATURE: 97.7 F | SYSTOLIC BLOOD PRESSURE: 131 MMHG | HEIGHT: 62 IN | BODY MASS INDEX: 39.01 KG/M2 | RESPIRATION RATE: 16 BRPM | OXYGEN SATURATION: 100 %

## 2019-01-28 DIAGNOSIS — J01.00 ACUTE MAXILLARY SINUSITIS, RECURRENCE NOT SPECIFIED: Primary | ICD-10-CM

## 2019-01-28 DIAGNOSIS — B00.1 COLD SORE: ICD-10-CM

## 2019-01-28 RX ORDER — ACETAMINOPHEN 325 MG/1
325 TABLET ORAL
COMMUNITY
End: 2019-08-27

## 2019-01-28 RX ORDER — VALACYCLOVIR HYDROCHLORIDE 1 G/1
1000 TABLET, FILM COATED ORAL 2 TIMES DAILY
Qty: 10 TAB | Refills: 0 | Status: SHIPPED | OUTPATIENT
Start: 2019-01-28 | End: 2019-02-02

## 2019-01-28 RX ORDER — AMOXICILLIN AND CLAVULANATE POTASSIUM 875; 125 MG/1; MG/1
1 TABLET, FILM COATED ORAL 2 TIMES DAILY
Qty: 20 TAB | Refills: 0 | Status: SHIPPED | OUTPATIENT
Start: 2019-01-28 | End: 2019-02-07

## 2019-01-28 NOTE — PROGRESS NOTES
Chief Complaint   Patient presents with    Cough     productive    Blister    Headache     Patient states she is still having productive cough, headache and now has fever blister on upper lip. Stopped tessalon capsules on Saturday because of hives that are gone and itching.

## 2019-01-28 NOTE — PROGRESS NOTES
HISTORY OF PRESENT ILLNESS  Marly Espinoza is a 66 y.o. female. 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 Was seen 1/24/19 for URI with cough; had negative flu swab and had tested positive for RSV the previous day at work. Symptoms started 1/22/19. Has had head congestion, low-grade fever, nasal congestion, productive cough. Was started on tessalon for cough and advised to continue supportive care. However, 2 days later developed hives on bilateral upper extremities, which were very itchy; took benadryl and stopped the tessalon, and symptoms resolved. However, also developed a painful \"fever blister\" on her upper lip at that same time. Has been trying Carmex on it without relief. Has not tried any other OTC medications for her symptoms, which she thinks are worse today than they were last week. She is a non-smoker and has no underlying lung disease. The persistent cough and sinus headache are her major complaints today. Is nervous to try another cough medication. Visit Vitals  /65 (BP 1 Location: Right arm, BP Patient Position: Sitting)   Pulse 79   Temp 97.7 °F (36.5 °C) (Oral)   Resp 16   Ht 5' 2\" (1.575 m)   Wt 212 lb (96.2 kg)   LMP 06/14/2004   SpO2 100%   BMI 38.78 kg/m²     Current Outpatient Medications on File Prior to Visit   Medication Sig Dispense Refill    acetaminophen (TYLENOL) 325 mg tablet Take  by mouth every four (4) hours as needed for Pain.  gabapentin (NEURONTIN) 300 mg capsule Take 300 mg by mouth nightly.  fluticasone (FLONASE) 50 mcg/actuation nasal spray 2 sprays in each nostril once daily as needed for nasal congestion. 1 Bottle 0    glucose blood VI test strips (ACCU-CHEK SMARTVIEW TEST STRIP) strip Use to check blood sugar twice daily and prn.  200 Strip 3    glipiZIDE SR (GLUCOTROL XL) 5 mg CR tablet TAKE 2 TABLETS BY MOUTH ONCE DAILY 60 Tab 11    JANUMET XR 50-1,000 mg TM24 Take 1 Tab by mouth two (2) times daily (with meals). 60 Tab 11    VITAMIN B COMPLEX PO Take 1 Tab by mouth daily.  Lancets (ACCU-CHEK FASTCLIX) [facility] Use to obtain blood sample for glucose testing daily and prn. 100 Each 3    valsartan-hydrochlorothiazide (DIOVAN-HCT) 320-25 mg per tablet Take 1 Tab by mouth daily. 30 Tab 11    aspirin 81 mg tablet Take 81 mg by mouth daily.  LORazepam (ATIVAN) 1 mg tablet Take one pill 2 hours prior to injection, may take one more pill 30 minutes prior to injection if needed      ALPRAZolam (XANAX) 0.5 mg tablet TAKE 1 TABLET BY MOUTH ONCE DAILY EVERY NIGHT AT BEDTIME AS NEEDED FOR SLEEP 30 Tab 3    ibuprofen (MOTRIN IB) 200 mg tablet Take 400 mg by mouth every six (6) hours as needed for Pain. No current facility-administered medications on file prior to visit. Review of Systems   Constitutional: Negative for chills, fever and malaise/fatigue. HENT: Positive for congestion and sinus pain. Negative for ear discharge, ear pain and sore throat. Eyes: Negative for blurred vision, double vision, pain and discharge. Respiratory: Positive for cough and sputum production. Negative for shortness of breath and wheezing. Cardiovascular: Negative for chest pain and palpitations. Skin:        \"fever blister\" upper lip   Neurological: Negative for weakness and headaches. Physical Exam   Constitutional: She is oriented to person, place, and time. She appears well-developed and well-nourished. No distress. HENT:   Head: Normocephalic and atraumatic. Right Ear: Tympanic membrane, external ear and ear canal normal.   Left Ear: Tympanic membrane, external ear and ear canal normal.   Nose: Mucosal edema and rhinorrhea present. Right sinus exhibits maxillary sinus tenderness. Right sinus exhibits no frontal sinus tenderness. Left sinus exhibits maxillary sinus tenderness.  Left sinus exhibits no frontal sinus tenderness. Mouth/Throat: Posterior oropharyngeal erythema present. No oropharyngeal exudate or posterior oropharyngeal edema.   + PND   Eyes: Conjunctivae and EOM are normal. Pupils are equal, round, and reactive to light. Right eye exhibits no discharge. Left eye exhibits no discharge. Neck: Neck supple. Cardiovascular: Normal rate, regular rhythm and normal heart sounds. Pulmonary/Chest: Effort normal and breath sounds normal. No respiratory distress. She has no wheezes. She has no rales. Lymphadenopathy:     She has no cervical adenopathy. Neurological: She is alert and oriented to person, place, and time. Skin: Skin is warm and dry. She is not diaphoretic.        1 square inch round lesion located medially between nose and lip; (?) vesicular, may be peeling skin   Nursing note and vitals reviewed. ASSESSMENT and PLAN    ICD-10-CM ICD-9-CM    1. Acute maxillary sinusitis, recurrence not specified J01.00 461.0 amoxicillin-clavulanate (AUGMENTIN) 875-125 mg per tablet   2. Cold sore B00.1 054.9 valACYclovir (VALTREX) 1 gram tablet     1. Sinusitis - At this point, as symptoms have persisted > 1 week and are worsening, will treat with augmentin BID x 10 days (has keflex-allergy but reports that she has tolerated PCNs in the past). Declines Rx for another antitussive; recommended robitussin and/or coricidan HBP in the interim for symptom relief. Work note provided through 1/29/19 per patient request. Tessalon added to allergy list.   2. Cold sore (?) - I am not sure if this is a cold sore or if it is instead another allergic reaction to the tessalon. Will Rx valtrex 1g BID x 5 days; stop using carmex on the site. Follow up if it doesn't improve. Follow up with me PRN, and with PCP as recommended/scheduled. Follow-up Disposition:  Return if symptoms worsen or fail to improve.

## 2019-01-28 NOTE — LETTER
NOTIFICATION RETURN TO WORK / SCHOOL 
 
1/28/2019 10:14 AM 
 
Ms. Shay Michaels [address] [place] [address] [address]. [phone] To Whom It May Concern: 
 
Shay Michaels is currently under the care of [facility]. She will return to work/school on: Wednesday, January 30, 2019. If there are questions or concerns please have the patient contact our office. Sincerely, Cedric Maradiaga NP

## 2019-02-05 ENCOUNTER — TELEPHONE (OUTPATIENT)
Dept: FAMILY MEDICINE CLINIC | Age: 64
End: 2019-02-05

## 2019-02-05 NOTE — TELEPHONE ENCOUNTER
Patient states she is on last day of Augmentin , finished nose spray and is still coughing, runny nose, can't sleep at night. Wants to make sure she does not have bronchitis.

## 2019-02-05 NOTE — TELEPHONE ENCOUNTER
Patient would like an appointment with Warden Mcmanus soon for nasal congestion she can be reached @ [phone] or [phone]

## 2019-02-06 ENCOUNTER — OFFICE VISIT (OUTPATIENT)
Dept: FAMILY MEDICINE CLINIC | Age: 64
End: 2019-02-06

## 2019-02-06 ENCOUNTER — TELEPHONE (OUTPATIENT)
Dept: FAMILY MEDICINE CLINIC | Age: 64
End: 2019-02-06

## 2019-02-06 VITALS
HEART RATE: 87 BPM | SYSTOLIC BLOOD PRESSURE: 147 MMHG | DIASTOLIC BLOOD PRESSURE: 82 MMHG | HEIGHT: 62 IN | BODY MASS INDEX: 39.01 KG/M2 | RESPIRATION RATE: 16 BRPM | OXYGEN SATURATION: 96 % | WEIGHT: 212 LBS | TEMPERATURE: 98.7 F

## 2019-02-06 DIAGNOSIS — R05.9 COUGH: ICD-10-CM

## 2019-02-06 DIAGNOSIS — H61.21 IMPACTED CERUMEN OF RIGHT EAR: ICD-10-CM

## 2019-02-06 DIAGNOSIS — J40 BRONCHITIS: Primary | ICD-10-CM

## 2019-02-06 LAB
GRAN# POC: NORMAL K/UL
GRAN% POC: NORMAL %
HCT VFR BLD CALC: NORMAL %
HGB BLD-MCNC: NORMAL G/DL
LY# POC: NORMAL K/UL
LY% POC: NORMAL %
MCH RBC QN: NORMAL PG
MCHC RBC-ENTMCNC: NORMAL G/DL
MCV RBC: NORMAL FL
MID #, POC: NORMAL 10^3/UL
MID% POC: NORMAL %
PLATELET # BLD: NORMAL K/UL
RBC # BLD: NORMAL M/UL
WBC # BLD: NORMAL K/UL

## 2019-02-06 RX ORDER — ALBUTEROL SULFATE 90 UG/1
2 AEROSOL, METERED RESPIRATORY (INHALATION)
Qty: 1 INHALER | Refills: 11 | Status: SHIPPED | OUTPATIENT
Start: 2019-02-06 | End: 2020-06-15 | Stop reason: ALTCHOICE

## 2019-02-06 RX ORDER — PROMETHAZINE HYDROCHLORIDE AND DEXTROMETHORPHAN HYDROBROMIDE 6.25; 15 MG/5ML; MG/5ML
5 SYRUP ORAL
Qty: 180 ML | Refills: 0 | Status: SHIPPED | OUTPATIENT
Start: 2019-02-06 | End: 2019-02-13

## 2019-02-06 RX ORDER — DOXYCYCLINE 100 MG/1
100 CAPSULE ORAL 2 TIMES DAILY
Qty: 20 CAP | Refills: 0 | Status: SHIPPED | OUTPATIENT
Start: 2019-02-06 | End: 2019-02-16

## 2019-02-06 RX ORDER — PREDNISONE 10 MG/1
10 TABLET ORAL 2 TIMES DAILY
Qty: 10 TAB | Refills: 0 | Status: SHIPPED | OUTPATIENT
Start: 2019-02-06 | End: 2019-02-11

## 2019-02-06 NOTE — PROGRESS NOTES
HISTORY OF PRESENT ILLNESS  Aaron Mcneal is a 66 y.o. female. HPI  Patient comes in today for cough  Seen by Richmond Colunga on 1/24/19. Dx with viral URI, again on 1/28/19. Dx with sinusitis. Given augmentin, today is last day of dosing. She is not better. Complains of headache, ear ache, nasal congestion, cough. Chest pain with coughing and breathing, productive with yellow mucus. No wheezing. No appetite. No fever, chills. Some nausea and vomiting. 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 States she may have gotten better for couple days, then symptoms restarted.   Allergies   Allergen Reactions    Codeine Nausea and Vomiting    Keflex [Cephalexin] Nausea and Vomiting    Lipitor [Atorvastatin] Myalgia    Oxycodone Nausea and Vomiting    Percocet [Oxycodone-Acetaminophen] Nausea and Vomiting    Tessalon [Benzonatate] Hives and Itching    Tramadol Other (comments) and Nausea and Vomiting     dizziness    Vicodin [Hydrocodone-Acetaminophen] Nausea and Vomiting       Past Medical History:   Diagnosis Date    DJD (degenerative joint disease) back 4/10/2010    and neck    DM (diabetes mellitus) ([facility] [address] 4/10/2010    Elevated cholesterol 4/10/2010    HTN (hypertension) 4/10/2010    Hypokalemia 4/10/2010    Unspecified vitamin D deficiency        Past Surgical History:   Procedure Laterality Date    HX HYSTERECTOMY  6/14/04    [address]    HX MOHS PROCEDURES  11/10/2015    right shoulder    HX ORTHOPAEDIC  4/29/13    LEFT ROTATOR CUFF REPAIR      HX ORTHOPAEDIC  06/20/13    shoulder manipulation for frozen shoulder    HX ORTHOPAEDIC  8/5/2014    pt states she had injection by ortho in right groin    HX ORTHOPAEDIC  2/16/2016    right shoulder manipulation    HX ORTHOPAEDIC  11/10/2015    right rotator cuff    HX TONSILLECTOMY      age 31    OH COLONOSCOPY FLX DX W/COLLJ SPEC WHEN PFRMD  03/28/2008    Dr Debra López       Social History     Socioeconomic History    Marital status:      Spouse name: Not on file    Number of children: Not on file    Years of education: Not on file    Highest education level: Not on file   Social Needs    Financial resource strain: Not on file    Food insecurity - worry: Not on file    Food insecurity - inability: Not on file    Transportation needs - medical: Not on file   [employer] needs - non-medical: Not on file   Occupational History    Not on file   Tobacco Use    Smoking status: Never Smoker    Smokeless tobacco: Never Used   Substance and Sexual Activity    Alcohol use: No     Alcohol/week: 0.0 oz    Drug use: No    Sexual activity: Not Currently   Other Topics Concern    Not on file   Social History Narrative    Lives in [address] [place] [address] and her mother is with her in the winter. Has 2 grown sons. 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  Likes to walk and dancing. Family History   Problem Relation Age of Onset    Hypertension Mother     Diabetes Mother     Kidney Disease Mother     Heart Attack Father     Bleeding Prob Father         taking coumadin    Bipolar Disorder Sister     Hypertension Sister     No Known Problems Sister        Current Outpatient Medications   Medication Sig    valsartan-hydroCHLOROthiazide (DIOVAN-HCT) 320-25 mg per tablet TAKE 1 TABLET BY MOUTH ONCE EVERY DAY    acetaminophen (TYLENOL) 325 mg tablet Take  by mouth every four (4) hours as needed for Pain.  amoxicillin-clavulanate (AUGMENTIN) 875-125 mg per tablet Take 1 Tab by mouth two (2) times a day for 10 days.  gabapentin (NEURONTIN) 300 mg capsule Take 300 mg by mouth nightly.  ALPRAZolam (XANAX) 0.5 mg tablet TAKE 1 TABLET BY MOUTH ONCE DAILY EVERY NIGHT AT BEDTIME AS NEEDED FOR SLEEP    glucose blood VI test strips (ACCU-CHEK SMARTVIEW TEST STRIP) strip Use to check blood sugar twice daily and prn.     glipiZIDE SR (GLUCOTROL XL) 5 mg CR tablet TAKE 2 TABLETS BY MOUTH ONCE DAILY    JANUMET XR 50-1,000 mg TM24 Take 1 Tab by mouth two (2) times daily (with meals).  ibuprofen (MOTRIN IB) 200 mg tablet Take 400 mg by mouth every six (6) hours as needed for Pain.  VITAMIN B COMPLEX PO Take 1 Tab by mouth daily.  Lancets (ACCU-CHEK FASTCLIX) [facility] [place] to obtain blood sample for glucose testing daily and prn.  valsartan-hydrochlorothiazide (DIOVAN-HCT) 320-25 mg per tablet Take 1 Tab by mouth daily.  aspirin 81 mg tablet Take 81 mg by mouth daily.  LORazepam (ATIVAN) 1 mg tablet Take one pill 2 hours prior to injection, may take one more pill 30 minutes prior to injection if needed    fluticasone (FLONASE) 50 mcg/actuation nasal spray 2 sprays in each nostril once daily as needed for nasal congestion. No current facility-administered medications for this visit. Review of Systems   Constitutional: Negative for chills, fever and malaise/fatigue. HENT: Positive for congestion, ear pain and hearing loss. Negative for sinus pain and sore throat. Respiratory: Positive for cough, sputum production and shortness of breath. Negative for wheezing. Cardiovascular: Positive for chest pain (soreness from coughing around right johnnie of ribs). Negative for palpitations. Gastrointestinal: Positive for nausea and vomiting. Negative for diarrhea. Genitourinary: Negative for dysuria, frequency and urgency. Musculoskeletal: Negative for myalgias. Neurological: Positive for headaches. Negative for dizziness. Endo/Heme/Allergies: Negative for environmental allergies. Vitals:    02/06/19 1408   BP: 147/82   Pulse: 87   Resp: 16   Temp: 98.7 °F (37.1 °C)   TempSrc: Oral   SpO2: 96%   Weight: 212 lb (96.2 kg)   Height: 5' 2\" (1.575 m)     Physical Exam   Constitutional: She is oriented to person, place, and time. Vital signs are normal. She appears well-developed and well-nourished. She is cooperative. She appears ill. HENT:   Right Ear: Hearing, tympanic membrane and ear canal normal. There is drainage (cerumen impaction).    Left Ear: Hearing, tympanic membrane, external ear and ear canal normal.   Nose: Mucosal edema and rhinorrhea present. Right sinus exhibits no maxillary sinus tenderness and no frontal sinus tenderness. Left sinus exhibits no maxillary sinus tenderness and no frontal sinus tenderness. Mouth/Throat: Uvula is midline, oropharynx is clear and moist and mucous membranes are normal. Mucous membranes are not pale and not dry. No oropharyngeal exudate, posterior oropharyngeal edema or posterior oropharyngeal erythema. Cardiovascular: Normal rate, regular rhythm, S1 normal, S2 normal and normal heart sounds. Pulmonary/Chest: Effort normal. She has decreased breath sounds in the right lower field. She has wheezes. She has rhonchi (coarse rhonchi, does not clear with cough). She has no rales. Congested cough, noted dyspnea after coughing spells   Lymphadenopathy:        Head (right side): No submental, no submandibular, no tonsillar, no preauricular and no posterior auricular adenopathy present. Head (left side): No submental, no submandibular, no tonsillar, no preauricular and no posterior auricular adenopathy present. She has no cervical adenopathy. Right: No supraclavicular adenopathy present. Left: No supraclavicular adenopathy present. Neurological: She is alert and oriented to person, place, and time. Skin: Skin is warm and dry. Psychiatric: Thought content normal.   Vitals reviewed. ASSESSMENT and PLAN    ICD-10-CM ICD-9-CM    1. Bronchitis J40 490 XR CHEST PA LAT      AMB POC COMPLETE CBC,AUTOMATED ENTER      METABOLIC PANEL, BASIC      doxycycline (VIBRAMYCIN) 100 mg capsule      predniSONE (DELTASONE) 10 mg tablet      promethazine-dextromethorphan (PROMETHAZINE-DM) 6.25-15 mg/5 mL syrup      albuterol (PROVENTIL HFA, VENTOLIN HFA, PROAIR HFA) 90 mcg/actuation inhaler   2. Impacted cerumen of right ear H61.21 380.4 REMOVE IMPACTED EAR WAX   3.  Cough R05 786.2 XR CHEST PA LAT AMB POC COMPLETE CBC,AUTOMATED ENTER      METABOLIC PANEL, BASIC     Encounter Diagnoses   Name Primary?  Bronchitis Yes    Impacted cerumen of right ear     Cough      Orders Placed This Encounter    REMOVE IMPACTED EAR WAX    XR CHEST PA LAT    METABOLIC PANEL, BASIC    AMB POC COMPLETE CBC,AUTOMATED ENTER    doxycycline (VIBRAMYCIN) 100 mg capsule    predniSONE (DELTASONE) 10 mg tablet    promethazine-dextromethorphan (PROMETHAZINE-DM) 6.25-15 mg/5 mL syrup    albuterol (PROVENTIL HFA, VENTOLIN HFA, PROAIR HFA) 90 mcg/actuation inhaler     Diagnoses and all orders for this visit:    1. Bronchitis - patient afebrile, not tachycardic, WBC normal, CXR negative for pneumonia. Given doxy, steroid taper, DuoNeb given in office with relief of tightness and wheezing. Albuterol for home. If no improvement in 3-4 days or worsening of symptoms, patient to go to ED  -     XR CHEST PA LAT; Future  -     AMB POC COMPLETE CBC,AUTOMATED ENTER  -     METABOLIC PANEL, BASIC  -     doxycycline (VIBRAMYCIN) 100 mg capsule; Take 1 Cap by mouth two (2) times a day for 10 days. -     predniSONE (DELTASONE) 10 mg tablet; Take 10 mg by mouth two (2) times a day for 5 days. -     promethazine-dextromethorphan (PROMETHAZINE-DM) 6.25-15 mg/5 mL syrup; Take 5 mL by mouth every four (4) hours as needed for Cough for up to 7 days. -     albuterol (PROVENTIL HFA, VENTOLIN HFA, PROAIR HFA) 90 mcg/actuation inhaler; Take 2 Puffs by inhalation every four (4) hours as needed for Wheezing or Shortness of Breath. 2. Impacted cerumen of right ear - after cerumen plug removed, patient states headache, ear pain and pressure resolved. -     REMOVE IMPACTED EAR WAX    3. Cough  -     XR CHEST PA LAT;  Future  -     AMB POC COMPLETE CBC,AUTOMATED ENTER  -     METABOLIC PANEL, BASIC      Follow-up Disposition:  Return if symptoms worsen or fail to improve.  lab results and schedule of future lab studies reviewed with patient  radiology results and schedule of future radiology studies reviewed with patient    I have reviewed the patient's allergies and made any necessary changes. Medical, procedural, social and family histories have been reviewed and updated as medically indicated. I have reconciled and/or revised patient medications in the EMR. I have discussed each diagnosis listed in this note with Abdoul Lopez and/or their family. I have discussed treatment options and the risk/benefit analysis of those options, including safe use of medications and possible medication side effects. Through the use of shared decision making we have agreed to the above plan. The patient has received an after-visit summary and questions were answered concerning future plans. Alesha Salinas, FNP-C    This note will not be viewable in [employer].

## 2019-02-06 NOTE — LETTER
NOTIFICATION RETURN TO WORK 
 
2/6/2019 3:36 PM 
 
Ms. Vaishnavi Horowitz [address] [phone] To Whom It May Concern: 
 
Vaishnavi Horowitz is currently under the care of [facility]. She will return to work on: 2/7/19 If there are questions or concerns please have the patient contact our office. Sincerely, Kandice Fernandez NP

## 2019-02-06 NOTE — PATIENT INSTRUCTIONS
Bronchitis: Care Instructions  Your Care Instructions    Bronchitis is inflammation of the bronchial tubes, which carry air to the lungs. The tubes swell and produce mucus, or phlegm. The mucus and inflamed bronchial tubes make you cough. You may have trouble breathing. Most cases of bronchitis are caused by viruses like those that cause colds. Antibiotics usually do not help and they may be harmful. Bronchitis usually develops rapidly and lasts about 2 to 3 weeks in otherwise healthy people. Follow-up care is a key part of your treatment and safety. Be sure to make and go to all appointments, and call your doctor if you are having problems. It's also a good idea to know your test results and keep a list of the medicines you take. How can you care for yourself at home? · Take all medicines exactly as prescribed. Call your doctor if you think you are having a problem with your medicine. · Get some extra rest.  · Take an over-the-counter pain medicine, such as acetaminophen (Tylenol), ibuprofen (Advil, Motrin), or naproxen (Aleve) to reduce fever and relieve body aches. Read and follow all instructions on the label. · Do not take two or more pain medicines at the same time unless the doctor told you to. Many pain medicines have acetaminophen, which is Tylenol. Too much acetaminophen (Tylenol) can be harmful. · Take an over-the-counter cough medicine that contains dextromethorphan to help quiet a dry, hacking cough so that you can sleep. Avoid cough medicines that have more than one active ingredient. Read and follow all instructions on the label. · Breathe moist air from a humidifier, hot shower, or sink filled with hot water. The heat and moisture will thin mucus so you can cough it out. · Do not smoke. Smoking can make bronchitis worse. If you need help quitting, talk to your doctor about stop-smoking programs and medicines. These can increase your chances of quitting for good.   When should you call for help? Call 911 anytime you think you may need emergency care. For example, call if:    · You have severe trouble breathing.    Call your doctor now or seek immediate medical care if:    · You have new or worse trouble breathing.     · You cough up dark brown or bloody mucus (sputum).     · You have a new or higher fever.     · You have a new rash.    Watch closely for changes in your health, and be sure to contact your doctor if:    · You cough more deeply or more often, especially if you notice more mucus or a change in the color of your mucus.     · You are not getting better as expected. Where can you learn more? Go to http://deny-cuate.info/. Enter H333 in the search box to learn more about \"Bronchitis: Care Instructions. \"  Current as of: September 5, 2018  Content Version: 11.9  © 3839-0020 [employer]. Care instructions adapted under license by [employer] (which disclaims liability or warranty for this information). If you have questions about a medical condition or this instruction, always ask your healthcare professional. [address] any warranty or liability for your use of this information.

## 2019-02-06 NOTE — PROGRESS NOTES
Chief Complaint   Patient presents with    Cough     cough, congestion, headache, rib pain x3 weeks     1. Have you been to the ER, urgent care clinic since your last visit? Hospitalized since your last visit?no    2. Have you seen or consulted any other health care providers outside of the [address] [place] [address] since your last visit? Include any pap smears or colon screening.  no

## 2019-02-07 LAB
BUN SERPL-MCNC: 8 MG/DL (ref 8–27)
BUN/CREAT SERPL: 11 (ref 12–28)
CALCIUM SERPL-MCNC: 9.6 MG/DL (ref 8.7–10.3)
CHLORIDE SERPL-SCNC: 98 MMOL/L (ref 96–106)
CO2 SERPL-SCNC: 27 MMOL/L (ref 20–29)
CREAT SERPL-MCNC: 0.72 MG/DL (ref 0.57–1)
GLUCOSE SERPL-MCNC: 122 MG/DL (ref 65–99)
POTASSIUM SERPL-SCNC: 3.9 MMOL/L (ref 3.5–5.2)
SODIUM SERPL-SCNC: 139 MMOL/L (ref 134–144)

## 2019-04-11 DIAGNOSIS — E11.9 TYPE 2 DIABETES MELLITUS WITHOUT COMPLICATION, UNSPECIFIED WHETHER LONG TERM INSULIN USE (HCC): ICD-10-CM

## 2019-04-12 RX ORDER — SITAGLIPTIN AND METFORMIN HYDROCHLORIDE 50; 1000 MG/1; MG/1
TABLET, FILM COATED, EXTENDED RELEASE ORAL
Qty: 60 TAB | Refills: 0 | Status: SHIPPED | OUTPATIENT
Start: 2019-04-12 | End: 2019-05-17 | Stop reason: SDUPTHER

## 2019-04-12 NOTE — TELEPHONE ENCOUNTER
Request for Janumet 50/1000mg twice a day. Last office visit 2/6/19, next office visit 4/19/19. Refill pended for provider approval.  Patients current MD out of the office.

## 2019-04-19 ENCOUNTER — OFFICE VISIT (OUTPATIENT)
Dept: FAMILY MEDICINE CLINIC | Age: 64
End: 2019-04-19

## 2019-04-19 VITALS
BODY MASS INDEX: 39.31 KG/M2 | HEIGHT: 62 IN | HEART RATE: 77 BPM | RESPIRATION RATE: 16 BRPM | TEMPERATURE: 96.9 F | WEIGHT: 213.6 LBS | SYSTOLIC BLOOD PRESSURE: 126 MMHG | OXYGEN SATURATION: 98 % | DIASTOLIC BLOOD PRESSURE: 68 MMHG

## 2019-04-19 DIAGNOSIS — Z51.81 ENCOUNTER FOR MEDICATION MONITORING: ICD-10-CM

## 2019-04-19 DIAGNOSIS — E78.2 MIXED HYPERLIPIDEMIA: ICD-10-CM

## 2019-04-19 DIAGNOSIS — I10 ESSENTIAL HYPERTENSION: Primary | ICD-10-CM

## 2019-04-19 DIAGNOSIS — E66.9 NON MORBID OBESITY: ICD-10-CM

## 2019-04-19 DIAGNOSIS — E11.9 TYPE 2 DIABETES MELLITUS WITHOUT COMPLICATION, UNSPECIFIED WHETHER LONG TERM INSULIN USE (HCC): ICD-10-CM

## 2019-04-19 LAB
BILIRUB UR QL STRIP: NEGATIVE
GLUCOSE POC: 99 MG/DL
GLUCOSE UR-MCNC: NEGATIVE MG/DL
HBA1C MFR BLD HPLC: 7.7 %
KETONES P FAST UR STRIP-MCNC: NEGATIVE MG/DL
PH UR STRIP: 5.5 [PH] (ref 4.6–8)
PROT UR QL STRIP: NEGATIVE
SP GR UR STRIP: 1 (ref 1–1.03)
UA UROBILINOGEN AMB POC: NORMAL (ref 0.2–1)
URINALYSIS CLARITY POC: CLEAR
URINALYSIS COLOR POC: NORMAL
URINE BLOOD POC: NEGATIVE
URINE LEUKOCYTES POC: NORMAL
URINE NITRITES POC: NEGATIVE

## 2019-04-19 RX ORDER — LANOLIN ALCOHOL/MO/W.PET/CERES
1000 CREAM (GRAM) TOPICAL DAILY
COMMUNITY

## 2019-04-19 NOTE — PROGRESS NOTES
HISTORY OF PRESENT ILLNESS  Eleazar Mukherjee is a 66 y.o. female. HPI   Follow up on chronic medical problems. Overall feeling well. Has been seeing ortho and pain management for there lower back pain. She is doing PT still        HTN follow up:  Compliant w/ meds, low salt diet, and exercise. No home bp monitoring. No swelling, headache or dizziness. No chest pain, SOB, palpitations. Otherwise feeling well since the last visit. DM type II follow up:  Compliant w/ meds, diabetic diet, and exercise. Obtains home glucose monitoring averaging 100-140. She has had 2 FLAVIO and a couple of prednisone dose pack for her back since her last a1c level. Checks BS daily on most days and prn. Pt does not have BS log at visit today. No Rf needed for today. Denies any tingling sensation, polyuria and polydipsia. No blurred vision. No significant weight changes. Feeling well since last OV. Hypercholesterolemia follow up:  Compliant w/ low fat, low cholesterol diet. Exercising some. Off of Lipitor d/t muscle aching. Patient Active Problem List   Diagnosis Code    DJD (degenerative joint disease) back M19.90    Hypokalemia E87.6    History of hysterectomy, supracervical Z90.711    Mixed hyperlipidemia E78.2    Rotator cuff tear M75.100    Unspecified vitamin D deficiency E55.9    Essential hypertension I10    Type 2 diabetes mellitus without complication ([facility] [address] X08.8    Other osteoarthritis of spine, lumbar region M47.896    Encounter for medication monitoring Z51.81    Rotator cuff arthropathy M12.819    S/P rotator cuff repair Z98.890    Severe obesity (BMI 35.0-39. 9) with comorbidity (HCC) E66.01       Current Outpatient Medications   Medication Sig Dispense Refill    JANUMET XR 50-1,000 mg TM24 TAKE 1 TABLET BY MOUTH TWICE DAILY WITH MEALS 60 Tab 0    albuterol (PROVENTIL HFA, VENTOLIN HFA, PROAIR HFA) 90 mcg/actuation inhaler Take 2 Puffs by inhalation every four (4) hours as needed for Wheezing or Shortness of Breath. 1 Inhaler 11    valsartan-hydroCHLOROthiazide (DIOVAN-HCT) 320-25 mg per tablet TAKE 1 TABLET BY MOUTH ONCE EVERY DAY 30 Tab 6    acetaminophen (TYLENOL) 325 mg tablet Take  by mouth every four (4) hours as needed for Pain.  gabapentin (NEURONTIN) 300 mg capsule Take 300 mg by mouth nightly.  LORazepam (ATIVAN) 1 mg tablet Take one pill 2 hours prior to injection, may take one more pill 30 minutes prior to injection if needed      fluticasone (FLONASE) 50 mcg/actuation nasal spray 2 sprays in each nostril once daily as needed for nasal congestion. 1 Bottle 0    ALPRAZolam (XANAX) 0.5 mg tablet TAKE 1 TABLET BY MOUTH ONCE DAILY EVERY NIGHT AT BEDTIME AS NEEDED FOR SLEEP 30 Tab 3    glucose blood VI test strips (ACCU-CHEK SMARTVIEW TEST STRIP) strip Use to check blood sugar twice daily and prn. 200 Strip 3    glipiZIDE SR (GLUCOTROL XL) 5 mg CR tablet TAKE 2 TABLETS BY MOUTH ONCE DAILY 60 Tab 11    ibuprofen (MOTRIN IB) 200 mg tablet Take 400 mg by mouth every six (6) hours as needed for Pain.  VITAMIN B COMPLEX PO Take 1 Tab by mouth daily.  Lancets (ACCU-CHEK FASTCLIX) misc Use to obtain blood sample for glucose testing daily and prn. 100 Each 3    valsartan-hydrochlorothiazide (DIOVAN-HCT) 320-25 mg per tablet Take 1 Tab by mouth daily. 30 Tab 11    aspirin 81 mg tablet Take 81 mg by mouth daily.          Allergies   Allergen Reactions    Codeine Nausea and Vomiting    Keflex [Cephalexin] Nausea and Vomiting    Lipitor [Atorvastatin] Myalgia    Oxycodone Nausea and Vomiting    Percocet [Oxycodone-Acetaminophen] Nausea and Vomiting    Tessalon [Benzonatate] Hives and Itching    Tramadol Other (comments) and Nausea and Vomiting     dizziness    Vicodin [Hydrocodone-Acetaminophen] Nausea and Vomiting         Past Medical History:   Diagnosis Date    DJD (degenerative joint disease) back 4/10/2010    and neck    DM (diabetes mellitus) ([facility] [address] 4/10/2010    Elevated cholesterol 4/10/2010    HTN (hypertension) 4/10/2010    Hypokalemia 4/10/2010    Unspecified vitamin D deficiency          Past Surgical History:   Procedure Laterality Date    HX HYSTERECTOMY  6/14/04    [address]    HX MOHS PROCEDURES  11/10/2015    right shoulder    HX ORTHOPAEDIC  4/29/13    LEFT ROTATOR CUFF REPAIR      HX ORTHOPAEDIC  06/20/13    shoulder manipulation for frozen shoulder    HX ORTHOPAEDIC  8/5/2014    pt states she had injection by ortho in right groin    HX ORTHOPAEDIC  2/16/2016    right shoulder manipulation    HX ORTHOPAEDIC  11/10/2015    right rotator cuff    HX TONSILLECTOMY      age 31    NC COLONOSCOPY FLX DX W/COLLJ SPEC WHEN PFRMD  03/28/2008    Dr Patricia Jiang         Family History   Problem Relation Age of Onset    Hypertension Mother     Diabetes Mother     Kidney Disease Mother     Heart Attack Father     Bleeding Prob Father         taking coumadin    Bipolar Disorder Sister     Hypertension Sister     No Known Problems Sister        Social History     Tobacco Use    Smoking status: Never Smoker    Smokeless tobacco: Never Used   Substance Use Topics    Alcohol use: No     Alcohol/week: 0.0 oz        Lab Results   Component Value Date/Time    WBC 7.6 08/02/2017 09:55 AM    HGB 12.9 08/02/2017 09:55 AM    HCT 38.6 08/02/2017 09:55 AM    PLATELET 775 69/73/2445 09:55 AM    MCV 80 08/02/2017 09:55 AM     Lab Results   Component Value Date/Time    Cholesterol, total 189 11/16/2018 09:57 AM    HDL Cholesterol 54 11/16/2018 09:57 AM    LDL, calculated 123 (H) 11/16/2018 09:57 AM    Triglyceride 59 11/16/2018 09:57 AM    CHOL/HDL Ratio 3.9 05/04/2010 04:50 PM     Lab Results   Component Value Date/Time    TSH 1.020 08/02/2017 09:55 AM      Lab Results   Component Value Date/Time    Sodium 139 02/06/2019 03:51 PM    Potassium 3.9 02/06/2019 03:51 PM    Chloride 98 02/06/2019 03:51 PM    CO2 27 02/06/2019 03:51 PM    Anion gap 11 06/12/2014 09:23 PM Glucose 122 (H) 02/06/2019 03:51 PM    BUN 8 02/06/2019 03:51 PM    Creatinine 0.72 02/06/2019 03:51 PM    BUN/Creatinine ratio 11 (L) 02/06/2019 03:51 PM    GFR est  02/06/2019 03:51 PM    GFR est non-AA 91 02/06/2019 03:51 PM    Calcium 9.6 02/06/2019 03:51 PM    Bilirubin, total 0.5 11/16/2018 09:57 AM    ALT (SGPT) 19 11/16/2018 09:57 AM    AST (SGOT) 23 11/16/2018 09:57 AM    Alk. phosphatase 81 11/16/2018 09:57 AM    Protein, total 7.1 11/16/2018 09:57 AM    Albumin 4.5 11/16/2018 09:57 AM    Globulin 3.9 06/12/2014 09:23 PM    A-G Ratio 1.7 11/16/2018 09:57 AM      Lab Results   Component Value Date/Time    Hemoglobin A1c 6.9 (H) 02/20/2015 09:57 AM    Hemoglobin A1c (POC) 6.2 11/16/2018 09:57 AM         Review of Systems   Constitutional: Negative for malaise/fatigue. HENT: Negative for congestion. Eyes: Negative for blurred vision. Respiratory: Negative for cough and shortness of breath. Cardiovascular: Negative for chest pain, palpitations and leg swelling. Gastrointestinal: Negative for abdominal pain, constipation and heartburn. Genitourinary: Negative for dysuria, frequency and urgency. Musculoskeletal: Negative for back pain and joint pain. Neurological: Negative for dizziness, tingling and headaches. Endo/Heme/Allergies: Negative for environmental allergies. Psychiatric/Behavioral: Negative for depression. The patient does not have insomnia. Physical Exam   Constitutional: She appears well-developed and well-nourished. Visit Vitals  /68 (BP 1 Location: Right arm, BP Patient Position: Sitting)   Pulse 77   Temp 96.9 °F (36.1 °C) (Oral)   Resp 16   Ht 5' 2\" (1.575 m)   Wt 213 lb 9.6 oz (96.9 kg)   LMP 06/14/2004   SpO2 98%   PF 98 L/min   BMI 39.07 kg/m²       HENT:   Right Ear: Tympanic membrane and ear canal normal.   Left Ear: Tympanic membrane and ear canal normal.   Nose: No mucosal edema or rhinorrhea.    Mouth/Throat: Oropharynx is clear and moist and mucous membranes are normal.   Neck: Normal range of motion. Neck supple. No thyromegaly present. Cardiovascular: Normal rate and regular rhythm. No murmur heard. Pulmonary/Chest: Effort normal and breath sounds normal.   Abdominal: Soft. Bowel sounds are normal. There is no tenderness. Musculoskeletal: Normal range of motion. She exhibits no edema. Foot exam done . Normal sensation to PP, LT and vibration. Sensation intact to microfilament testing. Pulses intact. No swelling. No skin lesions or sores noted. No tinea present. Lymphadenopathy:     She has no cervical adenopathy. Skin: Skin is warm and dry. Psychiatric: She has a normal mood and affect. Nursing note and vitals reviewed. ASSESSMENT and PLAN  Diagnoses and all orders for this visit:    1. Essential hypertension  Stable at goal.      2. Type 2 diabetes mellitus without complication, unspecified whether long term insulin use (HCC)  a1c level is up to 7.7%. She thinks that this is d/t the steroids that she has had over the past few months. Discussed diet. Foot exam completed. -     AMB POC HEMOGLOBIN A1C  -     AMB POC GLUCOSE, QUANTITATIVE, BLOOD  -     MICROALBUMIN, UR, RAND W/ MICROALB/CREAT RATIO  -     AMB POC URINALYSIS DIP STICK AUTO W/O MICRO    3. Mixed hyperlipidemia  -     LIPID PANEL    4. Encounter for medication monitoring  -     METABOLIC PANEL, BASIC    5. Non morbid obesity  I have reviewed/discussed the above normal BMI with the patient. I have recommended the following interventions: dietary management education, guidance, and counseling . Follow-up and Dispositions    · Return in about 3 months (around 7/19/2019).        reviewed diet, exercise and weight control  cardiovascular risk and specific lipid/LDL goals reviewed  reviewed medications and side effects in detail  specific diabetic recommendations: low cholesterol diet, weight control and daily exercise discussed, home glucose monitoring emphasized, all medications, side effects and compliance discussed carefully, foot care discussed and Podiatry visits discussed, annual eye examinations at Ophthalmology discussed and glycohemoglobin and other lab monitoring discussed     I have discussed diagnosis listed in this note with pt and/or family. I have discussed treatment plans and options and the risk/benefit analysis of those options, including safe use of medications and possible medication side effects. Through the use of shared decision making we have agreed to the above plan. The patient has received an after-visit summary and questions were answered concerning future plans and follow up. Advise pt of any urgent changes then to proceed to the ER.

## 2019-04-19 NOTE — PROGRESS NOTES
Chief Complaint   Patient presents with    Hypertension     follow up    Diabetes     follow up    Cholesterol Problem     follow up     A1C 11/16/2018    Mammogram 10/2/2018    Colonoscopy 4/25/2018 by Dr. Rachel Schneider- repeat in 10 years    Eye exam 8/29/2018 by Dr. Rigo Hendrickson. 1. Have you been to the ER, urgent care clinic since your last visit? Hospitalized since your last visit? no    2. Have you seen or consulted any other health care providers outside of the [address] [place] [address] since your last visit? Include any pap smears or colon screening.  no

## 2019-04-20 LAB
ALBUMIN/CREAT UR: 6.4 MG/G CREAT (ref 0–30)
BUN SERPL-MCNC: 15 MG/DL (ref 8–27)
BUN/CREAT SERPL: 21 (ref 12–28)
CALCIUM SERPL-MCNC: 9.2 MG/DL (ref 8.7–10.3)
CHLORIDE SERPL-SCNC: 98 MMOL/L (ref 96–106)
CHOLEST SERPL-MCNC: 188 MG/DL (ref 100–199)
CO2 SERPL-SCNC: 24 MMOL/L (ref 20–29)
CREAT SERPL-MCNC: 0.72 MG/DL (ref 0.57–1)
CREAT UR-MCNC: 48.5 MG/DL
GLUCOSE SERPL-MCNC: 97 MG/DL (ref 65–99)
HDLC SERPL-MCNC: 48 MG/DL
INTERPRETATION, 910389: NORMAL
LDLC SERPL CALC-MCNC: 124 MG/DL (ref 0–99)
Lab: NORMAL
MICROALBUMIN UR-MCNC: 3.1 UG/ML
POTASSIUM SERPL-SCNC: 3.9 MMOL/L (ref 3.5–5.2)
SODIUM SERPL-SCNC: 137 MMOL/L (ref 134–144)
TRIGL SERPL-MCNC: 81 MG/DL (ref 0–149)
VLDLC SERPL CALC-MCNC: 16 MG/DL (ref 5–40)

## 2019-04-27 DIAGNOSIS — F51.01 PRIMARY INSOMNIA: ICD-10-CM

## 2019-04-29 RX ORDER — ALPRAZOLAM 0.5 MG/1
TABLET ORAL
Qty: 30 TAB | Refills: 3 | OUTPATIENT
Start: 2019-04-29 | End: 2019-12-05 | Stop reason: SDUPTHER

## 2019-05-02 ENCOUNTER — DOCUMENTATION ONLY (OUTPATIENT)
Dept: FAMILY MEDICINE CLINIC | Age: 64
End: 2019-05-02

## 2019-05-17 DIAGNOSIS — E11.9 TYPE 2 DIABETES MELLITUS WITHOUT COMPLICATION, UNSPECIFIED WHETHER LONG TERM INSULIN USE (HCC): ICD-10-CM

## 2019-05-17 NOTE — TELEPHONE ENCOUNTER
Last visit:4/19/19  Next visit: 7/26/19  Previous refill 4/12/19(60+0R)    Requested Prescriptions     Pending Prescriptions Disp Refills    SITagliptin-metFORMIN (JANUMET XR) 50-1,000 mg TM24 60 Tab 0     Sig: TAKE 1 TABLET BY MOUTH TWICE DAILY WITH MEALS

## 2019-08-22 NOTE — TELEPHONE ENCOUNTER
----- Message from Corinna Renee sent at 8/22/2019 11:14 AM EDT -----  Regarding: Dr. Tony Wren refill  Contact: [phone]  Pt requesting refill for \"Glipticade\" er 5mg. Pt advised the pharmacy was unaware why the medicine was not approved. [facility] [address] [place] [address] is the best pharmacy. Best contact for pt [phone].

## 2019-08-23 ENCOUNTER — DOCUMENTATION ONLY (OUTPATIENT)
Dept: FAMILY MEDICINE CLINIC | Age: 64
End: 2019-08-23

## 2019-08-23 NOTE — PROGRESS NOTES
Prior Auth started today for Glipizide ER 5 mg 2 a day thru [employer] : KEY: [id number] sent to [phone]

## 2019-08-26 NOTE — PROGRESS NOTES
HISTORY OF PRESENT ILLNESS  David Ovalle is a 66 y.o. female. HPI   Follow up on chronic medical problems. Overall feeling well. HTN follow up:  Compliant w/ meds, low salt diet, and exercise. No home bp monitoring. No swelling, headache or dizziness. No chest pain, SOB, palpitations. Otherwise feeling well since the last visit. DM type II follow up:  Compliant w/ meds, diabetic diet, and exercise. She has been out of glipizide for the past week d/t insurance not covering the medication. Obtains home glucose monitoring averaging 100-140. Checks BS daily on most days and prn. Pt does not have BS log at visit today. No Rf needed for today. Denies any tingling sensation, polyuria and polydipsia. No blurred vision. No significant weight changes. Feeling well since last OV. Hypercholesterolemia follow up:  Compliant w/ low fat, low cholesterol diet. Exercising some. Off of Lipitor d/t muscle aching. Osteoarthritis:  Patient has osteoarthritis. Has joint aching a stiffness that comes and goes. Lower back pain is better after getting the FLAVIO. Symptoms onset: problem is longstanding. Rheumatological ROS: stable, mild-to-moderate joint symptoms intermittently, reasonably well controlled by PRN meds. Response to treatment plan: stable and intermittent. HM:    Microalbumin 4/19/2019  Mammogram 10/2/2018  Colonoscopy 4/25/2018 by Dr. Mathew Show- repeat in 10 years  Eye exam 8/29/2018 by Dr. Hobson Apley. Patient states she has an eye exam tomorrow, 8/28/2019.   Patient Active Problem List   Diagnosis Code    DJD (degenerative joint disease) back M19.90    Hypokalemia E87.6    History of hysterectomy, supracervical Z90.711    Mixed hyperlipidemia E78.2    Rotator cuff tear M75.100    Unspecified vitamin D deficiency E55.9    Essential hypertension I10    Type 2 diabetes mellitus without complication ([facility] [address] R73.9    Other osteoarthritis of spine, lumbar region M47.896    Encounter for medication monitoring Z51.81    Rotator cuff arthropathy M12.819    S/P rotator cuff repair Z98.890    Severe obesity (BMI 35.0-39. 9) with comorbidity (HCC) E66.01       Current Outpatient Medications   Medication Sig Dispense Refill    glipiZIDE SR (GLUCOTROL XL) 5 mg CR tablet TAKE 2 TABLETS BY MOUTH EVERY DAY 60 Tab 011    SITagliptin-metFORMIN (JANUMET XR) 50-1,000 mg TM24 TAKE 1 TABLET BY MOUTH TWICE DAILY WITH MEALS 60 Tab 11    ALPRAZolam (XANAX) 0.5 mg tablet TAKE 1 TABLET BY MOUTH EVERY NIGHT AS NEEDED FOR SLEEP 30 Tab 3    cyanocobalamin (VITAMIN B-12) 1,000 mcg tablet Take 1,000 mcg by mouth daily.  albuterol (PROVENTIL HFA, VENTOLIN HFA, PROAIR HFA) 90 mcg/actuation inhaler Take 2 Puffs by inhalation every four (4) hours as needed for Wheezing or Shortness of Breath. 1 Inhaler 11    acetaminophen (TYLENOL) 325 mg tablet Take  by mouth every four (4) hours as needed for Pain.  gabapentin (NEURONTIN) 300 mg capsule Take 300 mg by mouth nightly.  glucose blood VI test strips (ACCU-CHEK SMARTVIEW TEST STRIP) strip Use to check blood sugar twice daily and prn. 200 Strip 3    ibuprofen (MOTRIN IB) 200 mg tablet Take 400 mg by mouth every six (6) hours as needed for Pain.  Lancets (ACCU-CHEK FASTCLIX) misc Use to obtain blood sample for glucose testing daily and prn. 100 Each 3    valsartan-hydrochlorothiazide (DIOVAN-HCT) 320-25 mg per tablet Take 1 Tab by mouth daily. 30 Tab 11    aspirin 81 mg tablet Take 81 mg by mouth daily.          Allergies   Allergen Reactions    Latex Rash    Codeine Nausea and Vomiting    Keflex [Cephalexin] Nausea and Vomiting    Lipitor [Atorvastatin] Myalgia    Oxycodone Nausea and Vomiting    Percocet [Oxycodone-Acetaminophen] Nausea and Vomiting    Tessalon [Benzonatate] Hives and Itching    Tramadol Other (comments) and Nausea and Vomiting     dizziness    Vicodin [Hydrocodone-Acetaminophen] Nausea and Vomiting         Past Medical History:   Diagnosis Date    DJD (degenerative joint disease) back 4/10/2010    and neck    DM (diabetes mellitus) ([facility] [address] 4/10/2010    Elevated cholesterol 4/10/2010    HTN (hypertension) 4/10/2010    Hypokalemia 4/10/2010    Unspecified vitamin D deficiency          Past Surgical History:   Procedure Laterality Date    HX HYSTERECTOMY  6/14/04    [address]    HX MOHS PROCEDURES  11/10/2015    right shoulder    HX ORTHOPAEDIC  4/29/13    LEFT ROTATOR CUFF REPAIR      HX ORTHOPAEDIC  06/20/13    shoulder manipulation for frozen shoulder    HX ORTHOPAEDIC  8/5/2014    pt states she had injection by ortho in right groin    HX ORTHOPAEDIC  2/16/2016    right shoulder manipulation    HX ORTHOPAEDIC  11/10/2015    right rotator cuff    HX ORTHOPAEDIC  02/25/2019    steroid injection L3 L4    HX TONSILLECTOMY      age 33    [place] COLONOSCOPY FLX DX W/COLLJ SPEC WHEN PFRMD  03/28/2008    Dr Chris Chester         Family History   Problem Relation Age of Onset    Hypertension Mother     Diabetes Mother     Kidney Disease Mother     Heart Attack Father     Bleeding Prob Father         taking coumadin    Bipolar Disorder Sister     Hypertension Sister     No Known Problems Sister        Social History     Tobacco Use    Smoking status: Never Smoker    Smokeless tobacco: Never Used   Substance Use Topics    Alcohol use: No     Alcohol/week: 0.0 standard drinks        Lab Results   Component Value Date/Time    WBC 7.6 08/02/2017 09:55 AM    HGB 12.9 08/02/2017 09:55 AM    HCT 38.6 08/02/2017 09:55 AM    PLATELET 645 92/54/3040 09:55 AM    MCV 80 08/02/2017 09:55 AM     Lab Results   Component Value Date/Time    Cholesterol, total 188 04/19/2019 09:32 AM    HDL Cholesterol 48 04/19/2019 09:32 AM    LDL, calculated 124 (H) 04/19/2019 09:32 AM    Triglyceride 81 04/19/2019 09:32 AM    CHOL/HDL Ratio 3.9 05/04/2010 04:50 PM     Lab Results   Component Value Date/Time    TSH 1.020 08/02/2017 09:55 AM      Lab Results Component Value Date/Time    Sodium 137 04/19/2019 09:32 AM    Potassium 3.9 04/19/2019 09:32 AM    Chloride 98 04/19/2019 09:32 AM    CO2 24 04/19/2019 09:32 AM    Anion gap 11 06/12/2014 09:23 PM    Glucose 97 04/19/2019 09:32 AM    BUN 15 04/19/2019 09:32 AM    Creatinine 0.72 04/19/2019 09:32 AM    BUN/Creatinine ratio 21 04/19/2019 09:32 AM    GFR est  04/19/2019 09:32 AM    GFR est non-AA 91 04/19/2019 09:32 AM    Calcium 9.2 04/19/2019 09:32 AM    Bilirubin, total 0.5 11/16/2018 09:57 AM    ALT (SGPT) 19 11/16/2018 09:57 AM    AST (SGOT) 23 11/16/2018 09:57 AM    Alk. phosphatase 81 11/16/2018 09:57 AM    Protein, total 7.1 11/16/2018 09:57 AM    Albumin 4.5 11/16/2018 09:57 AM    Globulin 3.9 06/12/2014 09:23 PM    A-G Ratio 1.7 11/16/2018 09:57 AM      Lab Results   Component Value Date/Time    Hemoglobin A1c 6.9 (H) 02/20/2015 09:57 AM    Hemoglobin A1c (POC) 7.7 04/19/2019 09:32 AM         Review of Systems   Constitutional: Negative for malaise/fatigue. HENT: Negative for congestion. Eyes: Negative for blurred vision. Respiratory: Negative for cough and shortness of breath. Cardiovascular: Negative for chest pain, palpitations and leg swelling. Gastrointestinal: Negative for abdominal pain, constipation and heartburn. Genitourinary: Negative for dysuria, frequency and urgency. Musculoskeletal: Negative for back pain and joint pain. Neurological: Negative for dizziness, tingling and headaches. Endo/Heme/Allergies: Negative for environmental allergies. Psychiatric/Behavioral: Negative for depression. The patient does not have insomnia. Physical Exam   Constitutional: She appears well-developed and well-nourished.      Visit Vitals  /78 (BP 1 Location: Left arm, BP Patient Position: Sitting)   Pulse 81   Temp 97.3 °F (36.3 °C) (Oral)   Resp 16   Ht 5' 2\" (1.575 m)   Wt 206 lb (93.4 kg)   LMP 06/14/2004   SpO2 98%   BMI 37.68 kg/m²     HENT:   Right Ear: Tympanic membrane and ear canal normal.   Left Ear: Tympanic membrane and ear canal normal.   Nose: No mucosal edema or rhinorrhea. Mouth/Throat: Oropharynx is clear and moist and mucous membranes are normal.   Neck: Normal range of motion. Neck supple. No thyromegaly present. Cardiovascular: Normal rate and regular rhythm. No murmur heard. Pulmonary/Chest: Effort normal and breath sounds normal.   Abdominal: Soft. Bowel sounds are normal. There is no tenderness. Musculoskeletal: Normal range of motion. She exhibits no edema. Lymphadenopathy:     She has no cervical adenopathy. Skin: Skin is warm and dry. Psychiatric: She has a normal mood and affect. Nursing note and vitals reviewed. ASSESSMENT and PLAN  Diagnoses and all orders for this visit:    1. Essential hypertension  Discussed sodium restriction, high k rich diet, maintaining ideal body weight and regular exercise program such as daily walking 30 min perday 4-5 times per week, as physiologic means to achieve blood pressure control.  Medication compliance advised. 2. Type 2 diabetes mellitus without complication, unspecified whether long term insulin use (HCC)  a1c level is 6.3%  -     Change to glimepiride (AMARYL) 2 mg tablet; Take 1 Tab by mouth every morning. For blood sugar. This is in place of glipizide. -     AMB POC HEMOGLOBIN A1C  -     AMB POC GLUCOSE, QUANTITATIVE, BLOOD    3. Mixed hyperlipidemia  -     LIPID PANEL    4. Primary osteoarthritis involving multiple joints  oveall stable. We discussed regular exercise regimen. 5. Encounter for medication monitoring  -     METABOLIC PANEL, COMPREHENSIVE  -     CBC W/O DIFF    6. Non morbid obesity  I have reviewed/discussed the above normal BMI with the patient. I have recommended the following interventions: dietary management education, guidance, and counseling . 7.  Encounter for immunization  -     INFLUENZA VIRUS VAC QUAD,SPLIT,PRESV FREE SYRINGE IM  - PNEUMOCOCCAL POLYSACCHARIDE VACCINE, 23-VALENT, ADULT OR IMMUNOSUPPRESSED PT DOSE,  -     [place] IMMUNIZ ADMIN,1 SINGLE/COMB VAC/TOXOID      Follow-up and Dispositions    · Return in about 4 months (around 12/18/2019). reviewed diet, exercise and weight control  cardiovascular risk and specific lipid/LDL goals reviewed  reviewed medications and side effects in detail  specific diabetic recommendations: low cholesterol diet, weight control and daily exercise discussed, foot care discussed and Podiatry visits discussed, annual eye examinations at Ophthalmology discussed and glycohemoglobin and other lab monitoring discussed     I have discussed diagnosis listed in this note with pt and/or family. I have discussed treatment plans and options and the risk/benefit analysis of those options, including safe use of medications and possible medication side effects. Through the use of shared decision making we have agreed to the above plan. The patient has received an after-visit summary and questions were answered concerning future plans and follow up. Advise pt of any urgent changes then to proceed to the ER.

## 2019-08-27 ENCOUNTER — OFFICE VISIT (OUTPATIENT)
Dept: FAMILY MEDICINE CLINIC | Age: 64
End: 2019-08-27

## 2019-08-27 VITALS
SYSTOLIC BLOOD PRESSURE: 131 MMHG | RESPIRATION RATE: 16 BRPM | OXYGEN SATURATION: 98 % | DIASTOLIC BLOOD PRESSURE: 78 MMHG | HEART RATE: 81 BPM | TEMPERATURE: 97.3 F | WEIGHT: 206 LBS | HEIGHT: 62 IN | BODY MASS INDEX: 37.91 KG/M2

## 2019-08-27 DIAGNOSIS — I10 ESSENTIAL HYPERTENSION: Primary | ICD-10-CM

## 2019-08-27 DIAGNOSIS — E66.9 NON MORBID OBESITY: ICD-10-CM

## 2019-08-27 DIAGNOSIS — M15.9 PRIMARY OSTEOARTHRITIS INVOLVING MULTIPLE JOINTS: ICD-10-CM

## 2019-08-27 DIAGNOSIS — Z23 ENCOUNTER FOR IMMUNIZATION: ICD-10-CM

## 2019-08-27 DIAGNOSIS — E78.2 MIXED HYPERLIPIDEMIA: ICD-10-CM

## 2019-08-27 DIAGNOSIS — Z51.81 ENCOUNTER FOR MEDICATION MONITORING: ICD-10-CM

## 2019-08-27 DIAGNOSIS — E11.9 TYPE 2 DIABETES MELLITUS WITHOUT COMPLICATION, UNSPECIFIED WHETHER LONG TERM INSULIN USE (HCC): ICD-10-CM

## 2019-08-27 PROBLEM — E11.40 TYPE 2 DIABETES MELLITUS WITH DIABETIC NEUROPATHY (HCC): Status: ACTIVE | Noted: 2019-08-27

## 2019-08-27 LAB
GLUCOSE POC: 116 MG/DL
HBA1C MFR BLD HPLC: 6.3 %

## 2019-08-27 RX ORDER — GLIMEPIRIDE 2 MG/1
2 TABLET ORAL
Qty: 30 TAB | Refills: 5 | Status: SHIPPED | OUTPATIENT
Start: 2019-08-27 | End: 2019-08-28 | Stop reason: SDUPTHER

## 2019-08-27 RX ORDER — ACETAMINOPHEN 500 MG
500 TABLET ORAL
COMMUNITY

## 2019-08-27 NOTE — TELEPHONE ENCOUNTER
Patient states that she went to  her RX glimepiride (AMARYL) 2 mg tablet pharmacy told her that her insurance has denied it she says Sharda Damon will be faxing something over to [address] [place] [address] she can be reached @ [phone]

## 2019-08-27 NOTE — PROGRESS NOTES
Chief Complaint   Patient presents with    Hypertension     follow up    Cholesterol Problem     follow up    Diabetes     follow up     A1C 4/19/2019    Microalbumin 4/19/2019    Mammogram 10/2/2018    Colonoscopy 4/25/2018 by Dr. Jessica Gay- repeat in 10 years    Eye exam 8/29/2018 by Dr. Beti Galdamez. Patient states she has an eye exam tomorrow, 8/28/2019.    1. Have you been to the ER, urgent care clinic since your last visit? Hospitalized since your last visit? No    2. Have you seen or consulted any other health care providers outside of the [address] [place] [address] since your last visit? Include any pap smears or colon screening.  No

## 2019-08-27 NOTE — LETTER
NOTIFICATION RETURN TO WORK / SCHOOL 
 
8/27/2019 12:29 PM 
 
Ms. Meenakshi Ruiz [address] [place] [address] [address]. [phone] To Whom It May Concern: 
 
Meenakshi Ruiz is currently under the care of [facility]. She will return to work/school on: August 29, 2019. If there are questions or concerns please have the patient contact our office. Sincerely, Kameron Gonzalez MD

## 2019-08-27 NOTE — PROGRESS NOTES
Order placed for Pneumo 23, 0.5 ml & Flulaval 0.5 ml, per Verbal Order from dr. Ulises Diallo on 8/27/2019 due to need. Pneumo 23 given in right arm IM and Flulaval given in left arm , both IM, no reaction noted.

## 2019-08-28 LAB
ALBUMIN SERPL-MCNC: 4.4 G/DL (ref 3.6–4.8)
ALBUMIN/GLOB SERPL: 1.6 {RATIO} (ref 1.2–2.2)
ALP SERPL-CCNC: 84 IU/L (ref 39–117)
ALT SERPL-CCNC: 20 IU/L (ref 0–32)
AST SERPL-CCNC: 23 IU/L (ref 0–40)
BILIRUB SERPL-MCNC: 0.6 MG/DL (ref 0–1.2)
BUN SERPL-MCNC: 13 MG/DL (ref 8–27)
BUN/CREAT SERPL: 17 (ref 12–28)
CALCIUM SERPL-MCNC: 9.4 MG/DL (ref 8.7–10.3)
CHLORIDE SERPL-SCNC: 101 MMOL/L (ref 96–106)
CHOLEST SERPL-MCNC: 189 MG/DL (ref 100–199)
CO2 SERPL-SCNC: 26 MMOL/L (ref 20–29)
CREAT SERPL-MCNC: 0.75 MG/DL (ref 0.57–1)
ERYTHROCYTE [DISTWIDTH] IN BLOOD BY AUTOMATED COUNT: 15.9 % (ref 12.3–15.4)
GLOBULIN SER CALC-MCNC: 2.8 G/DL (ref 1.5–4.5)
GLUCOSE SERPL-MCNC: 109 MG/DL (ref 65–99)
HCT VFR BLD AUTO: 37.1 % (ref 34–46.6)
HDLC SERPL-MCNC: 50 MG/DL
HGB BLD-MCNC: 12.3 G/DL (ref 11.1–15.9)
INTERPRETATION, 910389: NORMAL
LDLC SERPL CALC-MCNC: 125 MG/DL (ref 0–99)
MCH RBC QN AUTO: 27 PG (ref 26.6–33)
MCHC RBC AUTO-ENTMCNC: 33.2 G/DL (ref 31.5–35.7)
MCV RBC AUTO: 82 FL (ref 79–97)
PLATELET # BLD AUTO: 250 X10E3/UL (ref 150–450)
POTASSIUM SERPL-SCNC: 3.9 MMOL/L (ref 3.5–5.2)
PROT SERPL-MCNC: 7.2 G/DL (ref 6–8.5)
RBC # BLD AUTO: 4.55 X10E6/UL (ref 3.77–5.28)
SODIUM SERPL-SCNC: 143 MMOL/L (ref 134–144)
TRIGL SERPL-MCNC: 70 MG/DL (ref 0–149)
VLDLC SERPL CALC-MCNC: 14 MG/DL (ref 5–40)
WBC # BLD AUTO: 5.5 X10E3/UL (ref 3.4–10.8)

## 2019-08-28 RX ORDER — ROSUVASTATIN CALCIUM 5 MG/1
5 TABLET, COATED ORAL
Qty: 30 TAB | Refills: 3 | Status: SHIPPED | OUTPATIENT
Start: 2019-08-28 | End: 2020-01-15

## 2019-08-28 RX ORDER — GLIMEPIRIDE 2 MG/1
2 TABLET ORAL
Qty: 90 TAB | Refills: 3 | Status: SHIPPED | OUTPATIENT
Start: 2019-08-28 | End: 2020-10-28

## 2019-08-28 NOTE — TELEPHONE ENCOUNTER
----- Message from Scot Lips sent at 8/28/2019  2:55 PM EDT -----  Regarding: DR Celio Hu / Edita Cueva Message/Vendor Calls      Pt is reporting a NEW PHARMACY:     [facility] on [address] contact number(s):  [phone]                  Scot Lips

## 2019-08-28 NOTE — TELEPHONE ENCOUNTER
Sent prescription to Replaced by [facility]

## 2019-08-28 NOTE — TELEPHONE ENCOUNTER
Glimepiride 2 mg , 90 day supply can be bought at [facility] for 10.00 , left message for patient to call, need to know what [facility] to send medication to.

## 2019-10-25 ENCOUNTER — HOSPITAL ENCOUNTER (OUTPATIENT)
Dept: MAMMOGRAPHY | Age: 64
Discharge: HOME | End: 2019-10-25
Attending: FAMILY MEDICINE
Payer: COMMERCIAL

## 2019-10-25 DIAGNOSIS — Z12.31 VISIT FOR SCREENING MAMMOGRAM: ICD-10-CM

## 2019-10-25 PROCEDURE — 77067 SCR MAMMO BI INCL CAD: CPT

## 2019-12-05 DIAGNOSIS — F51.01 PRIMARY INSOMNIA: ICD-10-CM

## 2019-12-06 RX ORDER — ALPRAZOLAM 0.5 MG/1
TABLET ORAL
Qty: 30 TAB | Refills: 1 | OUTPATIENT
Start: 2019-12-06 | End: 2020-03-27

## 2020-01-15 ENCOUNTER — OFFICE VISIT (OUTPATIENT)
Dept: FAMILY MEDICINE CLINIC | Age: 65
End: 2020-01-15

## 2020-01-15 VITALS
TEMPERATURE: 98.4 F | DIASTOLIC BLOOD PRESSURE: 67 MMHG | OXYGEN SATURATION: 97 % | RESPIRATION RATE: 16 BRPM | HEIGHT: 62 IN | SYSTOLIC BLOOD PRESSURE: 126 MMHG | WEIGHT: 200 LBS | HEART RATE: 72 BPM | BODY MASS INDEX: 36.8 KG/M2

## 2020-01-15 DIAGNOSIS — Z23 NEED FOR SHINGLES VACCINE: ICD-10-CM

## 2020-01-15 DIAGNOSIS — E78.2 MIXED HYPERLIPIDEMIA: ICD-10-CM

## 2020-01-15 DIAGNOSIS — E11.9 TYPE 2 DIABETES MELLITUS WITHOUT COMPLICATION, UNSPECIFIED WHETHER LONG TERM INSULIN USE (HCC): ICD-10-CM

## 2020-01-15 DIAGNOSIS — E66.9 NON MORBID OBESITY: ICD-10-CM

## 2020-01-15 DIAGNOSIS — M15.9 PRIMARY OSTEOARTHRITIS INVOLVING MULTIPLE JOINTS: ICD-10-CM

## 2020-01-15 DIAGNOSIS — M75.82 TENDINITIS OF LEFT ROTATOR CUFF: ICD-10-CM

## 2020-01-15 DIAGNOSIS — I10 ESSENTIAL HYPERTENSION: Primary | ICD-10-CM

## 2020-01-15 DIAGNOSIS — Z51.81 ENCOUNTER FOR MEDICATION MONITORING: ICD-10-CM

## 2020-01-15 LAB
GLUCOSE POC: 124 MG/DL
HBA1C MFR BLD HPLC: 6.3 %

## 2020-01-15 RX ORDER — PREDNISONE 10 MG/1
TABLET ORAL
Qty: 21 TAB | Refills: 0 | Status: SHIPPED | OUTPATIENT
Start: 2020-01-15 | End: 2020-01-15 | Stop reason: SDUPTHER

## 2020-01-15 RX ORDER — PREDNISONE 10 MG/1
TABLET ORAL
Qty: 21 TAB | Refills: 0 | Status: SHIPPED | OUTPATIENT
Start: 2020-01-15 | End: 2020-06-15 | Stop reason: ALTCHOICE

## 2020-01-15 NOTE — PROGRESS NOTES
HISTORY OF PRESENT ILLNESS  Eduardo Jonas is a 60 y.o. female. HPI   Follow up on chronic medical problems. C/o left shoulder pain for the past few weeks. Sx comes and goes. Radiates to the upper arm. Increase pain with ROM. No injury noted. Pain is 8/10 when at its worse. Has only been taking occassional aleve for the pain which does help. Increase pain at night. HTN follow up:  Compliant w/ meds, low salt diet, and exercise. No home bp monitoring. No swelling, headache or dizziness. No chest pain, SOB, palpitations. Otherwise feeling well since the last visit. DM type II follow up:  Compliant w/ meds, diabetic diet, and exercise. She has been out of glipizide for the past week d/t insurance not covering the medication. Obtains home glucose monitoring averaging 100-140. Checks BS daily on most days and prn. Pt does not have BS log at visit today. No Rf needed for today. Denies any tingling sensation, polyuria and polydipsia. No blurred vision. No significant weight changes. Feeling well since last OV. Hypercholesterolemia follow up:  Compliant w/ low fat, low cholesterol diet. Exercising some. Off of Lipitor d/t muscle aching. Started on crestro but this caused muscle cramping also. Osteoarthritis:  Patient has osteoarthritis. Has joint aching a stiffness that comes and goes. Lower back pain is better after getting the FLAVIO. Symptoms onset: problem is longstanding. Rheumatological ROS: stable, mild-to-moderate joint symptoms intermittently, reasonably well controlled by PRN meds. Response to treatment plan: stable and intermittent. HM:  Microalbumin 4/19/2019   Mammogram 10/25/2019   Colonoscopy 4/25/2018 by Dr. Talia Espino- repeat in [address] exam 8/28/2019 by Dr. Justine Kulkarni.     Patient Active Problem List   Diagnosis Code    DJD (degenerative joint disease) back M19.90    Hypokalemia E87.6    History of hysterectomy, supracervical Z90.711    Mixed hyperlipidemia E78.2  Rotator cuff tear M75.100    Unspecified vitamin D deficiency E55.9    Essential hypertension I10    Type 2 diabetes mellitus without complication (HCC) O10.3    Other osteoarthritis of spine, lumbar region M47.896    Encounter for medication monitoring Z51.81    Rotator cuff arthropathy M12.819    S/P rotator cuff repair Z98.890    Severe obesity (BMI 35.0-39. 9) with comorbidity ([facility] [address] E66.01    Type 2 diabetes mellitus with diabetic neuropathy (HCC) E11.40       Current Outpatient Medications   Medication Sig Dispense Refill    varicella-zoster recombinant, PF, (SHINGRIX) 50 mcg/0.5 mL susr injection 0.5 mL by IntraMUSCular route once for 1 dose. Repeat second dose in 6 months. 0.5 mL 1    predniSONE (STERAPRED DS) 10 mg dose pack See administration instruction per 10mg dose pack 21 Tab 0    ALPRAZolam (XANAX) 0.5 mg tablet TAKE 1 TABLET BY MOUTH EVERY NIGHT AT BEDTIME AS NEEDED FOR SLEEP 30 Tab 1    glimepiride (AMARYL) 2 mg tablet Take 1 Tab by mouth every morning. For blood sugar. This is in place of glipizide. 90 Tab 3    acetaminophen (TYLENOL EXTRA STRENGTH) 500 mg tablet Take 500 mg by mouth every six (6) hours as needed for Pain.  SITagliptin-metFORMIN (JANUMET XR) 50-1,000 mg TM24 TAKE 1 TABLET BY MOUTH TWICE DAILY WITH MEALS 60 Tab 11    cyanocobalamin (VITAMIN B-12) 1,000 mcg tablet Take 1,000 mcg by mouth daily.  glucose blood VI test strips (ACCU-CHEK SMARTVIEW TEST STRIP) strip Use to check blood sugar twice daily and prn. 200 Strip 3    Lancets (ACCU-CHEK FASTCLIX) misc Use to obtain blood sample for glucose testing daily and prn. 100 Each 3    valsartan-hydrochlorothiazide (DIOVAN-HCT) 320-25 mg per tablet Take 1 Tab by mouth daily. 30 Tab 11    aspirin 81 mg tablet Take 81 mg by mouth daily.  albuterol (PROVENTIL HFA, VENTOLIN HFA, PROAIR HFA) 90 mcg/actuation inhaler Take 2 Puffs by inhalation every four (4) hours as needed for Wheezing or Shortness of Breath. 1 Inhaler 11       Allergies   Allergen Reactions    Latex Rash    Codeine Nausea and Vomiting    Keflex [Cephalexin] Nausea and Vomiting    Lipitor [Atorvastatin] Myalgia    Oxycodone Nausea and Vomiting    Percocet [Oxycodone-Acetaminophen] Nausea and Vomiting    Tessalon [Benzonatate] Hives and Itching    Tramadol Other (comments) and Nausea and Vomiting     dizziness    Vicodin [Hydrocodone-Acetaminophen] Nausea and Vomiting       Past Medical History:   Diagnosis Date    DJD (degenerative joint disease) back 4/10/2010    and neck    DM (diabetes mellitus) ([facility] [address] 4/10/2010    Elevated cholesterol 4/10/2010    HTN (hypertension) 4/10/2010    Hypokalemia 4/10/2010    Unspecified vitamin D deficiency        Past Surgical History:   Procedure Laterality Date    HX HYSTERECTOMY  6/14/04    [address]    HX MOHS PROCEDURES  11/10/2015    right shoulder    HX ORTHOPAEDIC  4/29/13    LEFT ROTATOR CUFF REPAIR      HX ORTHOPAEDIC  06/20/13    shoulder manipulation for frozen shoulder    HX ORTHOPAEDIC  8/5/2014    pt states she had injection by ortho in right groin    HX ORTHOPAEDIC  2/16/2016    right shoulder manipulation    HX ORTHOPAEDIC  11/10/2015    right rotator cuff    HX ORTHOPAEDIC  02/25/2019    steroid injection L3 L4    HX ORTHOPAEDIC  11/04/2019    steroid injection- L3 L4    HX TONSILLECTOMY      age 31    MS COLONOSCOPY FLX DX W/COLLJ SPEC WHEN PFRMD  03/28/2008    Dr Hannah Martinez       Family History   Problem Relation Age of Onset    Hypertension Mother     Diabetes Mother     Kidney Disease Mother     Stroke Mother         TIA    Heart Attack Father     Bleeding Prob Father         taking coumadin    Bipolar Disorder Sister     Hypertension Sister     No Known Problems Sister        Social History     Tobacco Use    Smoking status: Never Smoker    Smokeless tobacco: Never Used   Substance Use Topics    Alcohol use: No     Alcohol/week: 0.0 standard drinks        Lab Results Component Value Date/Time    WBC 5.5 08/27/2019 11:52 AM    HGB 12.3 08/27/2019 11:52 AM    HCT 37.1 08/27/2019 11:52 AM    PLATELET 434 48/74/0461 11:52 AM    MCV 82 08/27/2019 11:52 AM     Lab Results   Component Value Date/Time    Cholesterol, total 189 08/27/2019 11:52 AM    HDL Cholesterol 50 08/27/2019 11:52 AM    LDL, calculated 125 (H) 08/27/2019 11:52 AM    Triglyceride 70 08/27/2019 11:52 AM    CHOL/HDL Ratio 3.9 05/04/2010 04:50 PM     Lab Results   Component Value Date/Time    TSH 1.020 08/02/2017 09:55 AM      Lab Results   Component Value Date/Time    Sodium 143 08/27/2019 11:52 AM    Potassium 3.9 08/27/2019 11:52 AM    Chloride 101 08/27/2019 11:52 AM    CO2 26 08/27/2019 11:52 AM    Anion gap 11 06/12/2014 09:23 PM    Glucose 109 (H) 08/27/2019 11:52 AM    BUN 13 08/27/2019 11:52 AM    Creatinine 0.75 08/27/2019 11:52 AM    BUN/Creatinine ratio 17 08/27/2019 11:52 AM    GFR est AA 99 08/27/2019 11:52 AM    GFR est non-AA 86 08/27/2019 11:52 AM    Calcium 9.4 08/27/2019 11:52 AM    Bilirubin, total 0.6 08/27/2019 11:52 AM    ALT (SGPT) 20 08/27/2019 11:52 AM    AST (SGOT) 23 08/27/2019 11:52 AM    Alk. phosphatase 84 08/27/2019 11:52 AM    Protein, total 7.2 08/27/2019 11:52 AM    Albumin 4.4 08/27/2019 11:52 AM    Globulin 3.9 06/12/2014 09:23 PM    A-G Ratio 1.6 08/27/2019 11:52 AM      Lab Results   Component Value Date/Time    Hemoglobin A1c 6.9 (H) 02/20/2015 09:57 AM    Hemoglobin A1c (POC) 6.3 08/27/2019 12:01 PM         Review of Systems   Constitutional: Negative for malaise/fatigue. HENT: Negative for congestion. Eyes: Negative for blurred vision. Respiratory: Negative for cough and shortness of breath. Cardiovascular: Negative for chest pain, palpitations and leg swelling. Gastrointestinal: Negative for abdominal pain, constipation and heartburn. Genitourinary: Negative for dysuria, frequency and urgency. Musculoskeletal: Negative for back pain and joint pain. Neurological: Negative for dizziness, tingling and headaches. Endo/Heme/Allergies: Negative for environmental allergies. Psychiatric/Behavioral: Negative for depression. The patient does not have insomnia. Physical Exam  Vitals signs and nursing note reviewed. Constitutional:       Appearance: Normal appearance. She is well-developed. Comments: /67 (BP 1 Location: Right arm, BP Patient Position: Sitting)   Pulse 72   Temp 98.4 °F (36.9 °C) (Oral)   Resp 16   Ht 5' 2\" (1.575 m)   Wt 200 lb (90.7 kg)   LMP 06/14/2004   SpO2 97%   BMI 36.58 kg/m²    HENT:      Right Ear: Tympanic membrane and ear canal normal.      Left Ear: Tympanic membrane and ear canal normal.      Nose: No mucosal edema or rhinorrhea. Neck:      Musculoskeletal: Normal range of motion and neck supple. Thyroid: No thyromegaly. Cardiovascular:      Rate and Rhythm: Normal rate and regular rhythm. Heart sounds: Normal heart sounds. No gallop. Pulmonary:      Effort: Pulmonary effort is normal.      Breath sounds: Normal breath sounds. Abdominal:      General: Bowel sounds are normal.      Palpations: Abdomen is soft. There is no mass. Tenderness: There is no tenderness. Musculoskeletal:      Right shoulder: She exhibits decreased range of motion, tenderness, bony tenderness and pain. She exhibits no spasm, normal pulse and normal strength. Lymphadenopathy:      Cervical: No cervical adenopathy. Skin:     General: Skin is warm and dry. ASSESSMENT and PLAN  Diagnoses and all orders for this visit:    1. Essential hypertension  Stable     2. Type 2 diabetes mellitus without complication, unspecified whether long term insulin use (HCC)  a1c level at 6.3%  -     AMB POC HEMOGLOBIN A1C  -     AMB POC GLUCOSE, QUANTITATIVE, BLOOD    3. Mixed hyperlipidemia  -     LIPID PANEL    4. Primary osteoarthritis involving multiple joints  Stable     5.  Tendinitis of left rotator cuff  -     XR SHOULDER LT AP/LAT MIN 2 V; Future  -     predniSONE (STERAPRED DS) 10 mg dose pack; See administration instruction per 10mg dose pack  Instructions for exercises given and reviewed with pt. Pt also to use heat to the area 3-4 times a day over the next several days until sx are improved. 6. Encounter for medication monitoring  -     METABOLIC PANEL, COMPREHENSIVE    7. Non morbid obesity  I have reviewed/discussed the above normal BMI with the patient. I have recommended the following interventions: dietary management education, guidance, and counseling . 8. Need for shingles vaccine  -     varicella-zoster recombinant, PF, (SHINGRIX) 50 mcg/0.5 mL susr injection; 0.5 mL by IntraMUSCular route once for 1 dose. Repeat second dose in 6 months. Follow-up and Dispositions    · Return in about 4 months (around 5/15/2020). reviewed diet, exercise and weight control  cardiovascular risk and specific lipid/LDL goals reviewed  reviewed medications and side effects in detail  specific diabetic recommendations: low cholesterol diet, weight control and daily exercise discussed, all medications, side effects and compliance discussed carefully, foot care discussed and Podiatry visits discussed, annual eye examinations at Ophthalmology discussed and glycohemoglobin and other lab monitoring discussed     I have discussed diagnosis listed in this note with pt and/or family. I have discussed treatment plans and options and the risk/benefit analysis of those options, including safe use of medications and possible medication side effects. Through the use of shared decision making we have agreed to the above plan. The patient has received an after-visit summary and questions were answered concerning future plans and follow up. Advise pt of any urgent changes then to proceed to the ER.

## 2020-01-15 NOTE — PROGRESS NOTES
Chief Complaint   Patient presents with    Cholesterol Problem     follow up    Hypertension     follow up    Diabetes     follow up    Neck Pain     patient c/o pain left side neck pain and radiates to shoulder     Patient rates neck pain 6/10 and left shoulder pain 10/10. Patient had left rotator cuff surgery on 2013 by Dr. Marco A Riley. Patient states she stopped taking Lipitor due to causing muscle cramps in legs. A1C 8/27/2019    Microalbumin 4/19/2019    Mammogram 10/25/2019    Colonoscopy 4/25/2018 by Dr. Shun Soto- repeat in 10 years    Eye exam 8/28/2019 by Dr. Mel Martinez. 1. Have you been to the ER, urgent care clinic since your last visit? Hospitalized since your last visit? No    2. Have you seen or consulted any other health care providers outside of the [address] [place] [address] since your last visit? Include any pap smears or colon screening.  No

## 2020-01-16 LAB
ALBUMIN SERPL-MCNC: 4.6 G/DL (ref 3.6–4.8)
ALBUMIN/GLOB SERPL: 1.6 {RATIO} (ref 1.2–2.2)
ALP SERPL-CCNC: 91 IU/L (ref 39–117)
ALT SERPL-CCNC: 18 IU/L (ref 0–32)
AST SERPL-CCNC: 19 IU/L (ref 0–40)
BILIRUB SERPL-MCNC: 0.7 MG/DL (ref 0–1.2)
BUN SERPL-MCNC: 15 MG/DL (ref 8–27)
BUN/CREAT SERPL: 18 (ref 12–28)
CALCIUM SERPL-MCNC: 9.8 MG/DL (ref 8.7–10.3)
CHLORIDE SERPL-SCNC: 101 MMOL/L (ref 96–106)
CHOLEST SERPL-MCNC: 201 MG/DL (ref 100–199)
CO2 SERPL-SCNC: 23 MMOL/L (ref 20–29)
CREAT SERPL-MCNC: 0.83 MG/DL (ref 0.57–1)
GLOBULIN SER CALC-MCNC: 2.9 G/DL (ref 1.5–4.5)
GLUCOSE SERPL-MCNC: 112 MG/DL (ref 65–99)
HDLC SERPL-MCNC: 55 MG/DL
INTERPRETATION, 910389: NORMAL
LDLC SERPL CALC-MCNC: 129 MG/DL (ref 0–99)
POTASSIUM SERPL-SCNC: 3.9 MMOL/L (ref 3.5–5.2)
PROT SERPL-MCNC: 7.5 G/DL (ref 6–8.5)
SODIUM SERPL-SCNC: 140 MMOL/L (ref 134–144)
TRIGL SERPL-MCNC: 83 MG/DL (ref 0–149)
VLDLC SERPL CALC-MCNC: 17 MG/DL (ref 5–40)

## 2020-03-27 DIAGNOSIS — F51.01 PRIMARY INSOMNIA: ICD-10-CM

## 2020-03-27 RX ORDER — ALPRAZOLAM 0.5 MG/1
TABLET ORAL
Qty: 30 TAB | Refills: 3 | OUTPATIENT
Start: 2020-03-27 | End: 2020-10-26

## 2020-06-15 ENCOUNTER — OFFICE VISIT (OUTPATIENT)
Dept: FAMILY MEDICINE CLINIC | Age: 65
End: 2020-06-15

## 2020-06-15 VITALS
HEIGHT: 62 IN | HEART RATE: 70 BPM | WEIGHT: 206.4 LBS | OXYGEN SATURATION: 98 % | BODY MASS INDEX: 37.98 KG/M2 | RESPIRATION RATE: 18 BRPM | SYSTOLIC BLOOD PRESSURE: 138 MMHG | TEMPERATURE: 97.1 F | DIASTOLIC BLOOD PRESSURE: 79 MMHG

## 2020-06-15 DIAGNOSIS — E11.9 TYPE 2 DIABETES MELLITUS WITHOUT COMPLICATION, UNSPECIFIED WHETHER LONG TERM INSULIN USE (HCC): ICD-10-CM

## 2020-06-15 DIAGNOSIS — M15.9 PRIMARY OSTEOARTHRITIS INVOLVING MULTIPLE JOINTS: ICD-10-CM

## 2020-06-15 DIAGNOSIS — I10 ESSENTIAL HYPERTENSION: Primary | ICD-10-CM

## 2020-06-15 DIAGNOSIS — E66.9 NON MORBID OBESITY: ICD-10-CM

## 2020-06-15 DIAGNOSIS — E78.2 MIXED HYPERLIPIDEMIA: ICD-10-CM

## 2020-06-15 DIAGNOSIS — Z51.81 ENCOUNTER FOR MEDICATION MONITORING: ICD-10-CM

## 2020-06-15 RX ORDER — CHOLECALCIFEROL (VITAMIN D3) 125 MCG
1 CAPSULE ORAL DAILY
COMMUNITY

## 2020-06-15 NOTE — PROGRESS NOTES
Chief Complaint   Patient presents with    Hypertension     follow up    Cholesterol Problem     follow up    Diabetes     follow up       A1C 1/15/2020    Microalbumin 4/19/2019    Mammogram 10/25/2019    Colonoscopy 4/25/2018 by Dr. Jason Rao- repeat in 10 years    Eye exam 8/28/2019 by Dr. Jez Garcia. 1. Have you been to the ER, urgent care clinic since your last visit? Hospitalized since your last visit? No    2. Have you seen or consulted any other health care providers outside of the [address] [place] [address] since your last visit? Include any pap smears or colon screening.  No

## 2020-06-15 NOTE — PROGRESS NOTES
HISTORY OF PRESENT ILLNESS  Maryann Irving is a 60 y.o. female. HPI   Follow up on chronic medical problems. Overall feeling well. Pt is retired and her mother lives with her whom she is her caregiver. Doing the precautionary measures at home to reduce risks of exposure COVID19. Also wearing mask when she is going out. No known sick contacts or known exposure to [address]. HTN follow up:  Compliant w/ meds, low salt diet, and exercise. No home bp monitoring. No swelling, headache or dizziness. No chest pain, SOB, palpitations. Otherwise feeling well since the last visit. DM type II follow up:  Compliant w/ meds, diabetic diet, and exercise. Obtains home glucose monitoring averaging 100-140. Checks BS daily on most days and prn. Pt does not have BS log at visit today. No Rf needed for today. Denies any tingling sensation, polyuria and polydipsia. No blurred vision. No significant weight changes. Feeling well since last OV. Hypercholesterolemia follow up:  Compliant w/ low fat, low cholesterol diet. Exercising some. Off of Lipitor d/t muscle aching. We discussed restarting with another statin pending her results from today. Osteoarthritis:  Patient has osteoarthritis. Has migratory joint aching a stiffness that comes and goes. Symptoms onset: problem is longstanding. Rheumatological ROS: stable, mild-to-moderate joint symptoms intermittently, reasonably well controlled by PRN meds. Response to treatment plan: stable and intermittent. HM:  Mammogram 10/25/2019  Colonoscopy 4/25/2018 by Dr. Shanika Rodriguez- repeat in 10 years  Eye exam 8/28/2019 by Dr. Marge Larson.     Patient Active Problem List   Diagnosis Code    DJD (degenerative joint disease) back M19.90    Hypokalemia E87.6    History of hysterectomy, supracervical Z90.711    Mixed hyperlipidemia E78.2    Rotator cuff tear M75.100    Unspecified vitamin D deficiency E55.9    Essential hypertension I10    Type 2 diabetes mellitus without complication ([facility]) C96.9    Other osteoarthritis of spine, lumbar region M47.896    Encounter for medication monitoring Z51.81    Rotator cuff arthropathy M12.819    S/P rotator cuff repair Z98.890    Severe obesity (BMI 35.0-39. 9) with comorbidity ([facility] [address] E66.01    Type 2 diabetes mellitus with diabetic neuropathy ([facility]) E11.40       Current Outpatient Medications   Medication Sig Dispense Refill    cholecalciferol, vitamin D3, (Vitamin D3) 50 mcg (2,000 unit) tab Take 1 Tab by mouth daily.  ALPRAZolam (XANAX) 0.5 mg tablet TAKE 1 TABLET BY MOUTH EVERY NIGHT AT BEDTIME AS NEEDED FOR SLEEP 30 Tab 3    glimepiride (AMARYL) 2 mg tablet Take 1 Tab by mouth every morning. For blood sugar. This is in place of glipizide. 90 Tab 3    acetaminophen (TYLENOL EXTRA STRENGTH) 500 mg tablet Take 500 mg by mouth every six (6) hours as needed for Pain.  SITagliptin-metFORMIN (JANUMET XR) 50-1,000 mg TM24 TAKE 1 TABLET BY MOUTH TWICE DAILY WITH MEALS 60 Tab 11    cyanocobalamin (VITAMIN B-12) 1,000 mcg tablet Take 1,000 mcg by mouth daily.  glucose blood VI test strips (ACCU-CHEK SMARTVIEW TEST STRIP) strip Use to check blood sugar twice daily and prn. 200 Strip 3    Lancets (ACCU-CHEK FASTCLIX) misc Use to obtain blood sample for glucose testing daily and prn. 100 Each 3    valsartan-hydrochlorothiazide (DIOVAN-HCT) 320-25 mg per tablet Take 1 Tab by mouth daily. 30 Tab 11    aspirin 81 mg tablet Take 81 mg by mouth daily.          Allergies   Allergen Reactions    Latex Rash    Codeine Nausea and Vomiting    Keflex [Cephalexin] Nausea and Vomiting    Lipitor [Atorvastatin] Myalgia    Oxycodone Nausea and Vomiting    Percocet [Oxycodone-Acetaminophen] Nausea and Vomiting    Tessalon [Benzonatate] Hives and Itching    Tramadol Other (comments) and Nausea and Vomiting     dizziness    Vicodin [Hydrocodone-Acetaminophen] Nausea and Vomiting       Past Medical History:   Diagnosis Date    DJD (degenerative joint disease) back 4/10/2010    and neck    DM (diabetes mellitus) ([facility] [address] 4/10/2010    Elevated cholesterol 4/10/2010    HTN (hypertension) 4/10/2010    Hypokalemia 4/10/2010    Unspecified vitamin D deficiency        Past Surgical History:   Procedure Laterality Date    HX HYSTERECTOMY  6/14/04    [address]    HX MOHS PROCEDURES  11/10/2015    right shoulder    HX ORTHOPAEDIC  4/29/13    LEFT ROTATOR CUFF REPAIR      HX ORTHOPAEDIC  06/20/13    shoulder manipulation for frozen shoulder    HX ORTHOPAEDIC  8/5/2014    pt states she had injection by ortho in right groin    HX ORTHOPAEDIC  2/16/2016    right shoulder manipulation    HX ORTHOPAEDIC  11/10/2015    right rotator cuff    HX ORTHOPAEDIC  02/25/2019    steroid injection L3 L4    HX ORTHOPAEDIC  11/04/2019    steroid injection- L3 L4    HX TONSILLECTOMY      age 33    [place] COLONOSCOPY FLX DX W/COLLJ SPEC WHEN PFRMD  03/28/2008    Dr Karolina Mohan       Family History   Problem Relation Age of Onset    Hypertension Mother     Diabetes Mother     Kidney Disease Mother     Stroke Mother         TIA    Heart Attack Father     Bleeding Prob Father         taking coumadin    Bipolar Disorder Sister     Hypertension Sister     No Known Problems Sister        Social History     Tobacco Use    Smoking status: Never Smoker    Smokeless tobacco: Never Used   Substance Use Topics    Alcohol use: No     Alcohol/week: 0.0 standard drinks        Lab Results   Component Value Date/Time    WBC 5.5 08/27/2019 11:52 AM    HGB 12.3 08/27/2019 11:52 AM    HCT 37.1 08/27/2019 11:52 AM    PLATELET 587 13/54/2866 11:52 AM    MCV 82 08/27/2019 11:52 AM     Lab Results   Component Value Date/Time    Cholesterol, total 201 (H) 01/15/2020 09:45 AM    HDL Cholesterol 55 01/15/2020 09:45 AM    LDL, calculated 129 (H) 01/15/2020 09:45 AM    Triglyceride 83 01/15/2020 09:45 AM    CHOL/HDL Ratio 3.9 05/04/2010 04:50 PM     Lab Results   Component Value Date/Time TSH 1.020 08/02/2017 09:55 AM      Lab Results   Component Value Date/Time    Sodium 140 01/15/2020 09:45 AM    Potassium 3.9 01/15/2020 09:45 AM    Chloride 101 01/15/2020 09:45 AM    CO2 23 01/15/2020 09:45 AM    Anion gap 11 06/12/2014 09:23 PM    Glucose 112 (H) 01/15/2020 09:45 AM    BUN 15 01/15/2020 09:45 AM    Creatinine 0.83 01/15/2020 09:45 AM    BUN/Creatinine ratio 18 01/15/2020 09:45 AM    GFR est AA 87 01/15/2020 09:45 AM    GFR est non-AA 76 01/15/2020 09:45 AM    Calcium 9.8 01/15/2020 09:45 AM    Bilirubin, total 0.7 01/15/2020 09:45 AM    ALT (SGPT) 18 01/15/2020 09:45 AM    Alk. phosphatase 91 01/15/2020 09:45 AM    Protein, total 7.5 01/15/2020 09:45 AM    Albumin 4.6 01/15/2020 09:45 AM    Globulin 3.9 06/12/2014 09:23 PM    A-G Ratio 1.6 01/15/2020 09:45 AM      Lab Results   Component Value Date/Time    Hemoglobin A1c 6.9 (H) 02/20/2015 09:57 AM    Hemoglobin A1c (POC) 6.3 01/15/2020 09:45 AM         Review of Systems   Constitutional: Negative for malaise/fatigue. HENT: Negative for congestion. Eyes: Negative for blurred vision. Respiratory: Negative for cough and shortness of breath. Cardiovascular: Negative for chest pain, palpitations and leg swelling. Gastrointestinal: Negative for abdominal pain, constipation and heartburn. Genitourinary: Negative for dysuria, frequency and urgency. Musculoskeletal: Negative for joint pain. Neurological: Negative for dizziness, tingling and headaches. Endo/Heme/Allergies: Negative for environmental allergies. Psychiatric/Behavioral: Negative for depression. The patient does not have insomnia. Physical Exam  Vitals signs and nursing note reviewed. Constitutional:       Appearance: Normal appearance. She is well-developed.       Comments: /79 (BP 1 Location: Right arm, BP Patient Position: Sitting)   Pulse 70   Temp 97.1 °F (36.2 °C) (Oral)   Resp 18   Ht 5' 2\" (1.575 m)   Wt 206 lb 6.4 oz (93.6 kg)   LMP 06/14/2004   SpO2 98%   BMI 37.75 kg/m²    HENT:      Right Ear: Tympanic membrane and ear canal normal.      Left Ear: Tympanic membrane and ear canal normal.      Nose: No mucosal edema or rhinorrhea. Neck:      Musculoskeletal: Normal range of motion and neck supple. Thyroid: No thyromegaly. Cardiovascular:      Rate and Rhythm: Normal rate and regular rhythm. Heart sounds: Normal heart sounds. No gallop. Pulmonary:      Effort: Pulmonary effort is normal.      Breath sounds: Normal breath sounds. Abdominal:      General: Bowel sounds are normal.      Palpations: Abdomen is soft. There is no mass. Tenderness: There is no abdominal tenderness. Musculoskeletal: Normal range of motion. General: No swelling. Comments: Foot exam done . Normal sensation to PP, LT and vibration. Sensation intact to microfilament testing. Pulses intact. No swelling. No skin lesions or sores noted. No tinea present. Lymphadenopathy:      Cervical: No cervical adenopathy. Skin:     General: Skin is warm and dry. ASSESSMENT and PLAN  Diagnoses and all orders for this visit:    1. Essential hypertension  Discussed sodium restriction, high k rich diet, maintaining ideal body weight and regular exercise program such as daily walking 30 min perday 4-5 times per week, as physiologic means to achieve blood pressure control.  Medication compliance advised. 2. Type 2 diabetes mellitus without complication, unspecified whether long term insulin use (HCC)  a1c level has been stable at 6.3%. Continue to monitor. Work on diet and exercise.  -     HEMOGLOBIN A1C WITH EAG    3. Mixed hyperlipidemia  -     LIPID PANEL    4. Primary osteoarthritis involving multiple joints  Discussed regular exercises. Use heat to the area 3-4 times a day as needed. 5. Encounter for medication monitoring  -     METABOLIC PANEL, COMPREHENSIVE  -     CBC W/O DIFF    6.  Non morbid obesity  I have reviewed/discussed the above normal BMI with the patient. I have recommended the following interventions: dietary management education, guidance, and counseling . Follow-up and Dispositions    · Return in about 6 months (around 12/1/2020). reviewed diet, exercise and weight control  cardiovascular risk and specific lipid/LDL goals reviewed  reviewed medications and side effects in detail  specific diabetic recommendations: low cholesterol diet, weight control and daily exercise discussed, home glucose monitoring emphasized, all medications, side effects and compliance discussed carefully, foot care discussed and Podiatry visits discussed, annual eye examinations at Ophthalmology discussed and glycohemoglobin and other lab monitoring discussed     I have discussed diagnosis listed in this note with pt and/or family. I have discussed treatment plans and options and the risk/benefit analysis of those options, including safe use of medications and possible medication side effects. Through the use of shared decision making we have agreed to the above plan. The patient has received an after-visit summary and questions were answered concerning future plans and follow up. Advise pt of any urgent changes then to proceed to the ER.

## 2020-07-17 DIAGNOSIS — E11.9 TYPE 2 DIABETES MELLITUS WITHOUT COMPLICATION, UNSPECIFIED WHETHER LONG TERM INSULIN USE (HCC): ICD-10-CM

## 2020-07-17 RX ORDER — SITAGLIPTIN AND METFORMIN HYDROCHLORIDE 50; 1000 MG/1; MG/1
TABLET, FILM COATED, EXTENDED RELEASE ORAL
Qty: 60 TAB | Refills: 11 | Status: SHIPPED | OUTPATIENT
Start: 2020-07-17 | End: 2021-08-09

## 2020-08-04 LAB — SARS-COV-2, NAA: NOT DETECTED

## 2020-08-18 LAB
ALBUMIN SERPL-MCNC: 4.7 G/DL (ref 3.8–4.8)
ALBUMIN/GLOB SERPL: 1.9 {RATIO} (ref 1.2–2.2)
ALP SERPL-CCNC: 95 IU/L (ref 39–117)
ALT SERPL-CCNC: 22 IU/L (ref 0–32)
AST SERPL-CCNC: 24 IU/L (ref 0–40)
BILIRUB SERPL-MCNC: 0.9 MG/DL (ref 0–1.2)
BUN SERPL-MCNC: 15 MG/DL (ref 8–27)
BUN/CREAT SERPL: 19 (ref 12–28)
CALCIUM SERPL-MCNC: 9.6 MG/DL (ref 8.7–10.3)
CHLORIDE SERPL-SCNC: 101 MMOL/L (ref 96–106)
CHOLEST SERPL-MCNC: 209 MG/DL (ref 100–199)
CO2 SERPL-SCNC: 24 MMOL/L (ref 20–29)
CREAT SERPL-MCNC: 0.81 MG/DL (ref 0.57–1)
ERYTHROCYTE [DISTWIDTH] IN BLOOD BY AUTOMATED COUNT: 15 % (ref 11.7–15.4)
EST. AVERAGE GLUCOSE BLD GHB EST-MCNC: 137 MG/DL
GLOBULIN SER CALC-MCNC: 2.5 G/DL (ref 1.5–4.5)
GLUCOSE SERPL-MCNC: 108 MG/DL (ref 65–99)
HBA1C MFR BLD: 6.4 % (ref 4.8–5.6)
HCT VFR BLD AUTO: 40.2 % (ref 34–46.6)
HDLC SERPL-MCNC: 51 MG/DL
HGB BLD-MCNC: 13.4 G/DL (ref 11.1–15.9)
INTERPRETATION, 910389: NORMAL
LDLC SERPL CALC-MCNC: 142 MG/DL (ref 0–99)
Lab: NORMAL
MCH RBC QN AUTO: 27.5 PG (ref 26.6–33)
MCHC RBC AUTO-ENTMCNC: 33.3 G/DL (ref 31.5–35.7)
MCV RBC AUTO: 83 FL (ref 79–97)
PLATELET # BLD AUTO: 208 X10E3/UL (ref 150–450)
POTASSIUM SERPL-SCNC: 4.8 MMOL/L (ref 3.5–5.2)
PROT SERPL-MCNC: 7.2 G/DL (ref 6–8.5)
RBC # BLD AUTO: 4.87 X10E6/UL (ref 3.77–5.28)
SODIUM SERPL-SCNC: 141 MMOL/L (ref 134–144)
TRIGL SERPL-MCNC: 80 MG/DL (ref 0–149)
VLDLC SERPL CALC-MCNC: 16 MG/DL (ref 5–40)
WBC # BLD AUTO: 6.2 X10E3/UL (ref 3.4–10.8)

## 2020-09-01 ENCOUNTER — TELEPHONE (OUTPATIENT)
Dept: FAMILY MEDICINE CLINIC | Age: 65
End: 2020-09-01

## 2020-09-17 ENCOUNTER — TELEPHONE (OUTPATIENT)
Dept: FAMILY MEDICINE CLINIC | Age: 65
End: 2020-09-17

## 2020-09-17 RX ORDER — ROSUVASTATIN CALCIUM 10 MG/1
10 TABLET, COATED ORAL
Qty: 30 TAB | Refills: 3 | Status: SHIPPED | OUTPATIENT
Start: 2020-09-17 | End: 2021-02-18

## 2020-09-17 NOTE — TELEPHONE ENCOUNTER
Patient was returning call to Melissa Batres she would like a call back @ [phone] or [phone]

## 2020-10-24 DIAGNOSIS — F51.01 PRIMARY INSOMNIA: ICD-10-CM

## 2020-10-26 ENCOUNTER — HOSPITAL ENCOUNTER (OUTPATIENT)
Dept: MAMMOGRAPHY | Age: 65
Discharge: HOME | End: 2020-10-26
Attending: FAMILY MEDICINE
Payer: COMMERCIAL

## 2020-10-26 DIAGNOSIS — Z12.31 VISIT FOR SCREENING MAMMOGRAM: ICD-10-CM

## 2020-10-26 PROCEDURE — 77067 SCR MAMMO BI INCL CAD: CPT

## 2020-10-26 RX ORDER — ALPRAZOLAM 0.5 MG/1
TABLET ORAL
Qty: 30 TAB | Refills: 1 | Status: SHIPPED | OUTPATIENT
Start: 2020-10-26 | End: 2021-05-04

## 2020-11-18 DIAGNOSIS — I10 HTN (HYPERTENSION): ICD-10-CM

## 2020-11-18 RX ORDER — VALSARTAN AND HYDROCHLOROTHIAZIDE 320; 25 MG/1; MG/1
1 TABLET, FILM COATED ORAL DAILY
Qty: 30 TAB | Refills: 11 | Status: SHIPPED | OUTPATIENT
Start: 2020-11-18 | End: 2021-12-07

## 2020-11-18 NOTE — TELEPHONE ENCOUNTER
Last visit: 6/15/20  Next visit:12/08/20  Previous refill 4/02/14(30+11R) (per med [facility])    Requested Prescriptions     Pending Prescriptions Disp Refills    valsartan-hydroCHLOROthiazide (DIOVAN-HCT) 320-25 mg per tablet 30 Tab 11     Sig: Take 1 Tab by mouth daily.

## 2020-12-03 ENCOUNTER — TELEPHONE (OUTPATIENT)
Dept: FAMILY MEDICINE CLINIC | Age: 65
End: 2020-12-03

## 2020-12-03 DIAGNOSIS — Z51.81 ENCOUNTER FOR MEDICATION MONITORING: ICD-10-CM

## 2020-12-03 DIAGNOSIS — E78.2 MIXED HYPERLIPIDEMIA: ICD-10-CM

## 2020-12-03 DIAGNOSIS — E11.9 TYPE 2 DIABETES MELLITUS WITHOUT COMPLICATION, UNSPECIFIED WHETHER LONG TERM INSULIN USE (HCC): Primary | ICD-10-CM

## 2020-12-03 NOTE — TELEPHONE ENCOUNTER
Patient states that she is on Crestor and she needs her cholesterol checked. She states that she will  the order for [facility] on Monday 12/7/20.  Telephone number [phone]

## 2020-12-07 ENCOUNTER — TELEPHONE (OUTPATIENT)
Dept: FAMILY MEDICINE CLINIC | Age: 65
End: 2020-12-07

## 2020-12-07 NOTE — TELEPHONE ENCOUNTER
----- Message from Arias Calles sent at 12/7/2020 10:31 AM EST -----  Regarding: Dr. Yakov Avila first and last name: PT      Reason for call: Pt requesting \"micro albumin\" urine test be added to her labs. She received a letter from her nurse  requesting this test be done. Callback required yes/no and why: yes      Best contact number(s): [phone]      Details to clarify the request: Pt would like to  orders when completed.       Arias Calles

## 2020-12-10 ENCOUNTER — TELEPHONE (OUTPATIENT)
Dept: FAMILY MEDICINE CLINIC | Age: 65
End: 2020-12-10

## 2020-12-10 NOTE — TELEPHONE ENCOUNTER
----- Message from Melany Law sent at 12/10/2020 10:43 AM EST -----  Regarding: Evita/Telephone  Caller's first and last name: n/a  Reason for call: unable to come to off  Callback required yes/no and why: yes  Best contact number(s): [phone]  Details to clarify the request: Ms. Makenna Qiu is requesting to have the lab order sent to her house as she is unable to come to pick them up as she is ill w/cold.

## 2021-02-18 RX ORDER — ROSUVASTATIN CALCIUM 10 MG/1
TABLET, COATED ORAL
Qty: 30 TAB | Refills: 3 | Status: SHIPPED | OUTPATIENT
Start: 2021-02-18 | End: 2021-07-09

## 2021-04-07 LAB
ALBUMIN SERPL-MCNC: 4.6 G/DL (ref 3.8–4.8)
ALP SERPL-CCNC: 91 IU/L (ref 39–117)
ALT SERPL-CCNC: 14 IU/L (ref 0–32)
AST SERPL-CCNC: 17 IU/L (ref 0–40)
BILIRUB DIRECT SERPL-MCNC: 0.19 MG/DL (ref 0–0.4)
BILIRUB SERPL-MCNC: 0.7 MG/DL (ref 0–1.2)
CHOLEST SERPL-MCNC: 129 MG/DL (ref 100–199)
CK SERPL-CCNC: 110 U/L (ref 32–182)
EST. AVERAGE GLUCOSE BLD GHB EST-MCNC: 148 MG/DL
HBA1C MFR BLD: 6.8 % (ref 4.8–5.6)
HDLC SERPL-MCNC: 47 MG/DL
IMP & REVIEW OF LAB RESULTS: NORMAL
LDLC SERPL CALC-MCNC: 70 MG/DL (ref 0–99)
Lab: NORMAL
PROT SERPL-MCNC: 7 G/DL (ref 6–8.5)
TRIGL SERPL-MCNC: 56 MG/DL (ref 0–149)
VLDLC SERPL CALC-MCNC: 12 MG/DL (ref 5–40)

## 2021-04-13 ENCOUNTER — OFFICE VISIT (OUTPATIENT)
Dept: FAMILY MEDICINE CLINIC | Age: 66
End: 2021-04-13
Payer: COMMERCIAL

## 2021-04-13 VITALS
HEIGHT: 62 IN | BODY MASS INDEX: 38.68 KG/M2 | SYSTOLIC BLOOD PRESSURE: 138 MMHG | TEMPERATURE: 97.5 F | OXYGEN SATURATION: 98 % | DIASTOLIC BLOOD PRESSURE: 77 MMHG | HEART RATE: 72 BPM | WEIGHT: 210.2 LBS | RESPIRATION RATE: 16 BRPM

## 2021-04-13 DIAGNOSIS — E78.2 MIXED HYPERLIPIDEMIA: ICD-10-CM

## 2021-04-13 DIAGNOSIS — Z51.81 ENCOUNTER FOR MEDICATION MONITORING: ICD-10-CM

## 2021-04-13 DIAGNOSIS — E11.9 TYPE 2 DIABETES MELLITUS WITHOUT COMPLICATION, UNSPECIFIED WHETHER LONG TERM INSULIN USE (HCC): ICD-10-CM

## 2021-04-13 DIAGNOSIS — I10 ESSENTIAL HYPERTENSION: Primary | ICD-10-CM

## 2021-04-13 DIAGNOSIS — E66.9 NON MORBID OBESITY: ICD-10-CM

## 2021-04-13 DIAGNOSIS — M15.9 PRIMARY OSTEOARTHRITIS INVOLVING MULTIPLE JOINTS: ICD-10-CM

## 2021-04-13 LAB
BILIRUB UR QL STRIP: NEGATIVE
CREAT UR-MCNC: 62.3 MG/DL
GLUCOSE UR-MCNC: NEGATIVE MG/DL
KETONES P FAST UR STRIP-MCNC: NEGATIVE MG/DL
MICROALBUMIN UR-MCNC: 0.91 MG/DL
MICROALBUMIN/CREAT UR-RTO: 15 MG/G (ref 0–30)
PH UR STRIP: 5 [PH] (ref 4.6–8)
PROT UR QL STRIP: NEGATIVE
SP GR UR STRIP: 1.02 (ref 1–1.03)
UA UROBILINOGEN AMB POC: NORMAL (ref 0.2–1)
URINALYSIS CLARITY POC: CLEAR
URINALYSIS COLOR POC: YELLOW
URINE BLOOD POC: NEGATIVE
URINE LEUKOCYTES POC: NORMAL
URINE NITRITES POC: NEGATIVE

## 2021-04-13 PROCEDURE — 81003 URINALYSIS AUTO W/O SCOPE: CPT | Performed by: FAMILY MEDICINE

## 2021-04-13 PROCEDURE — 99213 OFFICE O/P EST LOW 20 MIN: CPT | Performed by: FAMILY MEDICINE

## 2021-04-13 RX ORDER — GLIMEPIRIDE 2 MG/1
TABLET ORAL
Qty: 90 TAB | Refills: 3 | Status: SHIPPED | OUTPATIENT
Start: 2021-04-13 | End: 2022-07-08

## 2021-04-13 NOTE — LETTER
CONTROLLED SUBSTANCE MEDICATION AGREEMENT Patient Name: Joanne Gunderson Patient YOB: 1955 I understand, that controlled substance medications may be used to help better manage my symptoms and to improve my ability to function at home, work and in social settings. However, I also understand that these medications do have risks, which have been discussed with me, including possible development of physical or psychological dependence. I understand that successful treatment requires mutual trust and honesty between me and my provider. I understand and agree that following this Medication Agreement is necessary in continuing my provider-patient relationship and the success of my treatment plan. Explanation from my Provider: Benefits and Goals of Controlled Substance Medications: There are two potential goals for your treatment: (1) decreased pain and suffering (2) improved daily life functions. There are many possible treatments for your chronic condition(s). Alternatives such as physical therapy, yoga, massage, home daily exercise, meditation, relaxation techniques, injections, chiropractic manipulations, surgery, cognitive therapy, hypnosis and many medications that are not habit-forming may be used. Use of controlled substance medications may be helpful, but they are unlikely to resolve all symptoms or restore all function. Explanation from my Provider: Risks of Controlled Substance Medications: 
 Opioid pain medications: These medications can lead to problems such as addiction/dependence, sedation, lightheadedness/dizziness, memory issues, falls, constipation, nausea, or vomiting. They may also impair the ability to drive or operate machinery. Additionally, these medications may lower testosterone levels, leading to loss of bone strength, stamina and sex drive.   They may cause problems with breathing, sleep apnea and reduced coughing, which is especially dangerous for patients with lung disease. Overdose or dangerous interactions with alcohol and other medications may occur, leading to death. Hyperalgesia may develop, which means patients receiving opioids for the treatment of pain may become more sensitive to certain painful stimuli, and in some cases, experience pain from ordinarily non-painful stimuli. Women between the ages of 14-53 who could become pregnant should carefully weigh the risks and benefits of opioids with their physicians, as these medications increase the risk of pregnancy complications, including miscarriage,  delivery and stillbirth. It is also possible for babies to be born addicted to opioids. Opioid dependence withdrawal symptoms may include; feelings of uneasiness, increased pain, irritability, belly pain, diarrhea, sweats and goose-flesh. Testosterone replacement therapy:  Potential side effects include increased risk of stroke and heart attack, blood clots, increased blood pressure, increased cholesterol, enlarged prostate, sleep apnea, irritability/aggression and other mood disorders, and decreased fertility. Jassi Waldron (1955)             Page 1 of 4    Initials:_______ Benzodiazepines and non-benzodiazepine sleep medications: These medications can lead to problems such as addiction/dependence, sedation, fatigue, lightheadedness, dizziness, incoordination, falls, depression, hallucinations, and impaired judgment, memory and concentration. The ability to drive and operate machinery may also be affected. Abnormal sleep-related behaviors have been reported, including sleepwalking, driving, making telephone calls, eating, or having sex while not fully awake. These medications can suppress breathing and worsen sleep apnea, particularly when combined with alcohol or other sedating medications, potentially leading to death. Dependence withdrawal symptoms may include tremors, anxiety, hallucinations and seizures.  
 Stimulants:  Common adverse effects include addiction/dependence, increased blood pressure and heart rate, decreased appetite, nausea, involuntary weight loss, insomnia,  irritability, and headaches. These risks may increase when these medications are combined with other stimulants, such as caffeine pills or energy drinks, certain weight loss supplements and oral decongestants. Dependence withdrawal symptoms may include depressed mood, loss of interest, suicidal thoughts, anxiety, fatigue, appetite changes and agitation. I agree and understand that I and my prescriber have the following rights and responsibilities regarding my treatment plan:  
1. MY RIGHTS: 
To be informed of my treatment and medication plan. To be an active participant in my health and wellbeing. 2. MY RESPONSIBILITY AND UNDERSTANDING FOR USE OF MEDICATIONS  I will take medications at the dose and frequency as directed. For my safety, I will not increase or change how I take my medications without the recommendation of my healthcare provider.  I will actively participate in any program recommended by my provider which may improve function, including social, physical, psychological programs.  I will not take my medications with alcohol or other drugs not prescribed to me. I understand that drinking alcohol with my medications increases the chances of side effects, including reduced breathing rate and could lead to personal injury when operating machinery.  I understand that if I have a history of substance use disorders, including alcohol or other illicit drugs, that I may be at increased risk of addiction to my medications.  I agree to notify my provider immediately if I should become pregnant so that my treatment plan can be adjusted.  
 I agree and understand that I shall only receive controlled substance medications from the prescriber that signed this agreement unless there is written agreement among other prescribers of controlled substances outlining the responsibility of the medications being prescribed.  I understand that the if the controlled medication is not helping to achieve goals, the dosage may be tapered and no longer prescribed. 3. MY RESPONSIBILITY FOR COMMUNICATION / PRESCRIPTION RENEWALS 
 I agree that all controlled substance medications that I take will be prescribed only by my provider. If another healthcare provider prescribes me medication in an emergency, I will notify my provider within seventy-two (72) hours. Joanne Jalyn (1955)             Page 2 of 4    Initials:_______  I will arrange for refills at the prescribed interval ONLY during regular office hours. I will not ask for refills earlier than agreed, after-hours, on holidays or weekends. Refills may take up to 72 hours for processing and prescriptions to reach the pharmacy.  I will inform my other health care providers that I am taking these medications and of the existence of this [id number]. In the event of an emergency, I will provide the same information to the emergency department prescribers.  I will keep my provider updated on the pharmacy I am using for controlled medication prescription filling. 4. MY RESPONSIBILITY FOR PROTECTING MEDICATIONS  I will protect my prescriptions and medications. I understand that lost or misplaced prescriptions will not be replaced.  I will keep medications only for my own use and will not share them with others. I will keep all medications away from children.  I agree that if my medications are adjusted or discontinued, I will properly dispose of any remaining medications. I understand that I will be required to dispose of any remaining controlled medications as, directed by my prescriber, prior to being provided with any prescriptions for other controlled medications.   Medication drop box locations can be found at: [employer] 5. MY RESPONSIBILITY WITH ILLEGAL DRUGS  I will not use illegal or street drugs or another person's prescription medications not prescribed to me.  If there are identified addiction type symptoms, then referral to a program may be provided by my provider and I agree to follow through with this recommendation. 6. MY RESPONSIBILITY FOR COOPERATION WITH INVESTIGATIONS  I understand that my provider will comply with any applicable law and may discuss my use and/or possible misuse/abuse of controlled substances and alcohol, as appropriate, with any health care provider involved in my care, pharmacist, or legal authority.  I authorize my provider and pharmacy to cooperate fully with law enforcement agencies (as permitted by law) in the investigation of any possible misuse, sale, or other diversion of my controlled substances.  I agree to waive any applicable privilege or right of privacy or confidentiality with respect to these authorizations. 7. PROVIDERS RIGHT TO MONITOR FOR SAFETY: PRESCRIPTION MONITORING / DRUG TESTING 
 I consent to drug/toxicology screening and will submit to a drug screen upon my providers request to assure I am only taking the prescribed drugs for my safety monitoring. I understand that a drug screen is a laboratory test in which a sample of my urine, blood or saliva is checked to see what drugs I have been taking. This may entail an observed urine specimen, which means that a nurse or other health care provider may watch me provide urine, and I will cooperate if I am asked to provide an observed specimen. Geo Hook (1955)             Page 3 of 4    Initials:_______  I understand that my provider will check a copy of my State Prescription Monitoring Program () Report in order to safely prescribe medications.    
 Pill Counts: I consent to pill counts when requested. I may be asked to bring all my prescribed controlled substance medications, in their original bottles, to all of my scheduled appointments. In addition, my provider may ask me to come to the practice at any time for a random pill count. 8. TERMINATION OF THIS AGREEMENT  For my safety, my prescriber has the right to stop prescribing controlled substance medications and may end this agreement.  Conditions that may result in termination of this agreement: 
a. I do not show any improvement in pain, or my activity has not improved. b. I develop rapid tolerance or loss of improvement, as described in my treatment plan. 
c. I develop significant side effects from the medication. d. My behavior is not consistent with the responsibilities outlined above, thereby causing safety concerns to continue prescribing controlled substance medications. e. I fail to follow the terms of this agreement. f. Other:____________________________ UNDERSTANDING THIS MEDICATION AGREEMENT:   
I have read the above and have had all my questions answered. For chronic disease management, I know that my symptoms can be managed with many types of treatments. A chronic medication trial may be part of my treatment, but I must be an active participant in my care. Medication therapy is only one part of my symptom management plan. In some cases, there may be limited scientific evidence to support the chronic use of certain medications to improve symptoms and daily function. Furthermore, in certain circumstances, there may be scientific information that suggests that the use of chronic controlled substances may worsen my symptoms and increase my risk of unintentional death directly related to this medication therapy. I know that if my provider feels my risk from controlled medications is greater than my benefit, I will have my controlled substance medication(s) compassionately lowered or removed altogether. I further agree to allow this office to contact my HIPAA contact if there are concerns about my safety and use of the controlled medications. I have agreed to use the prescribed controlled substance medications to me as instructed by my provider and as stated in this Medication Agreement. My initial on each page and my signature below shows that I have read each page and I have had the opportunity to ask questions with answers provided by my provider. Patient Name (Printed): _____________________________________ Patient Signature:  ______________________   Date: _____________ Prescriber Name (Printed): ___________________________________ Prescriber Signature: _____________________  Date: _____________ Myah Nath (1955)             Page 4 of 4

## 2021-04-13 NOTE — PROGRESS NOTES
HISTORY OF PRESENT ILLNESS  Sky Ludwig is a 63 y.o. female. HPI   Follow up on chronic medical problems. Overall feeling well. Pt is retired and her mother lives with her whom she is her caregiver. Doing the precautionary measures at home to reduce risks of exposure COVID19. Also wearing mask when she is going out. No known sick contacts or known exposure to Erby Duster. HTN follow up:  Compliant w/ meds, low salt diet, and exercise. No home bp monitoring. No swelling, headache or dizziness. No chest pain, SOB, palpitations. Otherwise feeling well since the last visit. DM type II follow up:  Compliant w/ meds, diabetic diet, and exercise. Obtains home glucose monitoring averaging 100-140. Checks BS daily on most days and prn. Pt does not have BS log at visit today. No Rf needed for today. Denies any tingling sensation, polyuria and polydipsia. No blurred vision. No significant weight changes. Feeling well since last OV. Hypercholesterolemia follow up:  Compliant w/ low fat, low cholesterol diet. Exercising some. Off of Lipitor d/t muscle aching. We discussed restarting with another statin pending her results from today. Osteoarthritis:  Patient has osteoarthritis. Has migratory joint aching a stiffness that comes and goes. Symptoms onset: problem is longstanding. Rheumatological ROS: stable, mild-to-moderate joint symptoms intermittently, reasonably well controlled by PRN meds. Response to treatment plan: stable and intermittent. HM:  Mammogram 10/26/2020   Colonoscopy 4/25/2018 by Dr. Onalee Angelucci- repeat in [address] exam 8/28/2020 by Dr. Susan Doan.     Patient Active Problem List   Diagnosis Code    DJD (degenerative joint disease) back M19.90    Hypokalemia E87.6    History of hysterectomy, supracervical Z90.711    Mixed hyperlipidemia E78.2    Rotator cuff tear M75.100    Unspecified vitamin D deficiency E55.9    Essential hypertension I10    Type 2 diabetes mellitus without complication ([facility]) I01.2    Other osteoarthritis of spine, lumbar region M47.896    Encounter for medication monitoring Z51.81    Rotator cuff arthropathy M12.819    S/P rotator cuff repair Z98.890    Severe obesity (BMI 35.0-39. 9) with comorbidity ([facility]) E66.01    Type 2 diabetes mellitus with diabetic neuropathy ([facility]) E11.40       Current Outpatient Medications   Medication Sig Dispense Refill    rosuvastatin (CRESTOR) 10 mg tablet TAKE 1 TABLET BY MOUTH EVERY EVENING 30 Tab 3    valsartan-hydroCHLOROthiazide (DIOVAN-HCT) 320-25 mg per tablet Take 1 Tab by mouth daily. 30 Tab 11    glimepiride (AMARYL) 2 mg tablet TAKE 1 TABLET BY MOUTH ONCE DAILY IN THE MORNING (REPLACING  GLIPIZIDE) 90 Tab 3    ALPRAZolam (XANAX) 0.5 mg tablet TAKE 1 TABLET BY MOUTH EVERY NIGHT AT BEDTIME AS NEEDED FOR SLEEP 30 Tab 1    SITagliptin-metFORMIN (Janumet XR) 50-1,000 mg TM24 TAKE 1 TABLET BY MOUTH TWICE DAILY WITH MEALS 60 Tab 11    cholecalciferol, vitamin D3, (Vitamin D3) 50 mcg (2,000 unit) tab Take 1 Tab by mouth daily.  acetaminophen (TYLENOL EXTRA STRENGTH) 500 mg tablet Take 500 mg by mouth every six (6) hours as needed for Pain.  cyanocobalamin (VITAMIN B-12) 1,000 mcg tablet Take 1,000 mcg by mouth daily.  glucose blood VI test strips (ACCU-CHEK SMARTVIEW TEST STRIP) strip Use to check blood sugar twice daily and prn. 200 Strip 3    Lancets (ACCU-CHEK FASTCLIX) misc Use to obtain blood sample for glucose testing daily and prn. 100 Each 3    aspirin 81 mg tablet Take 81 mg by mouth daily.          Allergies   Allergen Reactions    Latex Rash    Codeine Nausea and Vomiting    Keflex [Cephalexin] Nausea and Vomiting    Lipitor [Atorvastatin] Myalgia    Oxycodone Nausea and Vomiting    Percocet [Oxycodone-Acetaminophen] Nausea and Vomiting    Tessalon [Benzonatate] Hives and Itching    Tramadol Other (comments) and Nausea and Vomiting     dizziness    Vicodin [Hydrocodone-Acetaminophen] Nausea and Vomiting         Past Medical History:   Diagnosis Date    DJD (degenerative joint disease) back 4/10/2010    and neck    DM [facility]diabetes mellitus) ([facility] [address] 4/10/2010    Elevated cholesterol 4/10/2010    HTN (hypertension) 4/10/2010    Hypokalemia 4/10/2010    Unspecified vitamin D deficiency          Past Surgical History:   Procedure Laterality Date    HX HYSTERECTOMY  6/14/04    [address]    HX MOHS PROCEDURES  11/10/2015    right shoulder    HX ORTHOPAEDIC  4/29/13    LEFT ROTATOR CUFF REPAIR      HX ORTHOPAEDIC  06/20/13    shoulder manipulation for frozen shoulder    HX ORTHOPAEDIC  8/5/2014    pt states she had injection by ortho in right groin    HX ORTHOPAEDIC  2/16/2016    right shoulder manipulation    HX ORTHOPAEDIC  11/10/2015    right rotator cuff    HX ORTHOPAEDIC  02/25/2019    steroid injection L3 L4    HX ORTHOPAEDIC  11/04/2019    steroid injection- L3 L4    HX TONSILLECTOMY      age 33    NC COLONOSCOPY FLX DX W/COLLJ SPEC WHEN PFRMD  03/28/2008    Dr German Nunes         Family History   Problem Relation Age of Onset    Hypertension Mother     Diabetes Mother     Kidney Disease Mother     Stroke Mother         TIA    Heart Attack Father     Bleeding Prob Father         taking coumadin    Bipolar Disorder Sister     Hypertension Sister     No Known Problems Sister        Social History     Tobacco Use    Smoking status: Never Smoker    Smokeless tobacco: Never Used   Substance Use Topics    Alcohol use: No     Alcohol/week: 0.0 standard drinks        Lab Results   Component Value Date/Time    WBC 6.2 08/17/2020 11:19 AM    HGB 13.4 08/17/2020 11:19 AM    HCT 40.2 08/17/2020 11:19 AM    PLATELET 333 21/13/3308 11:19 AM    MCV 83 08/17/2020 11:19 AM     Lab Results   Component Value Date/Time    Cholesterol, total 129 04/06/2021 11:04 AM    HDL Cholesterol 47 04/06/2021 11:04 AM    LDL, calculated 70 04/06/2021 11:04 AM    LDL, calculated 142 (H) 08/17/2020 11:19 AM    Triglyceride 56 04/06/2021 11:04 AM    CHOL/HDL Ratio 3.9 05/04/2010 04:50 PM     Lab Results   Component Value Date/Time    TSH 1.020 08/02/2017 09:55 AM      Lab Results   Component Value Date/Time    Sodium 141 08/17/2020 11:19 AM    Potassium 4.8 08/17/2020 11:19 AM    Chloride 101 08/17/2020 11:19 AM    CO2 24 08/17/2020 11:19 AM    Anion gap 11 06/12/2014 09:23 PM    Glucose 108 (H) 08/17/2020 11:19 AM    BUN 15 08/17/2020 11:19 AM    Creatinine 0.81 08/17/2020 11:19 AM    BUN/Creatinine ratio 19 08/17/2020 11:19 AM    GFR est AA 90 08/17/2020 11:19 AM    GFR est non-AA 78 08/17/2020 11:19 AM    Calcium 9.6 08/17/2020 11:19 AM    Bilirubin, total 0.7 04/06/2021 11:04 AM    ALT (SGPT) 14 04/06/2021 11:04 AM    Alk. phosphatase 91 04/06/2021 11:04 AM    Protein, total 7.0 04/06/2021 11:04 AM    Albumin 4.6 04/06/2021 11:04 AM    Globulin 3.9 06/12/2014 09:23 PM    A-G Ratio 1.9 08/17/2020 11:19 AM      Lab Results   Component Value Date/Time    Hemoglobin A1c 6.8 (H) 04/06/2021 11:04 AM    Hemoglobin A1c (POC) 6.3 01/15/2020 09:45 AM         Review of Systems   Constitutional: Negative for malaise/fatigue. HENT: Negative for congestion. Eyes: Negative for blurred vision. Respiratory: Negative for cough and shortness of breath. Cardiovascular: Negative for chest pain, palpitations and leg swelling. Gastrointestinal: Negative for abdominal pain, constipation and heartburn. Genitourinary: Negative for dysuria, frequency and urgency. Neurological: Negative for dizziness, tingling and headaches. Endo/Heme/Allergies: Negative for environmental allergies. Psychiatric/Behavioral: Negative for depression. The patient does not have insomnia. Physical Exam  Vitals signs and nursing note reviewed. Constitutional:       Appearance: Normal appearance. She is well-developed.       Comments: /77 (BP 1 Location: Right arm, BP Patient Position: Sitting)   Pulse 72   Temp 97.5 °F (36.4 °C) (Temporal)   Resp 16   Ht 5' 2\" (1.575 m)   Wt 210 lb 3.2 oz (95.3 kg)   LMP 06/14/2004   SpO2 98%   BMI 38.45 kg/m²      HENT:      Right Ear: Tympanic membrane and ear canal normal.      Left Ear: Tympanic membrane and ear canal normal.      Nose: No mucosal edema. Neck:      Musculoskeletal: Normal range of motion and neck supple. Thyroid: No thyromegaly. Cardiovascular:      Rate and Rhythm: Normal rate and regular rhythm. Heart sounds: Normal heart sounds. No gallop. Pulmonary:      Effort: Pulmonary effort is normal.      Breath sounds: Normal breath sounds. Abdominal:      General: Bowel sounds are normal.      Palpations: Abdomen is soft. There is no mass. Tenderness: There is no abdominal tenderness. Musculoskeletal: Normal range of motion. Right lower leg: No edema. Left lower leg: No edema. Lymphadenopathy:      Cervical: No cervical adenopathy. Skin:     General: Skin is warm and dry. ASSESSMENT and PLAN  Diagnoses and all orders for this visit:    1. Essential hypertension  Discussed sodium restriction, high k rich diet, maintaining ideal body weight and regular exercise program such as daily walking 30 min perday 4-5 times per week, as physiologic means to achieve blood pressure control. Medication compliance advised. 2. Type 2 diabetes mellitus without complication, unspecified whether long term insulin use (HCC)  a1c at 6.8%. Continue to monitor. Work on diet and exercise. -     MICROALBUMIN, UR, RAND W/ MICROALB/CREAT RATIO; Future  -     AMB POC URINALYSIS DIP STICK AUTO W/O MICRO  -     glimepiride (AMARYL) 2 mg tablet; TAKE 1 TABLET BY MOUTH ONCE DAILY IN THE MORNING (REPLACING  GLIPIZIDE)    3. Mixed hyperlipidemia  Continue to monitor. Work on diet and exercise. Lipids stable at goal. Continue with crestor. 4. Primary osteoarthritis involving multiple joints  Stable     5.  Encounter for medication monitoring    6. Non morbid obesity  I have reviewed/discussed the above normal BMI with the patient. I have recommended the following interventions: dietary management education, guidance, and counseling . Follow-up and Dispositions    · Return in about 6 months (around 10/13/2021). current treatment plan is effective, no change in therapy  reviewed diet, exercise and weight control  cardiovascular risk and specific lipid/LDL goals reviewed  reviewed medications and side effects in detail  specific diabetic recommendations: low cholesterol diet, weight control and daily exercise discussed, home glucose monitoring emphasized, foot care discussed and Podiatry visits discussed, annual eye examinations at Ophthalmology discussed and glycohemoglobin and other lab monitoring discussed     I have discussed diagnosis listed in this note with pt and/or family. I have discussed treatment plans and options and the risk/benefit analysis of those options, including safe use of medications and possible medication side effects. Through the use of shared decision making we have agreed to the above plan. The patient has received an after-visit summary and questions were answered concerning future plans and follow up. Advise pt of any urgent changes then to proceed to the ER.

## 2021-04-13 NOTE — PROGRESS NOTES
Chief Complaint   Patient presents with    Hypertension     follow up    Cholesterol Problem     follow up    Diabetes     follow up       A1C 4/6/2021    Microalbumin 4/19/2019    Mammogram 10/26/2020    Colonoscopy 4/25/2018 by Dr. Wilcox- repeat in 10 years    Eye exam 8/28/2020 by Dr. Kaylyn Denise. 1. Have you been to the ER, urgent care clinic since your last visit? Hospitalized since your last visit? No    2. Have you seen or consulted any other health care providers outside of the [address] [place] [address] since your last visit? Include any pap smears or colon screening.  No

## 2021-04-16 ENCOUNTER — DOCUMENTATION ONLY (OUTPATIENT)
Dept: FAMILY MEDICINE CLINIC | Age: 66
End: 2021-04-16

## 2021-04-19 ENCOUNTER — DOCUMENTATION ONLY (OUTPATIENT)
Dept: FAMILY MEDICINE CLINIC | Age: 66
End: 2021-04-19

## 2021-04-19 NOTE — PROGRESS NOTES
Received fax from [address] that Janumet XR 50-1,000mg was approved from 4/16/2021 through 4/16/2022.

## 2021-05-04 DIAGNOSIS — F51.01 PRIMARY INSOMNIA: ICD-10-CM

## 2021-05-04 RX ORDER — ALPRAZOLAM 0.5 MG/1
TABLET ORAL
Qty: 30 TAB | Refills: 1 | Status: SHIPPED | OUTPATIENT
Start: 2021-05-04 | End: 2022-06-29 | Stop reason: ALTCHOICE

## 2021-07-09 DIAGNOSIS — E78.2 MIXED HYPERLIPIDEMIA: Primary | ICD-10-CM

## 2021-07-09 RX ORDER — ROSUVASTATIN CALCIUM 10 MG/1
TABLET, COATED ORAL
Qty: 30 TABLET | Refills: 0 | Status: SHIPPED | OUTPATIENT
Start: 2021-07-09 | End: 2022-08-29

## 2021-08-09 DIAGNOSIS — E11.9 TYPE 2 DIABETES MELLITUS WITHOUT COMPLICATION, UNSPECIFIED WHETHER LONG TERM INSULIN USE (HCC): ICD-10-CM

## 2021-08-09 RX ORDER — SITAGLIPTIN AND METFORMIN HYDROCHLORIDE 50; 1000 MG/1; MG/1
TABLET, FILM COATED, EXTENDED RELEASE ORAL
Qty: 60 TABLET | Refills: 11 | Status: SHIPPED | OUTPATIENT
Start: 2021-08-09 | End: 2021-10-20 | Stop reason: SDUPTHER

## 2021-08-30 ENCOUNTER — TRANSCRIBE ORDER (OUTPATIENT)
Dept: SCHEDULING | Age: 66
End: 2021-08-30

## 2021-08-30 DIAGNOSIS — Z12.31 SCREENING MAMMOGRAM FOR HIGH-RISK PATIENT: Primary | ICD-10-CM

## 2021-10-20 ENCOUNTER — OFFICE VISIT (OUTPATIENT)
Dept: FAMILY MEDICINE CLINIC | Age: 66
End: 2021-10-20
Payer: COMMERCIAL

## 2021-10-20 VITALS
BODY MASS INDEX: 38.24 KG/M2 | HEIGHT: 62 IN | HEART RATE: 78 BPM | SYSTOLIC BLOOD PRESSURE: 131 MMHG | DIASTOLIC BLOOD PRESSURE: 77 MMHG | TEMPERATURE: 97.5 F | WEIGHT: 207.8 LBS | RESPIRATION RATE: 18 BRPM | OXYGEN SATURATION: 100 %

## 2021-10-20 DIAGNOSIS — E11.42 TYPE 2 DIABETES MELLITUS WITH DIABETIC POLYNEUROPATHY, WITH LONG-TERM CURRENT USE OF INSULIN (HCC): ICD-10-CM

## 2021-10-20 DIAGNOSIS — Z79.4 TYPE 2 DIABETES MELLITUS WITH DIABETIC POLYNEUROPATHY, WITH LONG-TERM CURRENT USE OF INSULIN (HCC): ICD-10-CM

## 2021-10-20 DIAGNOSIS — M15.9 PRIMARY OSTEOARTHRITIS INVOLVING MULTIPLE JOINTS: ICD-10-CM

## 2021-10-20 DIAGNOSIS — E78.2 MIXED HYPERLIPIDEMIA: ICD-10-CM

## 2021-10-20 DIAGNOSIS — I10 ESSENTIAL HYPERTENSION: Primary | ICD-10-CM

## 2021-10-20 DIAGNOSIS — Z51.81 ENCOUNTER FOR MEDICATION MONITORING: ICD-10-CM

## 2021-10-20 DIAGNOSIS — E66.9 NON MORBID OBESITY: ICD-10-CM

## 2021-10-20 LAB
ALBUMIN SERPL-MCNC: 4.1 G/DL (ref 3.5–5)
ALBUMIN/GLOB SERPL: 1.2 {RATIO} (ref 1.1–2.2)
ALP SERPL-CCNC: 99 U/L (ref 45–117)
ALT SERPL-CCNC: 26 U/L (ref 12–78)
ANION GAP SERPL CALC-SCNC: 6 MMOL/L (ref 5–15)
AST SERPL-CCNC: 22 U/L (ref 15–37)
BILIRUB SERPL-MCNC: 1.1 MG/DL (ref 0.2–1)
BUN SERPL-MCNC: 14 MG/DL (ref 6–20)
BUN/CREAT SERPL: 17 (ref 12–20)
CALCIUM SERPL-MCNC: 9.9 MG/DL (ref 8.5–10.1)
CHLORIDE SERPL-SCNC: 101 MMOL/L (ref 97–108)
CHOLEST SERPL-MCNC: 150 MG/DL
CO2 SERPL-SCNC: 30 MMOL/L (ref 21–32)
CREAT SERPL-MCNC: 0.83 MG/DL (ref 0.55–1.02)
ERYTHROCYTE [DISTWIDTH] IN BLOOD BY AUTOMATED COUNT: 14.7 % (ref 11.5–14.5)
GLOBULIN SER CALC-MCNC: 3.4 G/DL (ref 2–4)
GLUCOSE POC: 134 MG/DL
GLUCOSE SERPL-MCNC: 120 MG/DL (ref 65–100)
HBA1C MFR BLD HPLC: 6.9 %
HCT VFR BLD AUTO: 41.5 % (ref 35–47)
HDLC SERPL-MCNC: 48 MG/DL
HDLC SERPL: 3.1 {RATIO} (ref 0–5)
HGB BLD-MCNC: 13.4 G/DL (ref 11.5–16)
LDLC SERPL CALC-MCNC: 86.8 MG/DL (ref 0–100)
MCH RBC QN AUTO: 27.5 PG (ref 26–34)
MCHC RBC AUTO-ENTMCNC: 32.3 G/DL (ref 30–36.5)
MCV RBC AUTO: 85 FL (ref 80–99)
NRBC # BLD: 0 K/UL (ref 0–0.01)
NRBC BLD-RTO: 0 PER 100 WBC
PLATELET # BLD AUTO: 248 K/UL (ref 150–400)
PMV BLD AUTO: 10.1 FL (ref 8.9–12.9)
POTASSIUM SERPL-SCNC: 4 MMOL/L (ref 3.5–5.1)
PROT SERPL-MCNC: 7.5 G/DL (ref 6.4–8.2)
RBC # BLD AUTO: 4.88 M/UL (ref 3.8–5.2)
SODIUM SERPL-SCNC: 137 MMOL/L (ref 136–145)
TRIGL SERPL-MCNC: 76 MG/DL (ref ?–150)
VLDLC SERPL CALC-MCNC: 15.2 MG/DL
WBC # BLD AUTO: 6.6 K/UL (ref 3.6–11)

## 2021-10-20 PROCEDURE — 82947 ASSAY GLUCOSE BLOOD QUANT: CPT | Performed by: FAMILY MEDICINE

## 2021-10-20 PROCEDURE — 99214 OFFICE O/P EST MOD 30 MIN: CPT | Performed by: FAMILY MEDICINE

## 2021-10-20 PROCEDURE — 83036 HEMOGLOBIN GLYCOSYLATED A1C: CPT | Performed by: FAMILY MEDICINE

## 2021-10-20 RX ORDER — SITAGLIPTIN AND METFORMIN HYDROCHLORIDE 50; 1000 MG/1; MG/1
2 TABLET, FILM COATED, EXTENDED RELEASE ORAL DAILY
Qty: 60 TABLET | Refills: 11
Start: 2021-10-20 | End: 2022-09-06

## 2021-10-20 RX ORDER — GABAPENTIN 100 MG/1
CAPSULE ORAL
COMMUNITY
Start: 2021-08-17 | End: 2022-06-29 | Stop reason: DRUGHIGH

## 2021-10-20 RX ORDER — CYCLOBENZAPRINE HCL 10 MG
10 TABLET ORAL
COMMUNITY
Start: 2021-08-17

## 2021-10-20 RX ORDER — CELECOXIB 200 MG/1
200 CAPSULE ORAL DAILY
COMMUNITY
Start: 2021-09-13 | End: 2022-02-04 | Stop reason: SDUPTHER

## 2021-10-20 RX ORDER — CHOLECALCIFEROL (VITAMIN D3) 125 MCG
100 CAPSULE ORAL EVERY EVENING
COMMUNITY

## 2021-10-20 NOTE — PROGRESS NOTES
Chief Complaint   Patient presents with    Hypertension     follow up    Cholesterol Problem     follow up    Diabetes     follow up           Microalbumin 4/13/2021    Mammogram 10/26/2020    Colonoscopy 4/25/2018 by Dr. Robert Mckeon- repeat in 10 years    Eye exam 8/30/2021 by Dr. Maria G Smith. Patient bought in eye exam report. 1. Have you been to the ER, urgent care clinic since your last visit? Hospitalized since your last visit? No    2. Have you seen or consulted any other health care providers outside of the [address] [place] [address] since your last visit? Include any pap smears or colon screening.  No

## 2021-10-20 NOTE — PROGRESS NOTES
HISTORY OF PRESENT ILLNESS  Jackie Don is a 61 y.o. female. HPI   Follow up on chronic medical problems. Overall feeling well. Pt is retired and her mother lives with her whom she is her caregiver. Doing the precautionary measures at home to reduce risks of exposure COVID19. Also wearing mask when she is going out. No known sick contacts or known exposure to [address]. HTN follow up:  Compliant w/ meds, low salt diet, and exercise. No home bp monitoring. No swelling, headache or dizziness. No chest pain, SOB, palpitations. Otherwise feeling well since the last visit. DM type II follow up:  Compliant w/ meds, diabetic diet, and exercise. Obtains home glucose monitoring averaging 100-140. Checks BS daily on most days and prn. Pt does not have BS log at visit today. No Rf needed for today. Denies any tingling sensation, polyuria and polydipsia. No blurred vision. No significant weight changes. Feeling well since last OV. Hypercholesterolemia follow up:  Compliant w/ low fat, low cholesterol diet. Exercising some. Off of Lipitor d/t muscle aching. We discussed restarting with another statin pending her results from today. Osteoarthritis:  Patient has osteoarthritis. Has migratory joint aching and stiffness that comes and goes. Symptoms onset: problem is longstanding. Rheumatological ROS: stable, mild-to-moderate joint symptoms intermittently, reasonably well controlled by PRN meds. Response to treatment plan: stable and intermittent. HM:  Mammogram 10/26/2020 (appt for 10/28)  Colonoscopy 4/25/2018 by Dr. Miguel Weeks- repeat in [address] exam [id number] by Dr. Bayron Lam.     Patient Active Problem List   Diagnosis Code    DJD (degenerative joint disease) back M19.90    Hypokalemia E87.6    History of hysterectomy, supracervical Z90.711    Mixed hyperlipidemia E78.2    Rotator cuff tear M75.100    Unspecified vitamin D deficiency E55.9    Essential hypertension I10    Type 2 diabetes mellitus without complication ([facility]) N77.4    Other osteoarthritis of spine, lumbar region M47.896    Encounter for medication monitoring Z51.81    Rotator cuff arthropathy M12.819    S/P rotator cuff repair Z98.890    Severe obesity (BMI 35.0-39. 9) with comorbidity ([facility]) E66.01    Type 2 diabetes mellitus with diabetic neuropathy ([facility]) E11.40       Current Outpatient Medications   Medication Sig Dispense Refill    Janumet XR 50-1,000 mg TM24 TAKE 1 TABLET BY MOUTH TWICE DAILY WITH MEALS 60 Tablet 11    rosuvastatin (CRESTOR) 10 mg tablet TAKE 1 TABLET BY MOUTH EVERY EVENING 30 Tablet 0    ALPRAZolam (XANAX) 0.5 mg tablet TAKE 1 TABLET BY MOUTH EVERY NIGHT AT BEDTIME AS NEEDED FOR SLEEP 30 Tab 1    glimepiride (AMARYL) 2 mg tablet TAKE 1 TABLET BY MOUTH ONCE DAILY IN THE MORNING (REPLACING  GLIPIZIDE) 90 Tab 3    valsartan-hydroCHLOROthiazide (DIOVAN-HCT) 320-25 mg per tablet Take 1 Tab by mouth daily. 30 Tab 11    cholecalciferol, vitamin D3, (Vitamin D3) 50 mcg (2,000 unit) tab Take 1 Tab by mouth daily.  acetaminophen (TYLENOL EXTRA STRENGTH) 500 mg tablet Take 500 mg by mouth every six (6) hours as needed for Pain.  cyanocobalamin (VITAMIN B-12) 1,000 mcg tablet Take 1,000 mcg by mouth daily.  glucose blood VI test strips (ACCU-CHEK SMARTVIEW TEST STRIP) strip Use to check blood sugar twice daily and prn. 200 Strip 3    Lancets (ACCU-CHEK FASTCLIX) misc Use to obtain blood sample for glucose testing daily and prn. 100 Each 3    aspirin 81 mg tablet Take 81 mg by mouth daily.          Allergies   Allergen Reactions    Latex Rash    Codeine Nausea and Vomiting    Keflex [Cephalexin] Nausea and Vomiting    Lipitor [Atorvastatin] Myalgia    Oxycodone Nausea and Vomiting    Percocet [Oxycodone-Acetaminophen] Nausea and Vomiting    Tessalon [Benzonatate] Hives and Itching    Tramadol Other (comments) and Nausea and Vomiting     dizziness    Vicodin [Hydrocodone-Acetaminophen] Nausea and Vomiting       Past Medical History:   Diagnosis Date    DJD (degenerative joint disease) back 4/10/2010    and neck    DM (diabetes mellitus) ([facility] [address] 4/10/2010    Elevated cholesterol 4/10/2010    HTN (hypertension) 4/10/2010    Hypokalemia 4/10/2010    Unspecified vitamin D deficiency        Past Surgical History:   Procedure Laterality Date    HX HYSTERECTOMY  6/14/04    [address]    HX MOHS PROCEDURES  11/10/2015    right shoulder    HX ORTHOPAEDIC  4/29/13    LEFT ROTATOR CUFF REPAIR      HX ORTHOPAEDIC  06/20/13    shoulder manipulation for frozen shoulder    HX ORTHOPAEDIC  8/5/2014    pt states she had injection by ortho in right groin    HX ORTHOPAEDIC  2/16/2016    right shoulder manipulation    HX ORTHOPAEDIC  11/10/2015    right rotator cuff    HX ORTHOPAEDIC  02/25/2019    steroid injection L3 L4    HX ORTHOPAEDIC  11/04/2019    steroid injection- L3 L4    HX TONSILLECTOMY      age 33    [facility] COLONOSCOPY FLX DX W/COLLJ SPEC WHEN PFRMD  03/28/2008    Dr Willian Hassan       Family History   Problem Relation Age of Onset    Hypertension Mother     Diabetes Mother     Kidney Disease Mother     Stroke Mother         TIA    Heart Attack Father     Bleeding Prob Father         taking coumadin    Bipolar Disorder Sister     Hypertension Sister     No Known Problems Sister        Social History     Tobacco Use    Smoking status: Never Smoker    Smokeless tobacco: Never Used   Substance Use Topics    Alcohol use: No     Alcohol/week: 0.0 standard drinks        Lab Results   Component Value Date/Time    WBC 6.2 08/17/2020 11:19 AM    HGB 13.4 08/17/2020 11:19 AM    HCT 40.2 08/17/2020 11:19 AM    PLATELET 387 10/31/7627 11:19 AM    MCV 83 08/17/2020 11:19 AM     Lab Results   Component Value Date/Time    Cholesterol, total 129 04/06/2021 11:04 AM    HDL Cholesterol 47 04/06/2021 11:04 AM    LDL, calculated 70 04/06/2021 11:04 AM    LDL, calculated 142 (H) 08/17/2020 11:19 AM    Triglyceride 56 04/06/2021 11:04 AM    CHOL/HDL Ratio 3.9 05/04/2010 04:50 PM     Lab Results   Component Value Date/Time    TSH 1.020 08/02/2017 09:55 AM      Lab Results   Component Value Date/Time    Sodium 141 08/17/2020 11:19 AM    Potassium 4.8 08/17/2020 11:19 AM    Chloride 101 08/17/2020 11:19 AM    CO2 24 08/17/2020 11:19 AM    Anion gap 11 06/12/2014 09:23 PM    Glucose 108 (H) 08/17/2020 11:19 AM    BUN 15 08/17/2020 11:19 AM    Creatinine 0.81 08/17/2020 11:19 AM    BUN/Creatinine ratio 19 08/17/2020 11:19 AM    GFR est AA 90 08/17/2020 11:19 AM    GFR est non-AA 78 08/17/2020 11:19 AM    Calcium 9.6 08/17/2020 11:19 AM    Bilirubin, total 0.7 04/06/2021 11:04 AM    ALT (SGPT) 14 04/06/2021 11:04 AM    Alk. phosphatase 91 04/06/2021 11:04 AM    Protein, total 7.0 04/06/2021 11:04 AM    Albumin 4.6 04/06/2021 11:04 AM    Globulin 3.9 06/12/2014 09:23 PM    A-G Ratio 1.9 08/17/2020 11:19 AM      Lab Results   Component Value Date/Time    Hemoglobin A1c 6.8 (H) 04/06/2021 11:04 AM    Hemoglobin A1c (POC) 6.3 01/15/2020 09:45 AM         Review of Systems   Constitutional: Negative for malaise/fatigue. HENT: Negative for congestion. Eyes: Negative for blurred vision. Respiratory: Negative for cough and shortness of breath. Cardiovascular: Negative for chest pain, palpitations and leg swelling. Gastrointestinal: Negative for abdominal pain, constipation and heartburn. Genitourinary: Negative for dysuria, frequency and urgency. Neurological: Negative for dizziness, tingling and headaches. Endo/Heme/Allergies: Negative for environmental allergies. Psychiatric/Behavioral: Negative for depression. The patient does not have insomnia. Physical Exam  Vitals and nursing note reviewed. Constitutional:       Appearance: Normal appearance. She is well-developed.       Comments: /77 (BP 1 Location: Right arm, BP Patient Position: Sitting)   Pulse 78   Temp 97.5 °F (36.4 °C) (Oral)   Resp 18   Ht 5' 2\" (1.575 m)   Wt 207 lb 12.8 oz (94.3 kg)   LMP 06/14/2004   SpO2 100%   BMI 38.01 kg/m²    HENT:      Right Ear: Tympanic membrane and ear canal normal.      Left Ear: Tympanic membrane and ear canal normal.      Nose: No mucosal edema. Neck:      Thyroid: No thyromegaly. Cardiovascular:      Rate and Rhythm: Normal rate and regular rhythm. Heart sounds: Normal heart sounds. No gallop. Pulmonary:      Effort: Pulmonary effort is normal.      Breath sounds: Normal breath sounds. Abdominal:      General: Bowel sounds are normal.      Palpations: Abdomen is soft. There is no mass. Tenderness: There is no abdominal tenderness. Musculoskeletal:         General: Normal range of motion. Cervical back: Normal range of motion and neck supple. Right lower leg: No edema. Left lower leg: No edema. Lymphadenopathy:      Cervical: No cervical adenopathy. Skin:     General: Skin is warm and dry. Neurological:      General: No focal deficit present. Mental Status: She is alert and oriented to person, place, and time. Psychiatric:         Mood and Affect: Mood normal.         ASSESSMENT and PLAN  Diagnoses and all orders for this visit:    1. Essential hypertension  Discussed sodium restriction, high k rich diet, maintaining ideal body weight and regular exercise program such as daily walking 30 min perday 4-5 times per week, as physiologic means to achieve blood pressure control. Medication compliance advised. 2. Type 2 diabetes mellitus with diabetic polyneuropathy, with long-term current use of insulin ([facility])  a1c 6.9%. Continue to monitor. Work on diet and exercise. -     AMB POC HEMOGLOBIN A1C  -     AMB POC GLUCOSE, QUANTITATIVE, BLOOD  -     SITagliptin-metFORMIN (Janumet XR) 50-1,000 mg TM24; Take 2 Tablets by mouth daily. 3. Mixed hyperlipidemia  -     LIPID PANEL; Future    4.  Primary osteoarthritis involving multiple joints  Overall has been stable     5. Encounter for medication monitoring  -     METABOLIC PANEL, COMPREHENSIVE; Future  -     CBC W/O DIFF; Future    6. Non morbid obesity  I have reviewed/discussed the above normal BMI with the patient. I have recommended the following interventions: dietary management education, guidance, and counseling . Follow-up and Dispositions    · Return in about 3 months (around 1/20/2022). reviewed diet, exercise and weight control  cardiovascular risk and specific lipid/LDL goals reviewed  reviewed medications and side effects in detail  specific diabetic recommendations: low cholesterol diet, weight control and daily exercise discussed, home glucose monitoring emphasized, all medications, side effects and compliance discussed carefully, foot care discussed and Podiatry visits discussed, annual eye examinations at Ophthalmology discussed and glycohemoglobin and other lab monitoring discussed     I have discussed diagnosis listed in this note with pt and/or family. I have discussed treatment plans and options and the risk/benefit analysis of those options, including safe use of medications and possible medication side effects. Through the use of shared decision making we have agreed to the above plan. The patient has received an after-visit summary and questions were answered concerning future plans and follow up. Advise pt of any urgent changes then to proceed to the ER.

## 2021-10-28 ENCOUNTER — HOSPITAL ENCOUNTER (OUTPATIENT)
Dept: MAMMOGRAPHY | Age: 66
Discharge: HOME | End: 2021-10-28
Attending: FAMILY MEDICINE
Payer: COMMERCIAL

## 2021-10-28 DIAGNOSIS — Z12.31 SCREENING MAMMOGRAM FOR HIGH-RISK PATIENT: ICD-10-CM

## 2021-10-28 PROCEDURE — 77067 SCR MAMMO BI INCL CAD: CPT

## 2021-12-07 DIAGNOSIS — I10 HTN (HYPERTENSION): ICD-10-CM

## 2021-12-07 RX ORDER — VALSARTAN AND HYDROCHLOROTHIAZIDE 320; 25 MG/1; MG/1
TABLET, FILM COATED ORAL
Qty: 30 TABLET | Refills: 11 | Status: SHIPPED | OUTPATIENT
Start: 2021-12-07 | End: 2022-02-04 | Stop reason: DRUGHIGH

## 2021-12-07 RX ORDER — VALSARTAN AND HYDROCHLOROTHIAZIDE 320; 25 MG/1; MG/1
TABLET, FILM COATED ORAL
Qty: 30 TABLET | Refills: 11 | Status: SHIPPED | OUTPATIENT
Start: 2021-12-07 | End: 2022-10-12 | Stop reason: SDUPTHER

## 2022-02-04 ENCOUNTER — OFFICE VISIT (OUTPATIENT)
Dept: FAMILY MEDICINE CLINIC | Age: 67
End: 2022-02-04
Payer: COMMERCIAL

## 2022-02-04 VITALS
OXYGEN SATURATION: 96 % | HEART RATE: 95 BPM | RESPIRATION RATE: 18 BRPM | WEIGHT: 204 LBS | SYSTOLIC BLOOD PRESSURE: 138 MMHG | TEMPERATURE: 98.1 F | BODY MASS INDEX: 37.54 KG/M2 | DIASTOLIC BLOOD PRESSURE: 72 MMHG | HEIGHT: 62 IN

## 2022-02-04 DIAGNOSIS — M15.9 PRIMARY OSTEOARTHRITIS INVOLVING MULTIPLE JOINTS: ICD-10-CM

## 2022-02-04 DIAGNOSIS — E11.42 TYPE 2 DIABETES MELLITUS WITH DIABETIC POLYNEUROPATHY, WITH LONG-TERM CURRENT USE OF INSULIN (HCC): ICD-10-CM

## 2022-02-04 DIAGNOSIS — E66.9 NON MORBID OBESITY: ICD-10-CM

## 2022-02-04 DIAGNOSIS — Z51.81 ENCOUNTER FOR MEDICATION MONITORING: ICD-10-CM

## 2022-02-04 DIAGNOSIS — I10 ESSENTIAL HYPERTENSION: Primary | ICD-10-CM

## 2022-02-04 DIAGNOSIS — Z79.4 TYPE 2 DIABETES MELLITUS WITH DIABETIC POLYNEUROPATHY, WITH LONG-TERM CURRENT USE OF INSULIN (HCC): ICD-10-CM

## 2022-02-04 DIAGNOSIS — E78.2 MIXED HYPERLIPIDEMIA: ICD-10-CM

## 2022-02-04 DIAGNOSIS — M54.12 CERVICAL RADICULITIS: ICD-10-CM

## 2022-02-04 LAB
GLUCOSE POC: 95 MG/DL
HBA1C MFR BLD HPLC: 6.4 %

## 2022-02-04 PROCEDURE — 99214 OFFICE O/P EST MOD 30 MIN: CPT | Performed by: FAMILY MEDICINE

## 2022-02-04 PROCEDURE — 83036 HEMOGLOBIN GLYCOSYLATED A1C: CPT | Performed by: FAMILY MEDICINE

## 2022-02-04 PROCEDURE — 82947 ASSAY GLUCOSE BLOOD QUANT: CPT | Performed by: FAMILY MEDICINE

## 2022-02-04 RX ORDER — CELECOXIB 200 MG/1
200 CAPSULE ORAL
Qty: 30 CAPSULE | Refills: 1 | Status: SHIPPED | OUTPATIENT
Start: 2022-02-04 | End: 2022-06-29 | Stop reason: DRUGHIGH

## 2022-02-04 RX ORDER — PREDNISONE 20 MG/1
20 TABLET ORAL 2 TIMES DAILY
Qty: 10 TABLET | Refills: 0 | Status: SHIPPED | OUTPATIENT
Start: 2022-02-04 | End: 2022-02-09

## 2022-02-04 NOTE — PROGRESS NOTES
Chief Complaint   Patient presents with    Follow-up     3m fu        1. Have you been to the ER, urgent care clinic since your last visit? Hospitalized since your last visit? No    2. Have you seen or consulted any other health care providers outside of the [address] [place] [address] since your last visit? Include any pap smears or colon screening.  No

## 2022-02-04 NOTE — PROGRESS NOTES
HISTORY OF PRESENT ILLNESS  Nicole Vanegas is a 61 y.o. female. Follow up on chronic medical problems. Overall feeling well. Pt is retired and her mother lives with her whom she is her caregiver. Doing the precautionary measures at home to reduce risks of exposure COVID19. Also wearing mask when she is going out. No known sick contacts or known exposure to [address]. HTN follow up:  Compliant w/ meds, low salt diet, and exercise. No home bp monitoring. No swelling, headache or dizziness. No chest pain, SOB, palpitations. Otherwise feeling well since the last visit. DM type II follow up:  Compliant w/ meds, diabetic diet, and exercise. Obtains home glucose monitoring averaging 100-140. Checks BS daily on most days and prn. Pt does not have BS log at visit today. No Rf needed for today. Denies any tingling sensation, polyuria and polydipsia. No blurred vision. No significant weight changes. Feeling well since last OV. Hypercholesterolemia follow up:  Compliant w/ low fat, low cholesterol diet. Exercising some. Off of Lipitor d/t muscle aching. We discussed restarting with another statin pending her results from today. Osteoarthritis:  Patient has osteoarthritis. Has migratory joint aching and stiffness that comes and goes. Has been seeing ortho for \"pinche nerve in the neck\"  Has pain in the left neck which radiates down her arm. Wanted to do FLAVIO but pt declined d/t \"not wanting anybody going into her neck\". Pain has been more severe in the past few weeks. Increase pain at night. She has been taking motrin for the pain. Pain is 7-8/10. She has been using ice which she prefers over heat. Symptoms onset: problem is longstanding. Rheumatological ROS: stable, mild-to-moderate joint symptoms intermittently, reasonably well controlled by PRN meds. Response to treatment plan: stable and intermittent.    HM:  Mammogram 10/26/2020 (appt for 10/28)  Colonoscopy 4/25/2018 by Dr. Eulalia Rasheed- repeat in [address] exam 4?72/1573 by Dr. Qian Byrne. Patient Active Problem List   Diagnosis Code    DJD (degenerative joint disease) back M19.90    Hypokalemia E87.6    History of hysterectomy, supracervical Z90.711    Mixed hyperlipidemia E78.2    Rotator cuff tear M75.100    Unspecified vitamin D deficiency E55.9    Essential hypertension I10    Type 2 diabetes mellitus without complication ([facility] P94.6    Other osteoarthritis of spine, lumbar region M47.896    Encounter for medication monitoring Z51.81    Rotator cuff arthropathy M12.819    S/P rotator cuff repair Z98.890    Severe obesity (BMI 35.0-39. 9) with comorbidity ([facility]) E66.01    Type 2 diabetes mellitus with diabetic neuropathy ([facility]) E11.40       Current Outpatient Medications   Medication Sig Dispense Refill    valsartan-hydroCHLOROthiazide (DIOVAN-HCT) 320-25 mg per tablet TAKE 1 TABLET BY MOUTH DAILY 30 Tablet 11    gabapentin (NEURONTIN) 100 mg capsule Take 1 cap every morning and 2 caps every evening      cyclobenzaprine (FLEXERIL) 10 mg tablet Take 10 mg by mouth three (3) times daily as needed.  co-enzyme Q-10 (Co Q-10) 100 mg capsule Take 100 mg by mouth every evening.  SITagliptin-metFORMIN (Janumet XR) 50-1,000 mg TM24 Take 2 Tablets by mouth daily. 60 Tablet 11    rosuvastatin (CRESTOR) 10 mg tablet TAKE 1 TABLET BY MOUTH EVERY EVENING 30 Tablet 0    ALPRAZolam (XANAX) 0.5 mg tablet TAKE 1 TABLET BY MOUTH EVERY NIGHT AT BEDTIME AS NEEDED FOR SLEEP 30 Tab 1    glimepiride (AMARYL) 2 mg tablet TAKE 1 TABLET BY MOUTH ONCE DAILY IN THE MORNING (REPLACING  GLIPIZIDE) 90 Tab 3    cholecalciferol, vitamin D3, (Vitamin D3) 50 mcg (2,000 unit) tab Take 1 Tab by mouth daily.  acetaminophen (TYLENOL EXTRA STRENGTH) 500 mg tablet Take 500 mg by mouth every six (6) hours as needed for Pain.  cyanocobalamin (VITAMIN B-12) 1,000 mcg tablet Take 1,000 mcg by mouth daily.       glucose blood VI test strips (ACCU-CHEK SMARTVIEW TEST STRIP) strip Use to check blood sugar twice daily and prn. 200 Strip 3    Lancets (ACCU-CHEK FASTCLIX) misc Use to obtain blood sample for glucose testing daily and prn. 100 Each 3    aspirin 81 mg tablet Take 81 mg by mouth daily.  celecoxib (CELEBREX) 200 mg capsule Take 200 mg by mouth daily.  (Patient not taking: Reported on 2/4/2022)         Allergies   Allergen Reactions    Latex Rash    Codeine Nausea and Vomiting    Keflex [Cephalexin] Nausea and Vomiting    Lipitor [Atorvastatin] Myalgia    Oxycodone Nausea and Vomiting    Percocet [Oxycodone-Acetaminophen] Nausea and Vomiting    Tessalon [Benzonatate] Hives and Itching    Tramadol Other (comments) and Nausea and Vomiting     dizziness    Vicodin [Hydrocodone-Acetaminophen] Nausea and Vomiting         Past Medical History:   Diagnosis Date    DJD (degenerative joint disease) back 4/10/2010    and neck    DM (diabetes mellitus) ([facility] [address] 4/10/2010    Elevated cholesterol 4/10/2010    HTN (hypertension) 4/10/2010    Hypokalemia 4/10/2010    Unspecified vitamin D deficiency          Past Surgical History:   Procedure Laterality Date    HX HYSTERECTOMY  6/14/04    [address]    HX MOHS PROCEDURES  11/10/2015    right shoulder    HX ORTHOPAEDIC  4/29/13    LEFT ROTATOR CUFF REPAIR      HX ORTHOPAEDIC  06/20/13    shoulder manipulation for frozen shoulder    HX ORTHOPAEDIC  8/5/2014    pt states she had injection by ortho in right groin    HX ORTHOPAEDIC  2/16/2016    right shoulder manipulation    HX ORTHOPAEDIC  11/10/2015    right rotator cuff    HX ORTHOPAEDIC  02/25/2019    steroid injection L3 L4    HX ORTHOPAEDIC  11/04/2019    steroid injection- L3 L4    HX TONSILLECTOMY      age 31    ID COLONOSCOPY FLX DX W/COLLJ SPEC WHEN PFRMD  03/28/2008    Dr Essence Santana         Family History   Problem Relation Age of Onset    Hypertension Mother     Diabetes Mother     Kidney Disease Mother     Stroke Mother TIA    Heart Attack Father     Bleeding Prob Father         taking coumadin    Bipolar Disorder Sister     Hypertension Sister     No Known Problems Sister        Social History     Tobacco Use    Smoking status: Never Smoker    Smokeless tobacco: Never Used   Substance Use Topics    Alcohol use: No     Alcohol/week: 0.0 standard drinks        Lab Results   Component Value Date/Time    WBC 6.6 10/20/2021 11:10 AM    HGB 13.4 10/20/2021 11:10 AM    HCT 41.5 10/20/2021 11:10 AM    PLATELET 968 94/91/7170 11:10 AM    MCV 85.0 10/20/2021 11:10 AM     Lab Results   Component Value Date/Time    Cholesterol, total 150 10/20/2021 11:10 AM    HDL Cholesterol 48 10/20/2021 11:10 AM    LDL, calculated 86.8 10/20/2021 11:10 AM    Triglyceride 76 10/20/2021 11:10 AM    CHOL/HDL Ratio 3.1 10/20/2021 11:10 AM     Lab Results   Component Value Date/Time    TSH 1.020 08/02/2017 09:55 AM      Lab Results   Component Value Date/Time    Sodium 137 10/20/2021 11:10 AM    Potassium 4.0 10/20/2021 11:10 AM    Chloride 101 10/20/2021 11:10 AM    CO2 30 10/20/2021 11:10 AM    Anion gap 6 10/20/2021 11:10 AM    Glucose 120 (H) 10/20/2021 11:10 AM    BUN 14 10/20/2021 11:10 AM    Creatinine 0.83 10/20/2021 11:10 AM    BUN/Creatinine ratio 17 10/20/2021 11:10 AM    GFR est AA >60 10/20/2021 11:10 AM    GFR est non-AA >60 10/20/2021 11:10 AM    Calcium 9.9 10/20/2021 11:10 AM    Bilirubin, total 1.1 (H) 10/20/2021 11:10 AM    ALT (SGPT) 26 10/20/2021 11:10 AM    Alk. phosphatase 99 10/20/2021 11:10 AM    Protein, total 7.5 10/20/2021 11:10 AM    Albumin 4.1 10/20/2021 11:10 AM    Globulin 3.4 10/20/2021 11:10 AM    A-G Ratio 1.2 10/20/2021 11:10 AM      Lab Results   Component Value Date/Time    Hemoglobin A1c 6.8 (H) 04/06/2021 11:04 AM    Hemoglobin A1c (POC) 6.9 10/20/2021 11:14 AM         Review of Systems   Constitutional: Negative for malaise/fatigue. HENT: Negative for congestion. Eyes: Negative for blurred vision. Respiratory: Negative for cough and shortness of breath. Cardiovascular: Negative for chest pain, palpitations and leg swelling. Gastrointestinal: Negative for abdominal pain, constipation and heartburn. Genitourinary: Negative for dysuria, frequency and urgency. Neurological: Negative for dizziness, tingling and headaches. Endo/Heme/Allergies: Negative for environmental allergies. Psychiatric/Behavioral: Negative for depression. The patient does not have insomnia. Physical Exam  Vitals and nursing note reviewed. Constitutional:       Appearance: Normal appearance. She is well-developed. Comments: /72   Pulse 95   Temp 98.1 °F (36.7 °C) (Oral)   Resp 18   Ht 5' 2\" (1.575 m)   Wt 204 lb (92.5 kg)   LMP 06/14/2004   SpO2 96%   BMI 37.31 kg/m²      HENT:      Right Ear: Tympanic membrane and ear canal normal.      Left Ear: Tympanic membrane and ear canal normal.      Nose: No mucosal edema. Neck:      Thyroid: No thyromegaly. Cardiovascular:      Rate and Rhythm: Normal rate and regular rhythm. Heart sounds: Normal heart sounds. No gallop. Pulmonary:      Effort: Pulmonary effort is normal.      Breath sounds: Normal breath sounds. Abdominal:      General: Bowel sounds are normal.      Palpations: Abdomen is soft. There is no mass. Tenderness: There is no abdominal tenderness. Musculoskeletal:         General: Normal range of motion. Left shoulder: Tenderness (left posterior shoulder tenderness. ) present. No bony tenderness. Normal range of motion. Normal strength. Normal pulse. Cervical back: Normal range of motion and neck supple. Pain with movement present. No spinous process tenderness or muscular tenderness. Normal range of motion. Right lower leg: No edema. Left lower leg: No edema. Lymphadenopathy:      Cervical: No cervical adenopathy. Skin:     General: Skin is warm and dry.    Neurological:      General: No focal deficit present. Mental Status: She is alert and oriented to person, place, and time. Psychiatric:         Mood and Affect: Mood normal.       ASSESSMENT and PLAN  Diagnoses and all orders for this visit:    1. Essential hypertension  Discussed sodium restriction, high k rich diet, maintaining ideal body weight and regular exercise program such as daily walking 30 min perday 4-5 times per week, as physiologic means to achieve blood pressure control. Medication compliance advised. 2. Type 2 diabetes mellitus with diabetic polyneuropathy, with long-term current use of insulin ([facility])  a1c 6.4%. Continue to monitor. Work on diet and exercise. -     AMB POC HEMOGLOBIN A1C  -     AMB POC GLUCOSE, QUANTITATIVE, BLOOD    3. Mixed hyperlipidemia  Stable at goal.      4. Primary osteoarthritis involving multiple joints  Stable     5. Cervical radiculitis  -     predniSONE (DELTASONE) 20 mg tablet; Take 20 mg by mouth two (2) times a day for 5 days. -     celecoxib (CELEBREX) 200 mg capsule; Take 1 Capsule by mouth daily as needed for Pain. (do not take while taking the prednisone)  Instructions for exercises given and reviewed with pt. Pt also to use ice to the area 3-4 times a day over the next several days until sx are improved. 6. Encounter for medication monitoring  -     METABOLIC PANEL, BASIC; Future    7. Non morbid obesity  I have reviewed/discussed the above normal BMI with the patient. I have recommended the following interventions: dietary management education, guidance, and counseling . Follow-up and Dispositions    · Return in about 5 months (around 7/4/2022).        reviewed diet, exercise and weight control  cardiovascular risk and specific lipid/LDL goals reviewed  reviewed medications and side effects in detail  specific diabetic recommendations: low cholesterol diet, weight control and daily exercise discussed, all medications, side effects and compliance discussed carefully, foot care discussed and Podiatry visits discussed, annual eye examinations at Ophthalmology discussed and glycohemoglobin and other lab monitoring discussed     I have discussed diagnosis listed in this note with pt and/or family. I have discussed treatment plans and options and the risk/benefit analysis of those options, including safe use of medications and possible medication side effects. Through the use of shared decision making we have agreed to the above plan. The patient has received an after-visit summary and questions were answered concerning future plans and follow up. Advise pt of any urgent changes then to proceed to the ER.

## 2022-02-05 LAB
ANION GAP SERPL CALC-SCNC: 9 MMOL/L (ref 5–15)
BUN SERPL-MCNC: 16 MG/DL (ref 6–20)
BUN/CREAT SERPL: 20 (ref 12–20)
CALCIUM SERPL-MCNC: 9.6 MG/DL (ref 8.5–10.1)
CHLORIDE SERPL-SCNC: 102 MMOL/L (ref 97–108)
CO2 SERPL-SCNC: 25 MMOL/L (ref 21–32)
CREAT SERPL-MCNC: 0.81 MG/DL (ref 0.55–1.02)
GLUCOSE SERPL-MCNC: 83 MG/DL (ref 65–100)
POTASSIUM SERPL-SCNC: 3.9 MMOL/L (ref 3.5–5.1)
SODIUM SERPL-SCNC: 136 MMOL/L (ref 136–145)

## 2022-03-19 PROBLEM — E66.01 SEVERE OBESITY (BMI 35.0-39.9) WITH COMORBIDITY (HCC): Status: ACTIVE | Noted: 2018-03-30

## 2022-03-20 PROBLEM — E11.40 TYPE 2 DIABETES MELLITUS WITH DIABETIC NEUROPATHY (HCC): Status: ACTIVE | Noted: 2019-08-27

## 2022-04-09 NOTE — PATIENT INSTRUCTIONS
Sinusitis: Care Instructions  Your Care Instructions    Sinusitis is an infection of the lining of the sinus cavities in your head. Sinusitis often follows a cold. It causes pain and pressure in your head and face. In most cases, sinusitis gets better on its own in 1 to 2 weeks. But some mild symptoms may last for several weeks. Sometimes antibiotics are needed. Follow-up care is a key part of your treatment and safety. Be sure to make and go to all appointments, and call your doctor if you are having problems. It's also a good idea to know your test results and keep a list of the medicines you take. How can you care for yourself at home? · Take an over-the-counter pain medicine, such as acetaminophen (Tylenol), ibuprofen (Advil, Motrin), or naproxen (Aleve). Read and follow all instructions on the label. · If the doctor prescribed antibiotics, take them as directed. Do not stop taking them just because you feel better. You need to take the full course of antibiotics. · Be careful when taking over-the-counter cold or flu medicines and Tylenol at the same time. Many of these medicines have acetaminophen, which is Tylenol. Read the labels to make sure that you are not taking more than the recommended dose. Too much acetaminophen (Tylenol) can be harmful. · Breathe warm, moist air from a steamy shower, a hot bath, or a sink filled with hot water. Avoid cold, dry air. Using a humidifier in your home may help. Follow the directions for cleaning the machine. · Use saline (saltwater) nasal washes to help keep your nasal passages open and wash out mucus and bacteria. You can buy saline nose drops at a grocery store or drugstore. Or you can make your own at home by adding 1 teaspoon of salt and 1 teaspoon of baking soda to 2 cups of distilled water. If you make your own, fill a bulb syringe with the solution, insert the tip into your nostril, and squeeze gently. Saniya Carp your nose.   · Put a hot, wet towel or a warm gel pack on your face 3 or 4 times a day for 5 to 10 minutes each time. · Try a decongestant nasal spray like oxymetazoline (Afrin). Do not use it for more than 3 days in a row. Using it for more than 3 days can make your congestion worse. When should you call for help? Call your doctor now or seek immediate medical care if:    · You have new or worse swelling or redness in your face or around your eyes.     · You have a new or higher fever.    Watch closely for changes in your health, and be sure to contact your doctor if:    · You have new or worse facial pain.     · The mucus from your nose becomes thicker (like pus) or has new blood in it.     · You are not getting better as expected. Where can you learn more? Go to http://deny-cuate.info/. Enter B487 in the search box to learn more about \"Sinusitis: Care Instructions. \"  Current as of: March 27, 2018  Content Version: 11.9  © 6913-3182 [employer]. Care instructions adapted under license by [employer] (which disclaims liability or warranty for this information). If you have questions about a medical condition or this instruction, always ask your healthcare professional. [place] [address] any warranty or liability for your use of this information. show

## 2022-06-29 ENCOUNTER — OFFICE VISIT (OUTPATIENT)
Dept: FAMILY MEDICINE CLINIC | Age: 67
End: 2022-06-29
Payer: COMMERCIAL

## 2022-06-29 VITALS
OXYGEN SATURATION: 99 % | RESPIRATION RATE: 17 BRPM | HEIGHT: 62 IN | TEMPERATURE: 98 F | WEIGHT: 196.6 LBS | DIASTOLIC BLOOD PRESSURE: 66 MMHG | BODY MASS INDEX: 36.18 KG/M2 | HEART RATE: 76 BPM | SYSTOLIC BLOOD PRESSURE: 127 MMHG

## 2022-06-29 DIAGNOSIS — M51.36 DDD (DEGENERATIVE DISC DISEASE), LUMBAR: ICD-10-CM

## 2022-06-29 DIAGNOSIS — Z12.31 ENCOUNTER FOR SCREENING MAMMOGRAM FOR BREAST CANCER: ICD-10-CM

## 2022-06-29 DIAGNOSIS — E78.2 MIXED HYPERLIPIDEMIA: ICD-10-CM

## 2022-06-29 DIAGNOSIS — M54.12 CERVICAL RADICULITIS: ICD-10-CM

## 2022-06-29 DIAGNOSIS — E11.42 TYPE 2 DIABETES MELLITUS WITH DIABETIC POLYNEUROPATHY, WITH LONG-TERM CURRENT USE OF INSULIN (HCC): ICD-10-CM

## 2022-06-29 DIAGNOSIS — Z13.820 ENCOUNTER FOR SCREENING FOR OSTEOPOROSIS: ICD-10-CM

## 2022-06-29 DIAGNOSIS — Z79.4 TYPE 2 DIABETES MELLITUS WITH DIABETIC POLYNEUROPATHY, WITH LONG-TERM CURRENT USE OF INSULIN (HCC): ICD-10-CM

## 2022-06-29 DIAGNOSIS — Z51.81 ENCOUNTER FOR MEDICATION MONITORING: ICD-10-CM

## 2022-06-29 DIAGNOSIS — I10 ESSENTIAL HYPERTENSION: Primary | ICD-10-CM

## 2022-06-29 DIAGNOSIS — E66.9 NON MORBID OBESITY: ICD-10-CM

## 2022-06-29 LAB
ALBUMIN SERPL-MCNC: 4 G/DL (ref 3.5–5)
ALBUMIN/GLOB SERPL: 1.2 {RATIO} (ref 1.1–2.2)
ALP SERPL-CCNC: 85 U/L (ref 45–117)
ALT SERPL-CCNC: 22 U/L (ref 12–78)
ANION GAP SERPL CALC-SCNC: 6 MMOL/L (ref 5–15)
APPEARANCE UR: CLEAR
AST SERPL-CCNC: 19 U/L (ref 15–37)
BACTERIA URNS QL MICRO: NEGATIVE /HPF
BILIRUB SERPL-MCNC: 0.7 MG/DL (ref 0.2–1)
BILIRUB UR QL: NEGATIVE
BUN SERPL-MCNC: 13 MG/DL (ref 6–20)
BUN/CREAT SERPL: 17 (ref 12–20)
CALCIUM SERPL-MCNC: 9.5 MG/DL (ref 8.5–10.1)
CHLORIDE SERPL-SCNC: 103 MMOL/L (ref 97–108)
CHOLEST SERPL-MCNC: 200 MG/DL
CO2 SERPL-SCNC: 29 MMOL/L (ref 21–32)
COLOR UR: ABNORMAL
CREAT SERPL-MCNC: 0.76 MG/DL (ref 0.55–1.02)
CREAT UR-MCNC: 61.9 MG/DL
EPITH CASTS URNS QL MICRO: ABNORMAL /LPF
ERYTHROCYTE [DISTWIDTH] IN BLOOD BY AUTOMATED COUNT: 15.1 % (ref 11.5–14.5)
GLOBULIN SER CALC-MCNC: 3.3 G/DL (ref 2–4)
GLUCOSE POC: 91 MG/DL
GLUCOSE SERPL-MCNC: 99 MG/DL (ref 65–100)
GLUCOSE UR STRIP.AUTO-MCNC: NEGATIVE MG/DL
HBA1C MFR BLD HPLC: 6.1 %
HCT VFR BLD AUTO: 40.1 % (ref 35–47)
HDLC SERPL-MCNC: 54 MG/DL
HDLC SERPL: 3.7 {RATIO} (ref 0–5)
HGB BLD-MCNC: 13 G/DL (ref 11.5–16)
HGB UR QL STRIP: NEGATIVE
HYALINE CASTS URNS QL MICRO: ABNORMAL /LPF (ref 0–5)
KETONES UR QL STRIP.AUTO: NEGATIVE MG/DL
LDLC SERPL CALC-MCNC: 122.4 MG/DL (ref 0–100)
LEUKOCYTE ESTERASE UR QL STRIP.AUTO: ABNORMAL
MCH RBC QN AUTO: 27.9 PG (ref 26–34)
MCHC RBC AUTO-ENTMCNC: 32.4 G/DL (ref 30–36.5)
MCV RBC AUTO: 86.1 FL (ref 80–99)
MICROALBUMIN UR-MCNC: 0.8 MG/DL
MICROALBUMIN/CREAT UR-RTO: 13 MG/G (ref 0–30)
NITRITE UR QL STRIP.AUTO: NEGATIVE
NRBC # BLD: 0 K/UL (ref 0–0.01)
NRBC BLD-RTO: 0 PER 100 WBC
PH UR STRIP: 5 [PH] (ref 5–8)
PLATELET # BLD AUTO: 221 K/UL (ref 150–400)
PMV BLD AUTO: 11.5 FL (ref 8.9–12.9)
POTASSIUM SERPL-SCNC: 4.1 MMOL/L (ref 3.5–5.1)
PROT SERPL-MCNC: 7.3 G/DL (ref 6.4–8.2)
PROT UR STRIP-MCNC: NEGATIVE MG/DL
RBC # BLD AUTO: 4.66 M/UL (ref 3.8–5.2)
RBC #/AREA URNS HPF: ABNORMAL /HPF (ref 0–5)
SODIUM SERPL-SCNC: 138 MMOL/L (ref 136–145)
SP GR UR REFRACTOMETRY: 1.01 (ref 1–1.03)
TRIGL SERPL-MCNC: 118 MG/DL (ref ?–150)
UROBILINOGEN UR QL STRIP.AUTO: 0.2 EU/DL (ref 0.2–1)
VLDLC SERPL CALC-MCNC: 23.6 MG/DL
WBC # BLD AUTO: 6.6 K/UL (ref 3.6–11)
WBC URNS QL MICRO: ABNORMAL /HPF (ref 0–4)

## 2022-06-29 PROCEDURE — 83036 HEMOGLOBIN GLYCOSYLATED A1C: CPT | Performed by: FAMILY MEDICINE

## 2022-06-29 PROCEDURE — 99214 OFFICE O/P EST MOD 30 MIN: CPT | Performed by: FAMILY MEDICINE

## 2022-06-29 PROCEDURE — 82947 ASSAY GLUCOSE BLOOD QUANT: CPT | Performed by: FAMILY MEDICINE

## 2022-06-29 PROCEDURE — 3044F HG A1C LEVEL LT 7.0%: CPT | Performed by: FAMILY MEDICINE

## 2022-06-29 RX ORDER — PREGABALIN 50 MG/1
50 CAPSULE ORAL 2 TIMES DAILY
COMMUNITY
Start: 2022-06-13

## 2022-06-29 NOTE — PROGRESS NOTES
Room 5     Identified pt with two pt identifiers(name and ). Reviewed record in preparation for visit and have obtained necessary documentation. All patient medications has been reviewed. Chief Complaint   Patient presents with    Diabetes    Hypertension       3 most recent PHQ Screens 2022   Little interest or pleasure in doing things Not at all   Feeling down, depressed, irritable, or hopeless Not at all   Total Score PHQ 2 0     Abuse Screening Questionnaire 10/20/2021   Do you ever feel afraid of your partner? N   Are you in a relationship with someone who physically or mentally threatens you? N   Is it safe for you to go home? Y       Health Maintenance Due   Topic    Shingrix Vaccine Age 50> (1 of 2)    Pneumococcal 0-64 years (2 - PCV)    MICROALBUMIN Q1     DTaP/Tdap/Td series (2 - Td or Tdap)    Foot Exam Q1          Health Maintenance Review: Patient reminded of \"due or due soon\" health maintenance. I have asked the patient to contact his/her primary care provider (PCP) for follow-up on his/her health maintenance. Vitals:    22 1136   BP: 127/66   Pulse: 76   Resp: 17   Temp: 98 °F (36.7 °C)   TempSrc: Temporal   SpO2: 99%   Weight: 196 lb 9.6 oz (89.2 kg)   Height: 5' 2\" (1.575 m)   PainSc:   0 - No pain   LMP: 2004       Wt Readings from Last 3 Encounters:   22 196 lb 9.6 oz (89.2 kg)   22 204 lb (92.5 kg)   10/20/21 207 lb 12.8 oz (94.3 kg)     Temp Readings from Last 3 Encounters:   22 98 °F (36.7 °C) (Temporal)   22 98.1 °F (36.7 °C) (Oral)   10/20/21 97.5 °F (36.4 °C) (Oral)     BP Readings from Last 3 Encounters:   22 127/66   22 138/72   10/20/21 131/77     Pulse Readings from Last 3 Encounters:   22 76   22 95   10/20/21 78       Coordination of Care Questionnaire:   1) Have you been to an emergency room, urgent care, or hospitalized since your last visit?   no       2.  Have seen or consulted any other health care provider since your last visit? [facility]     Patient is accompanied by self I have received verbal consent from eJff Shah to discuss any/all medical information while they are present in the room.

## 2022-06-29 NOTE — PROGRESS NOTES
HISTORY OF PRESENT ILLNESS  Bhargav Benton is a 61 y.o. female. HPI   Follow up on chronic medical problems. Overall feeling well. Pt is retired and her mother lives with her whom she is her caregiver. Doing the precautionary measures at home to reduce risks of exposure COVID19. Also wearing mask when she is going out. No known sick contacts or known exposure to Yaakov Valdivia. HTN follow up:  Compliant w/ meds, low salt diet, and exercise. No home bp monitoring. No swelling, headache or dizziness. No chest pain, SOB, palpitations. Otherwise feeling well since the last visit. DM type II follow up:  Compliant w/ meds, diabetic diet, and exercise. Obtains home glucose monitoring averaging 100-140. Checks BS daily on most days and prn. Pt does not have BS log at visit today. No Rf needed for today. Denies any tingling sensation, polyuria and polydipsia. No blurred vision. No significant weight changes. Feeling well since last OV. Hypercholesterolemia follow up:  Compliant w/ low fat, low cholesterol diet. Exercising some. Off of Lipitor d/t muscle aching. We discussed restarting with another statin pending her results from today. Osteoarthritis:  Patient has osteoarthritis. Has migratory joint aching and stiffness that comes and goes. Has been seeing ortho for \"pinche nerve in the neck\" and also has pain in her lower back and numbness in the right leg. Has pain in the left neck which radiates down her arm. Having FLAVIO in the back per Dr. Yanelis Jones on next month. Pain has been more severe in the past few weeks. Increase pain at night. She has been taking motrin for the pain. Also add lyrica which has helped some. Pain is 7-8/10. She has been using ice which she prefers over heat. Symptoms onset: problem is longstanding. Rheumatological ROS: stable, mild-to-moderate joint symptoms intermittently, reasonably well controlled by PRN meds.    Response to treatment plan: stable and intermittent. HM:  Mammogram 10/28/2021 (appt for 10/28)  Colonoscopy 4/25/2018 by Dr. Angel Last- repeat in [address] exam 2?31/6110 by Dr. Princess Stark. Patient Active Problem List   Diagnosis Code    DJD (degenerative joint disease) back M19.90    Hypokalemia E87.6    History of hysterectomy, supracervical Z90.711    Mixed hyperlipidemia E78.2    Rotator cuff tear M75.100    Unspecified vitamin D deficiency E55.9    Essential hypertension I10    Type 2 diabetes mellitus without complication ([facility] [address] O73.9    Other osteoarthritis of spine, lumbar region M47.896    Encounter for medication monitoring Z51.81    Rotator cuff arthropathy M12.819    S/P rotator cuff repair Z98.890    Severe obesity (BMI 35.0-39. 9) with comorbidity (HCC) E66.01    Type 2 diabetes mellitus with diabetic neuropathy (HCC) E11.40       Current Outpatient Medications   Medication Sig Dispense Refill    celecoxib (CELEBREX) 200 mg capsule Take 1 Capsule by mouth daily as needed for Pain. (do not take while taking the prednisone) 30 Capsule 1    valsartan-hydroCHLOROthiazide (DIOVAN-HCT) 320-25 mg per tablet TAKE 1 TABLET BY MOUTH DAILY 30 Tablet 11    gabapentin (NEURONTIN) 100 mg capsule Take 1 cap every morning and 2 caps every evening      cyclobenzaprine (FLEXERIL) 10 mg tablet Take 10 mg by mouth three (3) times daily as needed.  co-enzyme Q-10 (Co Q-10) 100 mg capsule Take 100 mg by mouth every evening.  SITagliptin-metFORMIN (Janumet XR) 50-1,000 mg TM24 Take 2 Tablets by mouth daily. 60 Tablet 11    rosuvastatin (CRESTOR) 10 mg tablet TAKE 1 TABLET BY MOUTH EVERY EVENING 30 Tablet 0    ALPRAZolam (XANAX) 0.5 mg tablet TAKE 1 TABLET BY MOUTH EVERY NIGHT AT BEDTIME AS NEEDED FOR SLEEP 30 Tab 1    glimepiride (AMARYL) 2 mg tablet TAKE 1 TABLET BY MOUTH ONCE DAILY IN THE MORNING (REPLACING  GLIPIZIDE) 90 Tab 3    cholecalciferol, vitamin D3, (Vitamin D3) 50 mcg (2,000 unit) tab Take 1 Tab by mouth daily.       acetaminophen (TYLENOL EXTRA STRENGTH) 500 mg tablet Take 500 mg by mouth every six (6) hours as needed for Pain.  cyanocobalamin (VITAMIN B-12) 1,000 mcg tablet Take 1,000 mcg by mouth daily.  glucose blood VI test strips (ACCU-CHEK SMARTVIEW TEST STRIP) strip Use to check blood sugar twice daily and prn. 200 Strip 3    Lancets (ACCU-CHEK FASTCLIX) misc Use to obtain blood sample for glucose testing daily and prn. 100 Each 3    aspirin 81 mg tablet Take 81 mg by mouth daily.          Allergies   Allergen Reactions    Latex Rash    Codeine Nausea and Vomiting    Keflex [Cephalexin] Nausea and Vomiting    Lipitor [Atorvastatin] Myalgia    Oxycodone Nausea and Vomiting    Percocet [Oxycodone-Acetaminophen] Nausea and Vomiting    Tessalon [Benzonatate] Hives and Itching    Tramadol Other (comments) and Nausea and Vomiting     dizziness    Vicodin [Hydrocodone-Acetaminophen] Nausea and Vomiting       Past Medical History:   Diagnosis Date    DJD (degenerative joint disease) back 4/10/2010    and neck    DM (diabetes mellitus) ([facility] [address] 4/10/2010    Elevated cholesterol 4/10/2010    HTN (hypertension) 4/10/2010    Hypokalemia 4/10/2010    Unspecified vitamin D deficiency        Past Surgical History:   Procedure Laterality Date    HX HYSTERECTOMY  6/14/04    18 [facility]    HX MOHS PROCEDURES  11/10/2015    right shoulder    HX ORTHOPAEDIC  4/29/13    LEFT ROTATOR CUFF REPAIR      HX ORTHOPAEDIC  06/20/13    shoulder manipulation for frozen shoulder    HX ORTHOPAEDIC  8/5/2014    pt states she had injection by ortho in right groin    HX ORTHOPAEDIC  2/16/2016    right shoulder manipulation    HX ORTHOPAEDIC  11/10/2015    right rotator cuff    HX ORTHOPAEDIC  02/25/2019    steroid injection L3 L4    HX ORTHOPAEDIC  11/04/2019    steroid injection- L3 L4    HX TONSILLECTOMY      age 31    [place] COLONOSCOPY FLX DX W/COLLJ SPEC WHEN PFRMD  03/28/2008    Dr Maritza Ji       Family History   Problem Relation Age of Onset   Kenneth Dickson Hypertension Mother     Diabetes Mother     Kidney Disease Mother     Stroke Mother         TIA    Heart Attack Father     Bleeding Prob Father         taking coumadin    Bipolar Disorder Sister     Hypertension Sister     No Known Problems Sister        Social History     Tobacco Use    Smoking status: Never Smoker    Smokeless tobacco: Never Used   Substance Use Topics    Alcohol use: No     Alcohol/week: 0.0 standard drinks        Lab Results   Component Value Date/Time    WBC 6.6 10/20/2021 11:10 AM    HGB 13.4 10/20/2021 11:10 AM    HCT 41.5 10/20/2021 11:10 AM    PLATELET 823 34/71/7589 11:10 AM    MCV 85.0 10/20/2021 11:10 AM     Lab Results   Component Value Date/Time    Cholesterol, total 150 10/20/2021 11:10 AM    HDL Cholesterol 48 10/20/2021 11:10 AM    LDL, calculated 86.8 10/20/2021 11:10 AM    Triglyceride 76 10/20/2021 11:10 AM    CHOL/HDL Ratio 3.1 10/20/2021 11:10 AM     Lab Results   Component Value Date/Time    TSH 1.020 08/02/2017 09:55 AM      Lab Results   Component Value Date/Time    Sodium 136 02/04/2022 04:02 PM    Potassium 3.9 02/04/2022 04:02 PM    Chloride 102 02/04/2022 04:02 PM    CO2 25 02/04/2022 04:02 PM    Anion gap 9 02/04/2022 04:02 PM    Glucose 83 02/04/2022 04:02 PM    BUN 16 02/04/2022 04:02 PM    Creatinine 0.81 02/04/2022 04:02 PM    BUN/Creatinine ratio 20 02/04/2022 04:02 PM    GFR est AA >60 02/04/2022 04:02 PM    GFR est non-AA >60 02/04/2022 04:02 PM    Calcium 9.6 02/04/2022 04:02 PM    Bilirubin, total 1.1 (H) 10/20/2021 11:10 AM    ALT (SGPT) 26 10/20/2021 11:10 AM    Alk.  phosphatase 99 10/20/2021 11:10 AM    Protein, total 7.5 10/20/2021 11:10 AM    Albumin 4.1 10/20/2021 11:10 AM    Globulin 3.4 10/20/2021 11:10 AM    A-G Ratio 1.2 10/20/2021 11:10 AM      Lab Results   Component Value Date/Time    Hemoglobin A1c 6.8 (H) 04/06/2021 11:04 AM    Hemoglobin A1c (POC) 6.4 02/04/2022 03:56 PM         Review of Systems   Constitutional: Negative for malaise/fatigue. HENT: Negative for congestion. Eyes: Negative for blurred vision. Respiratory: Negative for cough and shortness of breath. Cardiovascular: Negative for chest pain, palpitations and leg swelling. Gastrointestinal: Negative for abdominal pain, constipation and heartburn. Genitourinary: Negative for dysuria, frequency and urgency. Musculoskeletal: Positive for back pain and neck pain. Neurological: Negative for dizziness, tingling and headaches. Endo/Heme/Allergies: Negative for environmental allergies. Psychiatric/Behavioral: Negative for depression. The patient does not have insomnia. Physical Exam  Vitals and nursing note reviewed. Constitutional:       Appearance: Normal appearance. She is well-developed. Comments: /66 (BP 1 Location: Left upper arm, BP Patient Position: Sitting, BP Cuff Size: Adult)   Pulse 76   Temp 98 °F (36.7 °C) (Temporal)   Resp 17   Ht 5' 2\" (1.575 m)   Wt 196 lb 9.6 oz (89.2 kg)   LMP 06/14/2004   SpO2 99%   BMI 35.96 kg/m²    HENT:      Right Ear: Tympanic membrane and ear canal normal.      Left Ear: Tympanic membrane and ear canal normal.   Neck:      Thyroid: No thyromegaly. Cardiovascular:      Rate and Rhythm: Normal rate and regular rhythm. Heart sounds: Normal heart sounds. Pulmonary:      Effort: Pulmonary effort is normal.      Breath sounds: Normal breath sounds. Abdominal:      General: Bowel sounds are normal.      Palpations: Abdomen is soft. There is no mass. Tenderness: There is no abdominal tenderness. Musculoskeletal:         General: Normal range of motion. Cervical back: Normal range of motion and neck supple. Right lower leg: No edema. Left lower leg: No edema. Lymphadenopathy:      Cervical: No cervical adenopathy. Skin:     General: Skin is warm and dry. Neurological:      General: No focal deficit present.       Mental Status: She is alert and oriented to person, place, and time. Psychiatric:         Mood and Affect: Mood normal.         ASSESSMENT and PLAN  Diagnoses and all orders for this visit:    1. Essential hypertension  Stable at goal.      2. Type 2 diabetes mellitus with diabetic polyneuropathy, with long-term current use of insulin ([facility])  a1c stable at 6.1%. Continue to monitor. Work on diet and exercise. -     AMB POC HEMOGLOBIN A1C  -     AMB POC GLUCOSE, QUANTITATIVE, BLOOD  -     MICROALBUMIN, UR, RAND W/ MICROALB/CREAT RATIO; Future    3. Mixed hyperlipidemia  -     LIPID PANEL; Future    4. Cervical radiculitis//  5. DDD (degenerative disc disease), lumbar  As per specialist.      6. Encounter for medication monitoring  -     METABOLIC PANEL, COMPREHENSIVE; Future  -     CBC W/O DIFF; Future  -     URINALYSIS W/MICROSCOPIC; Future    7. Non morbid obesity  I have reviewed/discussed the above normal BMI with the patient. I have recommended the following interventions: dietary management education, guidance, and counseling     8. Encounter for screening mammogram for breast cancer  -     [facility] MAMMO BI SCREENING INCL CAD; Future    9. Encounter for screening for osteoporosis  -     DEXA BONE DENSITY STUDY AXIAL; Future      Follow-up and Dispositions    · Return in about 5 months (around 11/29/2022). reviewed diet, exercise and weight control  cardiovascular risk and specific lipid/LDL goals reviewed  reviewed medications and side effects in detail  specific diabetic recommendations: low cholesterol diet, weight control and daily exercise discussed and glycohemoglobin and other lab monitoring discussed     I have discussed diagnosis listed in this note with pt and/or family. I have discussed treatment plans and options and the risk/benefit analysis of those options, including safe use of medications and possible medication side effects. Through the use of shared decision making we have agreed to the above plan.  The patient has received an after-visit summary and questions were answered concerning future plans and follow up. Advise pt of any urgent changes then to proceed to the ER.

## 2022-07-08 DIAGNOSIS — E11.9 TYPE 2 DIABETES MELLITUS WITHOUT COMPLICATION, UNSPECIFIED WHETHER LONG TERM INSULIN USE (HCC): ICD-10-CM

## 2022-07-08 RX ORDER — GLIMEPIRIDE 2 MG/1
TABLET ORAL
Qty: 90 TABLET | Refills: 3 | Status: SHIPPED | OUTPATIENT
Start: 2022-07-08

## 2022-07-11 ENCOUNTER — TELEPHONE (OUTPATIENT)
Dept: FAMILY MEDICINE CLINIC | Age: 67
End: 2022-07-11

## 2022-07-11 ENCOUNTER — DOCUMENTATION ONLY (OUTPATIENT)
Dept: FAMILY MEDICINE CLINIC | Age: 67
End: 2022-07-11

## 2022-07-11 NOTE — TELEPHONE ENCOUNTER
Pt call. Told that her medication had been changed. No change with her current medication with glimepiride 2 mg daily(this is what was sent in on 7/8/22). She should follow up with the pharmacy.

## 2022-07-11 NOTE — TELEPHONE ENCOUNTER
Patient would like a call from [address] [place] [address] regarding her medication             glimepiride (AMARYL) 2 mg tablet she says that [address] [place] [address] changed it to something else she wants to know why was it changed her call back number is [phone]                    Documentation

## 2022-07-11 NOTE — TELEPHONE ENCOUNTER
Patient would like a call from [address] [place] [address] regarding her medication          glimepiride (AMARYL) 2 mg tablet she says that [address] [place] [address] changed it to something else she wants to know why was it changed her call back number is [phone]

## 2022-09-03 DIAGNOSIS — Z79.4 TYPE 2 DIABETES MELLITUS WITH DIABETIC POLYNEUROPATHY, WITH LONG-TERM CURRENT USE OF INSULIN (HCC): ICD-10-CM

## 2022-09-03 DIAGNOSIS — E11.42 TYPE 2 DIABETES MELLITUS WITH DIABETIC POLYNEUROPATHY, WITH LONG-TERM CURRENT USE OF INSULIN (HCC): ICD-10-CM

## 2022-09-06 RX ORDER — SITAGLIPTIN AND METFORMIN HYDROCHLORIDE 50; 1000 MG/1; MG/1
TABLET, FILM COATED, EXTENDED RELEASE ORAL
Qty: 60 TABLET | Refills: 11 | Status: SHIPPED | OUTPATIENT
Start: 2022-09-06

## 2022-10-12 DIAGNOSIS — I10 HTN (HYPERTENSION): ICD-10-CM

## 2022-10-12 RX ORDER — VALSARTAN AND HYDROCHLOROTHIAZIDE 320; 25 MG/1; MG/1
1 TABLET, FILM COATED ORAL DAILY
Qty: 30 TABLET | Refills: 11 | Status: SHIPPED | OUTPATIENT
Start: 2022-10-12

## 2022-11-01 ENCOUNTER — HOSPITAL ENCOUNTER (OUTPATIENT)
Dept: MAMMOGRAPHY | Age: 67
Discharge: HOME | End: 2022-11-01
Attending: FAMILY MEDICINE
Payer: MEDICARE

## 2022-11-01 DIAGNOSIS — Z12.31 ENCOUNTER FOR SCREENING MAMMOGRAM FOR BREAST CANCER: ICD-10-CM

## 2022-11-01 PROCEDURE — 77067 SCR MAMMO BI INCL CAD: CPT

## 2022-11-18 ENCOUNTER — TELEPHONE (OUTPATIENT)
Dept: FAMILY MEDICINE CLINIC | Age: 67
End: 2022-11-18

## 2022-11-18 ENCOUNTER — HOSPITAL ENCOUNTER (OUTPATIENT)
Dept: BONE DENSITY | Age: 67
Discharge: HOME | End: 2022-11-18
Attending: FAMILY MEDICINE
Payer: MEDICARE

## 2022-11-18 DIAGNOSIS — Z78.0 POSTMENOPAUSE: Primary | ICD-10-CM

## 2022-11-18 DIAGNOSIS — Z13.820 ENCOUNTER FOR SCREENING FOR OSTEOPOROSIS: ICD-10-CM

## 2022-11-18 DIAGNOSIS — Z78.0 POSTMENOPAUSE: ICD-10-CM

## 2022-11-18 PROCEDURE — 77080 DXA BONE DENSITY AXIAL: CPT

## 2022-11-29 ENCOUNTER — OFFICE VISIT (OUTPATIENT)
Dept: FAMILY MEDICINE CLINIC | Age: 67
End: 2022-11-29
Payer: MEDICARE

## 2022-11-29 VITALS
SYSTOLIC BLOOD PRESSURE: 134 MMHG | DIASTOLIC BLOOD PRESSURE: 64 MMHG | TEMPERATURE: 98.3 F | HEIGHT: 62 IN | OXYGEN SATURATION: 100 % | HEART RATE: 79 BPM | BODY MASS INDEX: 36.03 KG/M2 | WEIGHT: 195.8 LBS | RESPIRATION RATE: 12 BRPM

## 2022-11-29 DIAGNOSIS — M51.36 DDD (DEGENERATIVE DISC DISEASE), LUMBAR: ICD-10-CM

## 2022-11-29 DIAGNOSIS — I10 ESSENTIAL HYPERTENSION: Primary | ICD-10-CM

## 2022-11-29 DIAGNOSIS — Z79.4 TYPE 2 DIABETES MELLITUS WITH DIABETIC POLYNEUROPATHY, WITH LONG-TERM CURRENT USE OF INSULIN (HCC): ICD-10-CM

## 2022-11-29 DIAGNOSIS — M15.9 PRIMARY OSTEOARTHRITIS INVOLVING MULTIPLE JOINTS: ICD-10-CM

## 2022-11-29 DIAGNOSIS — Z51.81 ENCOUNTER FOR MEDICATION MONITORING: ICD-10-CM

## 2022-11-29 DIAGNOSIS — E78.2 MIXED HYPERLIPIDEMIA: ICD-10-CM

## 2022-11-29 DIAGNOSIS — E66.9 NON MORBID OBESITY: ICD-10-CM

## 2022-11-29 DIAGNOSIS — E11.42 TYPE 2 DIABETES MELLITUS WITH DIABETIC POLYNEUROPATHY, WITH LONG-TERM CURRENT USE OF INSULIN (HCC): ICD-10-CM

## 2022-11-29 LAB
GLUCOSE POC: 117 MG/DL
HBA1C MFR BLD HPLC: 7.2 %

## 2022-11-29 PROCEDURE — 1090F PRES/ABSN URINE INCON ASSESS: CPT | Performed by: FAMILY MEDICINE

## 2022-11-29 PROCEDURE — G8752 SYS BP LESS 140: HCPCS | Performed by: FAMILY MEDICINE

## 2022-11-29 PROCEDURE — 83036 HEMOGLOBIN GLYCOSYLATED A1C: CPT | Performed by: FAMILY MEDICINE

## 2022-11-29 PROCEDURE — G9899 SCRN MAM PERF RSLTS DOC: HCPCS | Performed by: FAMILY MEDICINE

## 2022-11-29 PROCEDURE — 99214 OFFICE O/P EST MOD 30 MIN: CPT | Performed by: FAMILY MEDICINE

## 2022-11-29 PROCEDURE — 82947 ASSAY GLUCOSE BLOOD QUANT: CPT | Performed by: FAMILY MEDICINE

## 2022-11-29 PROCEDURE — G8510 SCR DEP NEG, NO PLAN REQD: HCPCS | Performed by: FAMILY MEDICINE

## 2022-11-29 PROCEDURE — 2022F DILAT RTA XM EVC RTNOPTHY: CPT | Performed by: FAMILY MEDICINE

## 2022-11-29 PROCEDURE — G8754 DIAS BP LESS 90: HCPCS | Performed by: FAMILY MEDICINE

## 2022-11-29 PROCEDURE — 1101F PT FALLS ASSESS-DOCD LE1/YR: CPT | Performed by: FAMILY MEDICINE

## 2022-11-29 PROCEDURE — 3017F COLORECTAL CA SCREEN DOC REV: CPT | Performed by: FAMILY MEDICINE

## 2022-11-29 PROCEDURE — G8399 PT W/DXA RESULTS DOCUMENT: HCPCS | Performed by: FAMILY MEDICINE

## 2022-11-29 PROCEDURE — 3078F DIAST BP <80 MM HG: CPT | Performed by: FAMILY MEDICINE

## 2022-11-29 PROCEDURE — G8427 DOCREV CUR MEDS BY ELIG CLIN: HCPCS | Performed by: FAMILY MEDICINE

## 2022-11-29 PROCEDURE — 3074F SYST BP LT 130 MM HG: CPT | Performed by: FAMILY MEDICINE

## 2022-11-29 PROCEDURE — G0463 HOSPITAL OUTPT CLINIC VISIT: HCPCS | Performed by: FAMILY MEDICINE

## 2022-11-29 PROCEDURE — G8536 NO DOC ELDER MAL SCRN: HCPCS | Performed by: FAMILY MEDICINE

## 2022-11-29 PROCEDURE — 3051F HG A1C>EQUAL 7.0%<8.0%: CPT | Performed by: FAMILY MEDICINE

## 2022-11-29 PROCEDURE — G8417 CALC BMI ABV UP PARAM F/U: HCPCS | Performed by: FAMILY MEDICINE

## 2022-11-29 PROCEDURE — 1123F ACP DISCUSS/DSCN MKR DOCD: CPT | Performed by: FAMILY MEDICINE

## 2022-11-29 NOTE — PROGRESS NOTES
Patient identified by 2 identifiers. Chief Complaint   Patient presents with    Follow-up    Diabetes     1. Have you been to the ER, urgent care clinic since your last visit? Hospitalized since your last visit? No    2. Have you seen or consulted any other health care providers outside of the [address] [place] [address] since your last visit? Include any pap smears or colon screening. No  Patient identified by 2 identifiers.

## 2022-11-29 NOTE — PROGRESS NOTES
HISTORY OF PRESENT ILLNESS  Audrey Olson is a 74 y.o. female. HPI   Follow up on chronic medical problems. Overall feeling well. HTN follow up:  Compliant w/ meds, low salt diet, and exercise. No home bp monitoring. No swelling, headache or dizziness. No chest pain, SOB, palpitations. Otherwise feeling well since the last visit. DM type II follow up:  Compliant w/ meds, diabetic diet, and exercise. Obtains home glucose monitoring averaging 100-140. Checks BS daily on most days and prn. Pt does not have BS log at visit today. No Rf needed for today. Denies any tingling sensation, polyuria and polydipsia. No blurred vision. No significant weight changes. Feeling well since last OV. Hypercholesterolemia follow up:  Compliant w/ low fat, low cholesterol diet. Exercising some. Off of Lipitor d/t muscle aching. We discussed restarting with another statin pending her results from today. Osteoarthritis:  Patient has osteoarthritis. Has migratory joint aching and stiffness that comes and goes. Has been seeing ortho for \"pinche nerve in the neck\" and also has pain in her lower back and numbness in the right leg. Has pain in the left neck which radiates down her arm. Having FLAVIO in the back per Dr. Sherri Aceves on next month. Pain has been more severe in the past few weeks. Increase pain at night. She has been taking motrin for the pain. Also add lyrica which has helped some. Pain is 7-8/10. She has been using ice which she prefers over heat. Symptoms onset: problem is longstanding. Rheumatological ROS: stable, mild-to-moderate joint symptoms intermittently, reasonably well controlled by PRN meds. Response to treatment plan: stable and intermittent.    HM:  Mammogram  11/1/22  Colonoscopy 4/25/2018 by Dr. King Martinez- repeat in 10 years     Patient Active Problem List   Diagnosis Code    DJD (degenerative joint disease) back M19.90    Hypokalemia E87.6    History of hysterectomy, supracervical Z90.711    Mixed hyperlipidemia E78.2    Rotator cuff tear M75.100    Unspecified vitamin D deficiency E55.9    Essential hypertension I10    Type 2 diabetes mellitus without complication ([facility]) M24.8    Other osteoarthritis of spine, lumbar region M47.896    Encounter for medication monitoring Z51.81    Rotator cuff arthropathy M12.819    S/P rotator cuff repair Z98.890    Severe obesity (BMI 35.0-39. 9) with comorbidity ([facility]) E66.01    Type 2 diabetes mellitus with diabetic neuropathy ([facility]) E11.40       Current Outpatient Medications   Medication Sig Dispense Refill    valsartan-hydroCHLOROthiazide (DIOVAN-HCT) 320-25 mg per tablet Take 1 Tablet by mouth daily. 30 Tablet 11    Janumet XR 50-1,000 mg TM24 TAKE 1 TABLET BY MOUTH TWICE DAILY WITH MEALS 60 Tablet 11    rosuvastatin (CRESTOR) 10 mg tablet TAKE 1 TABLET BY MOUTH EVERY EVENING 30 Tablet 11    glimepiride (AMARYL) 2 mg tablet TAKE 1 TABLET BY MOUTH ONCE DAILY IN THE MORNING( REPLACING GLIPIZIDE) 90 Tablet 3    pregabalin (LYRICA) 50 mg capsule Take 50 mg by mouth two (2) times a day. cyclobenzaprine (FLEXERIL) 10 mg tablet Take 10 mg by mouth three (3) times daily as needed. co-enzyme Q-10 (Co Q-10) 100 mg capsule Take 100 mg by mouth every evening. cholecalciferol, vitamin D3, (Vitamin D3) 50 mcg (2,000 unit) tab Take 1 Tab by mouth daily. acetaminophen (TYLENOL EXTRA STRENGTH) 500 mg tablet Take 500 mg by mouth every six (6) hours as needed for Pain. cyanocobalamin (VITAMIN B-12) 1,000 mcg tablet Take 1,000 mcg by mouth daily. glucose blood VI test strips (ACCU-CHEK SMARTVIEW TEST STRIP) strip Use to check blood sugar twice daily and prn. 200 Strip 3    Lancets (ACCU-CHEK FASTCLIX) misc Use to obtain blood sample for glucose testing daily and prn.  100 Each 3       Allergies   Allergen Reactions    Latex Rash    Codeine Nausea and Vomiting    Keflex [Cephalexin] Nausea and Vomiting    Lipitor [Atorvastatin] Myalgia Oxycodone Nausea and Vomiting    Percocet [Oxycodone-Acetaminophen] Nausea and Vomiting    Tessalon [Benzonatate] Hives and Itching    Tramadol Other (comments) and Nausea and Vomiting     dizziness    Vicodin [Hydrocodone-Acetaminophen] Nausea and Vomiting           Past Medical History:   Diagnosis Date    DJD (degenerative joint disease) back 4/10/2010    and neck    DM (diabetes mellitus) ([facility] [address] 4/10/2010    Elevated cholesterol 4/10/2010    HTN (hypertension) 4/10/2010    Hypokalemia 4/10/2010    Unspecified vitamin D deficiency            Past Surgical History:   Procedure Laterality Date    HX HYSTERECTOMY  6/14/04    [address]    HX MOHS PROCEDURES  11/10/2015    right shoulder    HX ORTHOPAEDIC  4/29/13    LEFT ROTATOR CUFF REPAIR      HX ORTHOPAEDIC  06/20/13    shoulder manipulation for frozen shoulder    HX ORTHOPAEDIC  8/5/2014    pt states she had injection by ortho in right groin    HX ORTHOPAEDIC  2/16/2016    right shoulder manipulation    HX ORTHOPAEDIC  11/10/2015    right rotator cuff    HX ORTHOPAEDIC  02/25/2019    steroid injection L3 L4    HX ORTHOPAEDIC  11/04/2019    steroid injection- L3 L4    HX TONSILLECTOMY      age 31    MI COLONOSCOPY FLX DX W/COLLJ SPEC WHEN PFRMD  03/28/2008    Dr Kristian Hernandez           Family History   Problem Relation Age of Onset    Hypertension Mother     Diabetes Mother     Kidney Disease Mother     Stroke Mother         TIA    Heart Attack Father     Bleeding Prob Father         taking coumadin    Bipolar Disorder Sister     Hypertension Sister     No Known Problems Sister        Social History     Tobacco Use    Smoking status: Never    Smokeless tobacco: Never   Substance Use Topics    Alcohol use: No     Alcohol/week: 0.0 standard drinks        Lab Results   Component Value Date/Time    WBC 6.6 06/29/2022 12:47 PM    HGB 13.0 06/29/2022 12:47 PM    HCT 40.1 06/29/2022 12:47 PM    PLATELET 861 19/47/2900 12:47 PM    MCV 86.1 06/29/2022 12:47 PM     Lab Results   Component Value Date/Time    Cholesterol, total 200 (H) 06/29/2022 12:47 PM    HDL Cholesterol 54 06/29/2022 12:47 PM    LDL, calculated 122.4 (H) 06/29/2022 12:47 PM    Triglyceride 118 06/29/2022 12:47 PM    CHOL/HDL Ratio 3.7 06/29/2022 12:47 PM     Lab Results   Component Value Date/Time    TSH 1.020 08/02/2017 09:55 AM      Lab Results   Component Value Date/Time    Sodium 138 06/29/2022 12:47 PM    Potassium 4.1 06/29/2022 12:47 PM    Chloride 103 06/29/2022 12:47 PM    CO2 29 06/29/2022 12:47 PM    Anion gap 6 06/29/2022 12:47 PM    Glucose 99 06/29/2022 12:47 PM    BUN 13 06/29/2022 12:47 PM    Creatinine 0.76 06/29/2022 12:47 PM    BUN/Creatinine ratio 17 06/29/2022 12:47 PM    GFR est AA >60 06/29/2022 12:47 PM    GFR est non-AA >60 06/29/2022 12:47 PM    Calcium 9.5 06/29/2022 12:47 PM    Bilirubin, total 0.7 06/29/2022 12:47 PM    ALT (SGPT) 22 06/29/2022 12:47 PM    Alk. phosphatase 85 06/29/2022 12:47 PM    Protein, total 7.3 06/29/2022 12:47 PM    Albumin 4.0 06/29/2022 12:47 PM    Globulin 3.3 06/29/2022 12:47 PM    A-G Ratio 1.2 06/29/2022 12:47 PM      Lab Results   Component Value Date/Time    Hemoglobin A1c 6.8 (H) 04/06/2021 11:04 AM    Hemoglobin A1c (POC) 6.1 06/29/2022 12:12 PM         Review of Systems   Constitutional:  Negative for malaise/fatigue. HENT:  Negative for congestion. Eyes:  Negative for blurred vision. Respiratory:  Negative for cough and shortness of breath. Cardiovascular:  Negative for chest pain, palpitations and leg swelling. Gastrointestinal:  Negative for abdominal pain, constipation and heartburn. Genitourinary:  Negative for dysuria, frequency and urgency. Musculoskeletal:  Positive for back pain and joint pain. Neurological:  Negative for dizziness, tingling and headaches. Endo/Heme/Allergies:  Negative for environmental allergies. Psychiatric/Behavioral:  Negative for depression. The patient does not have insomnia.       Physical Exam  Vitals and nursing note reviewed. Constitutional:       Appearance: Normal appearance. She is well-developed. Comments: /66 (BP 1 Location: Left upper arm, BP Patient Position: Sitting, BP Cuff Size: Adult)   Pulse 76   Temp 98 °F (36.7 °C) (Temporal)   Resp 17   Ht 5' 2\" (1.575 m)   Wt 196 lb 9.6 oz (89.2 kg)   LMP 06/14/2004   SpO2 99%   BMI 35.96 kg/m²    HENT:      Right Ear: Tympanic membrane and ear canal normal.      Left Ear: Tympanic membrane and ear canal normal.   Neck:      Thyroid: No thyromegaly. Cardiovascular:      Rate and Rhythm: Normal rate and regular rhythm. Heart sounds: Normal heart sounds. Pulmonary:      Effort: Pulmonary effort is normal.      Breath sounds: Normal breath sounds. Abdominal:      General: Bowel sounds are normal.      Palpations: Abdomen is soft. There is no mass. Tenderness: There is no abdominal tenderness. Musculoskeletal:         General: Normal range of motion. Cervical back: Normal range of motion and neck supple. Right lower leg: No edema. Left lower leg: No edema. Lymphadenopathy:      Cervical: No cervical adenopathy. Skin:     General: Skin is warm and dry. Neurological:      General: No focal deficit present. Mental Status: She is alert and oriented to person, place, and time. Psychiatric:         Mood and Affect: Mood normal.       ASSESSMENT and PLAN  Diagnoses and all orders for this visit:    1. Essential hypertension  Stable     2. Type 2 diabetes mellitus with diabetic polyneuropathy, with long-term current use of insulin ([facility])  A1c 7.2%. We discussed switching her off of glimepiride and change to Ozempic.    -     AMB POC HEMOGLOBIN A1C  -     AMB POC GLUCOSE, QUANTITATIVE, BLOOD    3. Mixed hyperlipidemia  -     LIPID PANEL; Future    4. DDD (degenerative disc disease), lumbar  5. Primary osteoarthritis involving multiple joints  Overall stable     6.  Encounter for medication monitoring  -     METABOLIC PANEL, COMPREHENSIVE; Future    7. Non morbid obesity  I have reviewed/discussed the above normal BMI with the patient. I have recommended the following interventions: dietary management education, guidance, and counseling . Follow-up and Dispositions    Return in about 3 months (around 2/28/2023). reviewed diet, exercise and weight control  cardiovascular risk and specific lipid/LDL goals reviewed  reviewed medications and side effects in detail  specific diabetic recommendations: low cholesterol diet, weight control and daily exercise discussed, all medications, side effects and compliance discussed carefully, foot care discussed and Podiatry visits discussed, annual eye examinations at Ophthalmology discussed, and glycohemoglobin and other lab monitoring discussed      I have discussed diagnosis listed in this note with pt and/or family. I have discussed treatment plans and options and the risk/benefit analysis of those options, including safe use of medications and possible medication side effects. Through the use of shared decision making we have agreed to the above plan. The patient has received an after-visit summary and questions were answered concerning future plans and follow up. Advise pt of any urgent changes then to proceed to the ER.

## 2022-11-30 LAB
ALBUMIN SERPL-MCNC: 4.2 G/DL (ref 3.5–5)
ALBUMIN/GLOB SERPL: 1.2 {RATIO} (ref 1.1–2.2)
ALP SERPL-CCNC: 100 U/L (ref 45–117)
ALT SERPL-CCNC: 26 U/L (ref 12–78)
ANION GAP SERPL CALC-SCNC: 5 MMOL/L (ref 5–15)
AST SERPL-CCNC: 14 U/L (ref 15–37)
BILIRUB SERPL-MCNC: 0.9 MG/DL (ref 0.2–1)
BUN SERPL-MCNC: 16 MG/DL (ref 6–20)
BUN/CREAT SERPL: 21 (ref 12–20)
CALCIUM SERPL-MCNC: 9.6 MG/DL (ref 8.5–10.1)
CHLORIDE SERPL-SCNC: 105 MMOL/L (ref 97–108)
CHOLEST SERPL-MCNC: 163 MG/DL
CO2 SERPL-SCNC: 31 MMOL/L (ref 21–32)
CREAT SERPL-MCNC: 0.77 MG/DL (ref 0.55–1.02)
GLOBULIN SER CALC-MCNC: 3.6 G/DL (ref 2–4)
GLUCOSE SERPL-MCNC: 117 MG/DL (ref 65–100)
HDLC SERPL-MCNC: 55 MG/DL
HDLC SERPL: 3 {RATIO} (ref 0–5)
LDLC SERPL CALC-MCNC: 86 MG/DL (ref 0–100)
POTASSIUM SERPL-SCNC: 4.1 MMOL/L (ref 3.5–5.1)
PROT SERPL-MCNC: 7.8 G/DL (ref 6.4–8.2)
SODIUM SERPL-SCNC: 141 MMOL/L (ref 136–145)
TRIGL SERPL-MCNC: 110 MG/DL (ref ?–150)
VLDLC SERPL CALC-MCNC: 22 MG/DL

## 2022-12-02 NOTE — PROGRESS NOTES
Agree with Documentation noted by Marzette Rubinstein,  PharmD    Pt referred by Dr. Naye Avilez for initial DM2 visit to start a GLP1 agonist and adjust other medications as indicated. Pt has reservations regarding injections. Also concerned about medication expense now that she's going on Medicare and has a high deductible. -Will start Rybelsus 3 mg daily, educated in detail on how to take the medication and what to expect, provided samples  Samples logged, documented and signed out per policy. (Had to enter sample order twice for some reason as did not show up in  field the first time to put in the lot / exp)  -Gave PAP pako to fill out and bring back with POI for continuation of therapy as she meets the eligibility criteria  -continue Janumet for now as she has a lot left, will change to plain metformin if she can continue on the Rybelsus  -continue glimepiride until start Rybelsus which she plans to do later this month  -also increase crestor to 20 mg daily   -Per CPA with Dr. Naye Avilez  -Follow up in 4 weeks  -Call in the interim if any questions    Next visit adjust med list to reflect what pt is currently taking and send order for plain metformin xr as indicated; confirm if pt is taking Lyrica as well (outside med rec medication)    Yecenia Angel, PHARMD, [facility] [address] in [facility]: Yes  Recommendation Provided To: Patient/Caregiver: 6 via In person  Intervention Detail: Discontinued Rx: 3, reason: Therapy Complete, New Rx: 1, reason: Needs Additional Therapy, Patient Access Assistance/Sample Provided, and Scheduled Appointment  Intervention Accepted By: Patient/Caregiver: 6  Time Spent (min): 60

## 2022-12-15 ENCOUNTER — OFFICE VISIT (OUTPATIENT)
Dept: FAMILY MEDICINE CLINIC | Age: 67
End: 2022-12-15

## 2022-12-15 DIAGNOSIS — E11.9 TYPE 2 DIABETES MELLITUS WITHOUT COMPLICATION, UNSPECIFIED WHETHER LONG TERM INSULIN USE (HCC): Primary | ICD-10-CM

## 2022-12-15 RX ORDER — ROSUVASTATIN CALCIUM 20 MG/1
20 TABLET, COATED ORAL
Qty: 90 TABLET | Refills: 3 | Status: SHIPPED | OUTPATIENT
Start: 2022-12-15

## 2022-12-15 NOTE — PROGRESS NOTES
Pharmacy Progress Note - Diabetes Management    Assessment / Plan:   Diabetes Management:  - Per ADA guidelines, Pt's A1c is not at goal of < 7%. - Recommend trial Rybelsus for one month. Ms. Vera Arias will start 12/26/22. Provided one month supply of Rybelsus 3 mg daily and [place] Nordisk PAP application.  - Continue glimepiride 2 mg daily until 12/26 and then stop taking once start Rybelsus. Continue Janumet XR  mg BID with meals. Hyperlipidemia:  - Increase rosuvastatin dose to 20 mg daily (two 10 mg tablets until current medication supply used up, then  20 mg dose). Goal LDL <55 mg/dL per 2023 ADA guidelines for patients with ASCVD risk. S/O: Ms. Jake Sierra is a 74 y.o. female, referred by Dr. Millie Waite MD, with a PMH of T2DM, HTN, HLD, DDD, osteoarthritis, was seen today for diabetes management follow up. Patient's last A1c was 7.2% (11/29/22), 6.1% (06/2022), 6.4% (02/2022), 6.9% (10/2021). Dr. Bong Friedman referred the patient to discuss discontinuing glimepiride and starting a GLP1 receptor agonist. Discussed Ozempic benefits including reduced hypoglycemia risks and weight loss benefit. Ms. Olivia Pan endorsed understanding the benefits but is firmly against using injectable medications at this time. Discussed Rybelsus risks and benefits. Reviewed possible costs associated with medication. Deductible for Janumet expensive and is expected to be >$400 in January. [employer] Patient Assistance Program and income requirements for enrollment.     Current anti-hyperglycemic regimen include(s):    - Janumet XR  mg PO BID with meals  - Glimepiride 2 mg PO every morning    - Rosuvastatin 10 mg PO daily, Valsartan-HCTZ 320-25 mg PO daily    ROS:  Today, Pt endorses:  - Symptoms of Hyperglycemia: none  - Symptoms of Hypoglycemia: none    - Physical Activity:  - Walks outside on the track when the weather is better    The 10-year ASCVD risk score (Pino DK, et al., 2019) is: 19.1%    Values used to calculate the score:      Age: 74 years      Sex: Female      Is Non- : Yes      Diabetic: Yes      Tobacco smoker: No      Systolic Blood Pressure: 033 mmHg      Is BP treated: Yes      HDL Cholesterol: 55 MG/DL      Total Cholesterol: 163 MG/DL     Vitals: Wt Readings from Last 3 Encounters:   11/29/22 195 lb 12.8 oz (88.8 kg)   06/29/22 196 lb 9.6 oz (89.2 kg)   02/04/22 204 lb (92.5 kg)     BP Readings from Last 3 Encounters:   11/29/22 134/64   06/29/22 127/66   02/04/22 138/72     Pulse Readings from Last 3 Encounters:   11/29/22 79   06/29/22 76   02/04/22 95       Past Medical History:   Diagnosis Date    DJD (degenerative joint disease) back 4/10/2010    and neck    [facility] 4/10/2010    Elevated cholesterol 4/10/2010    HTN (hypertension) 4/10/2010    Hypokalemia 4/10/2010    Unspecified vitamin D deficiency      Allergies   Allergen Reactions    Latex Rash    Codeine Nausea and Vomiting    Keflex [Cephalexin] Nausea and Vomiting    Lipitor [Atorvastatin] Myalgia    Oxycodone Nausea and Vomiting    Percocet [Oxycodone-Acetaminophen] Nausea and Vomiting    Tessalon [Benzonatate] Hives and Itching    Tramadol Other (comments) and Nausea and Vomiting     dizziness    Vicodin [Hydrocodone-Acetaminophen] Nausea and Vomiting       Current Outpatient Medications   Medication Sig    rosuvastatin (CRESTOR) 20 mg tablet Take 1 Tablet by mouth nightly. semaglutide (Rybelsus) 3 mg tablet Take 1 Tablet by mouth Daily (before breakfast). valsartan-hydroCHLOROthiazide (DIOVAN-HCT) 320-25 mg per tablet Take 1 Tablet by mouth daily. Janumet XR 50-1,000 mg TM24 TAKE 1 TABLET BY MOUTH TWICE DAILY WITH MEALS    glimepiride (AMARYL) 2 mg tablet TAKE 1 TABLET BY MOUTH ONCE DAILY IN THE MORNING( REPLACING GLIPIZIDE)    cyclobenzaprine (FLEXERIL) 10 mg tablet Take 10 mg by mouth three (3) times daily as needed.     co-enzyme Q-10 (CO Q-10) 100 mg capsule Take 100 mg by mouth every evening. cholecalciferol, vitamin D3, 50 mcg (2,000 unit) tab Take 1 Tab by mouth daily. acetaminophen (TYLENOL) 500 mg tablet Take 500 mg by mouth every six (6) hours as needed for Pain. cyanocobalamin 1,000 mcg tablet Take 1,000 mcg by mouth daily. glucose blood VI test strips (ACCU-CHEK SMARTVIEW TEST STRIP) strip Use to check blood sugar twice daily and prn. Lancets (ACCU-CHEK FASTCLIX) misc Use to obtain blood sample for glucose testing daily and prn. No current facility-administered medications for this visit. Lab Results   Component Value Date/Time    Sodium 141 11/29/2022 12:02 PM    Potassium 4.1 11/29/2022 12:02 PM    Chloride 105 11/29/2022 12:02 PM    CO2 31 11/29/2022 12:02 PM    Anion gap 5 11/29/2022 12:02 PM    Glucose 117 (H) 11/29/2022 12:02 PM    BUN 16 11/29/2022 12:02 PM    Creatinine 0.77 11/29/2022 12:02 PM    BUN/Creatinine ratio 21 (H) 11/29/2022 12:02 PM    GFR est AA >60 06/29/2022 12:47 PM    GFR est non-AA >60 06/29/2022 12:47 PM    Calcium 9.6 11/29/2022 12:02 PM    Bilirubin, total 0.9 11/29/2022 12:02 PM    Alk.  phosphatase 100 11/29/2022 12:02 PM    Protein, total 7.8 11/29/2022 12:02 PM    Albumin 4.2 11/29/2022 12:02 PM    Globulin 3.6 11/29/2022 12:02 PM    A-G Ratio 1.2 11/29/2022 12:02 PM    ALT (SGPT) 26 11/29/2022 12:02 PM       Lab Results   Component Value Date/Time    Cholesterol, total 163 11/29/2022 12:02 PM    HDL Cholesterol 55 11/29/2022 12:02 PM    LDL, calculated 86 11/29/2022 12:02 PM    VLDL, calculated 22 11/29/2022 12:02 PM    Triglyceride 110 11/29/2022 12:02 PM    CHOL/HDL Ratio 3.0 11/29/2022 12:02 PM       Lab Results   Component Value Date/Time    WBC 6.6 06/29/2022 12:47 PM    HGB 13.0 06/29/2022 12:47 PM    HCT 40.1 06/29/2022 12:47 PM    PLATELET 055 03/45/8557 12:47 PM    MCV 86.1 06/29/2022 12:47 PM       Lab Results   Component Value Date/Time    Microalbumin/Creat ratio (mg/g creat) 13 06/29/2022 12:47 PM    Microalbumin,urine random 0.80 06/29/2022 12:47 PM       HbA1c:  Lab Results   Component Value Date/Time    Hemoglobin A1c 6.8 (H) 04/06/2021 11:04 AM    Hemoglobin A1c (POC) 7.2 11/29/2022 11:30 AM     No components found for: 2     Last Point of Care HGB A1C  Hemoglobin A1c (POC)   Date Value Ref Range Status   11/29/2022 7.2 % Final        Patient verbalized understanding of the information presented and all of the patients questions were answered. AVS was handed to the patient. Patient advised to call the office with any additional questions or concerns. Notifications of recommendations will be sent to Dr. Alec Nixon MD for review. Patient will return to clinic in 4.5 week(s) for follow up.      Thank you for the consult,  Priscilla Dos Santos, LauraD

## 2022-12-16 RX ORDER — PREGABALIN 50 MG/1
CAPSULE ORAL
COMMUNITY
Start: 2022-12-11

## 2023-01-07 ENCOUNTER — HOSPITAL ENCOUNTER (EMERGENCY)
Age: 68
Discharge: HOME | End: 2023-01-07
Attending: STUDENT IN AN ORGANIZED HEALTH CARE EDUCATION/TRAINING PROGRAM
Payer: MEDICARE

## 2023-01-07 VITALS
SYSTOLIC BLOOD PRESSURE: 172 MMHG | RESPIRATION RATE: 16 BRPM | DIASTOLIC BLOOD PRESSURE: 75 MMHG | WEIGHT: 195 LBS | TEMPERATURE: 98.1 F | OXYGEN SATURATION: 100 % | HEART RATE: 98 BPM | HEIGHT: 62 IN | BODY MASS INDEX: 35.88 KG/M2

## 2023-01-07 DIAGNOSIS — M54.16 LUMBAR RADICULOPATHY: Primary | ICD-10-CM

## 2023-01-07 DIAGNOSIS — M54.50 ACUTE EXACERBATION OF CHRONIC LOW BACK PAIN: ICD-10-CM

## 2023-01-07 DIAGNOSIS — G89.29 ACUTE EXACERBATION OF CHRONIC LOW BACK PAIN: ICD-10-CM

## 2023-01-07 DIAGNOSIS — R03.0 ELEVATED BLOOD PRESSURE READING: ICD-10-CM

## 2023-01-07 PROCEDURE — 96372 THER/PROPH/DIAG INJ SC/IM: CPT

## 2023-01-07 PROCEDURE — A9270 NON-COVERED ITEM OR SERVICE: HCPCS | Performed by: PHYSICIAN ASSISTANT

## 2023-01-07 PROCEDURE — 74011636637 HC RX REV CODE- 636/637: Performed by: PHYSICIAN ASSISTANT

## 2023-01-07 PROCEDURE — 74011250636 HC RX REV CODE- 250/636: Performed by: PHYSICIAN ASSISTANT

## 2023-01-07 PROCEDURE — 99284 EMERGENCY DEPT VISIT MOD MDM: CPT

## 2023-01-07 RX ORDER — PREDNISONE 10 MG/1
TABLET ORAL
Qty: 21 TABLET | Refills: 0 | Status: SHIPPED | OUTPATIENT
Start: 2023-01-07

## 2023-01-07 RX ORDER — KETOROLAC TROMETHAMINE 30 MG/ML
15 INJECTION, SOLUTION INTRAMUSCULAR; INTRAVENOUS ONCE
Status: COMPLETED | OUTPATIENT
Start: 2023-01-07 | End: 2023-01-07

## 2023-01-07 RX ORDER — PREDNISONE 20 MG/1
60 TABLET ORAL
Status: COMPLETED | OUTPATIENT
Start: 2023-01-07 | End: 2023-01-07

## 2023-01-07 RX ORDER — METHOCARBAMOL 750 MG/1
750 TABLET, FILM COATED ORAL 4 TIMES DAILY
Qty: 30 TABLET | Refills: 0 | Status: SHIPPED | OUTPATIENT
Start: 2023-01-07

## 2023-01-07 RX ADMIN — KETOROLAC TROMETHAMINE 15 MG: 30 INJECTION, SOLUTION INTRAMUSCULAR at 12:01

## 2023-01-07 RX ADMIN — PREDNISONE 60 MG: 20 TABLET ORAL at 12:02

## 2023-01-07 NOTE — ED PROVIDER NOTES
[facility] EMERGENCY DEPT  EMERGENCY DEPARTMENT ENCOUNTER       Pt Name: Sunitha Paige  MRN: [id number]  [place] 1955  Date of evaluation: 1/7/2023  Provider: Glorine Bamberger, PA   PCP: Mohamud Tobias MD  Note Started: 12:02 PM 1/7/23     ED attending involment: I have seen and evaluated the patient. My supervision physician was available for consultation. CHIEF COMPLAINT       Chief Complaint   Patient presents with    Back Pain    Leg Pain        HISTORY OF PRESENT ILLNESS: 1 or more elements      History From: Patient  HPI Limitations : None     Sunitha Paige is a 74 y.o. female who presents to the ED for evaluation of right low back pain that radiates down to her right leg. Patient states this is an acute on chronic issue for her. States she is followed by Dr. Tarci Doran for this for a long time. States she has had a steroid injection back in July, but denies history of back surgeries. States she is also been doing physical therapy for this, but just stopped. Denies any known trauma or injuries. States pain is an aching pain to her right buttock with a sharp and burning pain that radiates around and down her leg. States pain is worse with certain movements, and is hard to find a comfortable position to sleep the pain is been keeping her awake at night. States symptoms are characteristic of prior flares. Denies any bowel or bladder incontinence/retention, or saddle anesthesias. Denies any extremity numbness, weakness, or tingling. Denies urinary symptoms. Denies fevers, headache, CP, SOB, abdominal pain, N/V/D, blood in urine or stool. No additional exacerbating alleviating factors. No other complaints at this time. Nursing Notes were all reviewed and agreed with or any disagreements were addressed in the HPI. REVIEW OF SYSTEMS      Review of Systems   Constitutional:  Negative for appetite change, chills and fever. HENT:  Negative for congestion. Eyes:  Negative for pain. Respiratory:  Negative for cough and shortness of breath. Cardiovascular:  Negative for chest pain. Gastrointestinal:  Negative for abdominal pain, constipation, diarrhea, nausea and vomiting. Genitourinary:  Negative for decreased urine volume, difficulty urinating, dysuria, enuresis, frequency and urgency. Musculoskeletal:  Positive for back pain, gait problem and myalgias. Negative for neck pain and neck stiffness. Skin:  Negative for rash. Neurological:  Negative for syncope, weakness, numbness and headaches. All other systems reviewed and are negative. Positives and Pertinent negatives as per HPI.     PAST HISTORY     Past Medical History:  Past Medical History:   Diagnosis Date    DJD (degenerative joint disease) back 4/10/2010    and neck    DM (diabetes mellitus) ([facility] [address] 4/10/2010    Elevated cholesterol 4/10/2010    HTN (hypertension) 4/10/2010    Hypokalemia 4/10/2010    Unspecified vitamin D deficiency        Past Surgical History:  Past Surgical History:   Procedure Laterality Date    [facility]X [facility]  6/14/04    [address]    HX MOHS PROCEDURES  11/10/2015    right shoulder    HX ORTHOPAEDIC  4/29/13    LEFT ROTATOR CUFF REPAIR      HX ORTHOPAEDIC  06/20/13    shoulder manipulation for frozen shoulder    HX ORTHOPAEDIC  8/5/2014    pt states she had injection by ortho in right groin    HX ORTHOPAEDIC  2/16/2016    right shoulder manipulation    HX ORTHOPAEDIC  11/10/2015    right rotator cuff    HX ORTHOPAEDIC  02/25/2019    steroid injection L3 L4    HX ORTHOPAEDIC  11/04/2019    steroid injection- L3 L4    HX TONSILLECTOMY      age 31    OK COLONOSCOPY FLX DX W/COLLJ SPEC WHEN PFRMD  03/28/2008    Dr Smooth Solorzano       Family History:  Family History   Problem Relation Age of Onset    Hypertension Mother     Diabetes Mother     Kidney Disease Mother     Stroke Mother         TIA    Heart Attack Father     Bleeding Prob Father         taking coumadin    Bipolar Disorder Sister     Hypertension Sister     No Known Problems Sister        Social History:  Social History     Tobacco Use    Smoking status: Never    Smokeless tobacco: Never   Vaping Use    Vaping Use: Never used   Substance Use Topics    Alcohol use: No     Alcohol/week: 0.0 standard drinks    Drug use: No       Allergies: Allergies   Allergen Reactions    Latex Rash    Codeine Nausea and Vomiting    Keflex [Cephalexin] Nausea and Vomiting    Lipitor [Atorvastatin] Myalgia    Oxycodone Nausea and Vomiting    Percocet [Oxycodone-Acetaminophen] Nausea and Vomiting    Tessalon [Benzonatate] Hives and Itching    Tramadol Other (comments) and Nausea and Vomiting     dizziness    Vicodin [Hydrocodone-Acetaminophen] Nausea and Vomiting       CURRENT MEDICATIONS      Discharge Medication List as of 1/7/2023 12:01 PM        CONTINUE these medications which have NOT CHANGED    Details   pregabalin (LYRICA) 50 mg capsule Historical Med      rosuvastatin (CRESTOR) 20 mg tablet Take 1 Tablet by mouth nightly., Normal, Disp-90 Tablet, R-3      semaglutide (Rybelsus) 3 mg tablet Take 1 Tablet by mouth Daily (before breakfast). , Sample, Disp-30 Tablet, R-0      valsartan-hydroCHLOROthiazide (DIOVAN-HCT) 320-25 mg per tablet Take 1 Tablet by mouth daily. , Normal, Disp-30 Tablet, R-11      Janumet XR 50-1,000 mg TM24 TAKE 1 TABLET BY MOUTH TWICE DAILY WITH MEALS, Normal, Disp-60 Tablet, R-11      glimepiride (AMARYL) 2 mg tablet TAKE 1 TABLET BY MOUTH ONCE DAILY IN THE MORNING( REPLACING GLIPIZIDE), Normal, Disp-90 Tablet, R-3      cyclobenzaprine (FLEXERIL) 10 mg tablet Take 10 mg by mouth three (3) times daily as needed., Historical Med      co-enzyme Q-10 (CO Q-10) 100 mg capsule Take 100 mg by mouth every evening., Historical Med      cholecalciferol, vitamin D3, 50 mcg (2,000 unit) tab Take 1 Tab by mouth daily. , Historical Med      acetaminophen (TYLENOL) 500 mg tablet Take 500 mg by mouth every six (6) hours as needed for Pain., Historical Med cyanocobalamin 1,000 mcg tablet Take 1,000 mcg by mouth daily. , Historical Med      glucose blood VI test strips (ACCU-CHEK SMARTVIEW TEST STRIP) strip Use to check blood sugar twice daily and prn., Normal, Disp-200 Strip, R-3      Lancets (ACCU-CHEK FASTCLIX) misc Use to obtain blood sample for glucose testing daily and prn., Normal, Disp-100 Each, R-3             PHYSICAL EXAM      ED Triage Vitals [01/07/23 1029]   ED Encounter Vitals Group      BP (!) 172/75      Pulse (Heart Rate) 98      Resp Rate 16      Temp 98.1 °F (36.7 °C)      Temp src       O2 Sat (%) 100 %      Weight 195 lb      Height 5' 2\"        Physical Exam  Constitutional:       General: She is not in acute distress. Appearance: Normal appearance. She is not toxic-appearing. HENT:      Head: Normocephalic and atraumatic. Right Ear: External ear normal.      Left Ear: External ear normal.      Nose: Nose normal.      Mouth/Throat:      Mouth: Mucous membranes are moist.   Eyes:      Conjunctiva/sclera: Conjunctivae normal.   Cardiovascular:      Rate and Rhythm: Normal rate. Pulses: Normal pulses. Pulmonary:      Effort: Pulmonary effort is normal.   Abdominal:      General: There is no distension. Palpations: Abdomen is soft. There is no mass. Tenderness: There is no abdominal tenderness. There is no right CVA tenderness, left CVA tenderness, guarding or rebound. Musculoskeletal:         General: Normal range of motion. Cervical back: Normal range of motion. Right lower leg: No edema. Left lower leg: No edema. Comments: TTP to right mid glute/buttock. No midline tenderness. +SLR. Good range of motion. No palpable deformities. Distal sensation equal and intact. No tenderness to knee or foot. Negative Homans' sign. Compartments soft. Brisk capillary refill. Strong DP and PT pulses bilaterally. Ambulatory with normal gait. Skin:     General: Skin is warm and dry.    Neurological: General: No focal deficit present. Mental Status: She is alert and oriented to person, place, and time. Psychiatric:         Mood and Affect: Mood normal.         Behavior: Behavior normal.        DIAGNOSTIC RESULTS   LABS:     No results found for this or any previous visit (from the past 12 hour(s)). RADIOLOGY:  Non-plain film images such as CT, Ultrasound and MRI are read by the radiologist. Plain radiographic images are visualized and preliminarily interpreted by the ED Provider with the below findings:     N/A     Interpretation per the Radiologist below, if available at the time of this note:     No results found. PROCEDURES   Unless otherwise noted below, none  Procedures     N/A  EMERGENCY DEPARTMENT COURSE and DIFFERENTIAL DIAGNOSIS/MDM   Vitals:    Vitals:    01/07/23 1029   BP: (!) 172/75   Pulse: 98   Resp: 16   Temp: 98.1 °F (36.7 °C)   SpO2: 100%   Weight: 88.5 kg (195 lb)   Height: 5' 2\" (1.575 m)        Patient was given the following medications:  Medications   ketorolac (TORADOL) injection 15 mg (15 mg IntraMUSCular Given 1/7/23 1201)   predniSONE (DELTASONE) tablet 60 mg (60 mg Oral Given 1/7/23 1202)       CONSULTS: (Who and What was discussed)  None    Chronic Conditions: DJD, lumbar radiculopathy, HTN    Social Determinants affecting Dx or Tx: None    Records Reviewed (source and summary): Nursing Notes, Old Medical Records, and Previous Radiology Studies    CC/HPI Summary, DDx, ED Course, and Reassessment:   Patient is a pleasant well-appearing 45-year-old female who presents to the ED for evaluation of right low back pain that radiates down to her right leg. Patient states this is an acute on chronic issue for her. States she is followed by Dr. Fletcher Mann at [facility] for this. Had also been undergoing physical therapy which she just recently stopped. Patient states symptoms are characteristic of prior flares. Denies any known trauma or injuries.     Shared decision-making form and care plan created together, patient has had plain films and MRIs of her lumbar spine in the past, and she defers repeat imaging at this time. Given the fact she does not have any new trauma, I feel this is reasonable. Patient elects conservative evaluation and management would just like something for pain control. Will place on short course steroid taper. We will also advised her to discontinue her Flexeril and attempt Robaxin to try different muscle relaxer. Patient is already on NSAIDs. Discussed medication use and side effect profile, advised monitoring blood sugars closely. Advised to follow-up with PCP and Dr. Nicko Heath. Verbal return precautions advised. Patient is happy with this plan and verbalizes understanding and agreement. History and physical exam consistent with musculoskeletal etiology. No neurological deficits such as bowel or bladder dysfunction or new weakness or numbness in the lower extremities. Based on history and physical exam there is a low suspicion for vascular etiology such as aortic aneurysm, aortic dissection, mesenteric ischemia, epidural abscess, osteomyelitis, meningitis, metastatic cancer, acute bacterial infection, or other emergent conditions going further evaluation/management acutely at this time. FINAL IMPRESSION     1. Lumbar radiculopathy    2. Acute exacerbation of chronic low back pain    3.  Elevated blood pressure reading          DISPOSITION/PLAN   Discharged                   PATIENT REFERRED TO:  Follow-up Information       Follow up With Specialties Details Why Contact Info    [facility] EMERGENCY DEPT Emergency Medicine  As needed, If symptoms worsen [address] Gu[address] Russ Palacios MD Physical Medicine and Rehabilitation Physician   [address] [facility]  [address]  [place] [address]  [phone]      Reese Pearce MD Family Medicine In 1 week  [address] [facility]  [phone]                DISCHARGE MEDICATIONS:  Discharge Medication List as of 1/7/2023 12:01 PM        START taking these medications    Details   predniSONE (STERAPRED DS) 10 mg dose pack Take as instructed on pack, Normal, Disp-21 Tablet, R-0      methocarbamoL (Robaxin-750) 750 mg tablet Take 1 Tablet by mouth four (4) times daily. , Normal, Disp-30 Tablet, R-0           CONTINUE these medications which have NOT CHANGED    Details   pregabalin (LYRICA) 50 mg capsule Historical Med      rosuvastatin (CRESTOR) 20 mg tablet Take 1 Tablet by mouth nightly., Normal, Disp-90 Tablet, R-3      semaglutide (Rybelsus) 3 mg tablet Take 1 Tablet by mouth Daily (before breakfast). , Sample, Disp-30 Tablet, R-0      valsartan-hydroCHLOROthiazide (DIOVAN-HCT) 320-25 mg per tablet Take 1 Tablet by mouth daily. , Normal, Disp-30 Tablet, R-11      Janumet XR 50-1,000 mg TM24 TAKE 1 TABLET BY MOUTH TWICE DAILY WITH MEALS, Normal, Disp-60 Tablet, R-11      glimepiride (AMARYL) 2 mg tablet TAKE 1 TABLET BY MOUTH ONCE DAILY IN THE MORNING( REPLACING GLIPIZIDE), Normal, Disp-90 Tablet, R-3      cyclobenzaprine (FLEXERIL) 10 mg tablet Take 10 mg by mouth three (3) times daily as needed., Historical Med      co-enzyme Q-10 (CO Q-10) 100 mg capsule Take 100 mg by mouth every evening., Historical Med      cholecalciferol, vitamin D3, 50 mcg (2,000 unit) tab Take 1 Tab by mouth daily. , Historical Med      acetaminophen (TYLENOL) 500 mg tablet Take 500 mg by mouth every six (6) hours as needed for Pain., Historical Med      cyanocobalamin 1,000 mcg tablet Take 1,000 mcg by mouth daily. , Historical Med      glucose blood VI test strips (ACCU-CHEK SMARTVIEW TEST STRIP) strip Use to check blood sugar twice daily and prn., Normal, Disp-200 Strip, R-3      Lancets (ACCU-CHEK FASTCLIX) misc Use to obtain blood sample for glucose testing daily and prn., Normal, Disp-100 Each, R-3               DISCONTINUED MEDICATIONS:  Discharge Medication List as of 1/7/2023 12:01 PM          I am the Primary Clinician of Record. NYDIA العلي (electronically signed)    (Please note that parts of this dictation were completed with voice recognition software. Quite often unanticipated grammatical, syntax, homophones, and other interpretive errors are inadvertently transcribed by the computer software. Please disregards these errors.  Please excuse any errors that have escaped final proofreading.)

## 2023-01-07 NOTE — DISCHARGE INSTRUCTIONS
Take Robaxin instead of Flexeril (both are muscle relaxers)  Steroid taper as prescribed, starting tomorrow as your first dose was given to you here in the ER. Thank You! It was a pleasure taking care of you in our Emergency Department today. We know that when you come to [facility], you are entrusting us with your health, comfort, and safety. Our clinicians honor that trust, and truly appreciate the opportunity to care for you and your loved ones. We also value your feedback. If you receive a survey about your Emergency Department experience today, please fill it out. We care about our patients' feedback, and we listen to what you have to say. Thank you.     Lorren Kanner PA-C

## 2023-01-07 NOTE — ED TRIAGE NOTES
Pt arrives to ed w reports of lower back pain radiaiting to RLE. Hx sciatica. Took tylenol w/o relief.

## 2023-01-17 NOTE — PROGRESS NOTES
HPI:  Pt presenting for one month DM2 follow up. Started Rybelsus (A1c was 7.2%) and increased statin dose last visit, provided PAP application for [place] nordisk as well. Pt remained on janumet and glimepiride with plans to consider plain metformin xr if she continues taking the Rybelsus. Also planned for full med rec this visit. Since that visit pt was in the ED for back pain which seemed to be related to her chronic condition (prednisone and muscle relaxer); SBP was elevated at ED visit. S/O:  Pt states pain is better since ED; inflammation improved; finished her steroids last week. Had some low sugars last week when she was in pain, was 90   Taking Janumet XR 50 mg / 1000 mg 2 tablets after breakfast   Stopped glimepiride after last visit   Sugars have been 125, 130 - does not have meter or log - checks after eating lunch   States even though on prednisone highest she saw was 135  Not taking Rybelsus as does not want to start something new with everything going on with her pain. She is not going to consider surgery for this pain, as her sister had issues with a surgery. Hasn't eaten this AM.   FBG is 150   Pt is upset about this number as she has never seen a value this high. She did eat pasta last night at 7 PM, discussed how this could be one reason her FBG is higher this AM.   Drank water, no snacks or dessert even though she wanted ice cream   While on steroids, she ate less as didn't want it to raise her sugar     Went to [employer] over [facility] and had a great time - trip with her son who got a ravin for his school as a principle (now at [employer])   Ate well on the trip; did eat fries and burger one time, but then focused in on eating smaller portions     Sees Dr. Loli Grant in Feb when a new A1c will be done, she is concerned about it being higher given steroids. Diabetes is stable, not wanting to start Rybelsus currently.    Talked to pt about walking and weight loss (one benefit of Rybelsus I re discussed with patient); for now she will continue current therapy until she gets her next A1c checked. She still has the samples of Rybelsus (although she did not fill out the PAP pako which we will need to do if she does start Rybelsus in the future). Will book appt with me 4 weeks after PCP     Patient verbalized understanding of information presented.   Vandana Bustos, PHARMD, [address] [facility]    Program: Medical Group  CPA in place: Yes  Recommendation Provided To: Patient/Caregiver: 2 via In person  Intervention Detail: Adherence Monitorin and Scheduled Appointment  Intervention Accepted By: Patient/Caregiver: 2  Time Spent (min): 60

## 2023-01-18 ENCOUNTER — OFFICE VISIT (OUTPATIENT)
Dept: FAMILY MEDICINE CLINIC | Age: 68
End: 2023-01-18

## 2023-01-18 VITALS — DIASTOLIC BLOOD PRESSURE: 84 MMHG | SYSTOLIC BLOOD PRESSURE: 138 MMHG

## 2023-01-18 DIAGNOSIS — E11.9 TYPE 2 DIABETES MELLITUS WITHOUT COMPLICATION, UNSPECIFIED WHETHER LONG TERM INSULIN USE (HCC): Primary | ICD-10-CM

## 2023-02-28 ENCOUNTER — OFFICE VISIT (OUTPATIENT)
Dept: FAMILY MEDICINE CLINIC | Age: 68
End: 2023-02-28
Payer: MEDICARE

## 2023-02-28 VITALS
HEIGHT: 62 IN | TEMPERATURE: 97.6 F | HEART RATE: 76 BPM | DIASTOLIC BLOOD PRESSURE: 73 MMHG | SYSTOLIC BLOOD PRESSURE: 133 MMHG | WEIGHT: 196.2 LBS | RESPIRATION RATE: 14 BRPM | OXYGEN SATURATION: 100 % | BODY MASS INDEX: 36.1 KG/M2

## 2023-02-28 DIAGNOSIS — E78.2 MIXED HYPERLIPIDEMIA: ICD-10-CM

## 2023-02-28 DIAGNOSIS — G89.29 CHRONIC RIGHT-SIDED LOW BACK PAIN WITH RIGHT-SIDED SCIATICA: ICD-10-CM

## 2023-02-28 DIAGNOSIS — I10 ESSENTIAL HYPERTENSION: Primary | ICD-10-CM

## 2023-02-28 DIAGNOSIS — Z79.4 TYPE 2 DIABETES MELLITUS WITH DIABETIC POLYNEUROPATHY, WITH LONG-TERM CURRENT USE OF INSULIN (HCC): ICD-10-CM

## 2023-02-28 DIAGNOSIS — E11.42 TYPE 2 DIABETES MELLITUS WITH DIABETIC POLYNEUROPATHY, WITH LONG-TERM CURRENT USE OF INSULIN (HCC): ICD-10-CM

## 2023-02-28 DIAGNOSIS — M54.41 CHRONIC RIGHT-SIDED LOW BACK PAIN WITH RIGHT-SIDED SCIATICA: ICD-10-CM

## 2023-02-28 DIAGNOSIS — M15.9 PRIMARY OSTEOARTHRITIS INVOLVING MULTIPLE JOINTS: ICD-10-CM

## 2023-02-28 DIAGNOSIS — Z51.81 ENCOUNTER FOR MEDICATION MONITORING: ICD-10-CM

## 2023-02-28 DIAGNOSIS — E66.9 NON MORBID OBESITY: ICD-10-CM

## 2023-02-28 LAB
GLUCOSE POC: 124 MG/DL
HBA1C MFR BLD HPLC: 7.1 %

## 2023-02-28 PROCEDURE — 82947 ASSAY GLUCOSE BLOOD QUANT: CPT | Performed by: FAMILY MEDICINE

## 2023-02-28 PROCEDURE — G9899 SCRN MAM PERF RSLTS DOC: HCPCS | Performed by: FAMILY MEDICINE

## 2023-02-28 PROCEDURE — G8510 SCR DEP NEG, NO PLAN REQD: HCPCS | Performed by: FAMILY MEDICINE

## 2023-02-28 PROCEDURE — 3078F DIAST BP <80 MM HG: CPT | Performed by: FAMILY MEDICINE

## 2023-02-28 PROCEDURE — 1123F ACP DISCUSS/DSCN MKR DOCD: CPT | Performed by: FAMILY MEDICINE

## 2023-02-28 PROCEDURE — G8399 PT W/DXA RESULTS DOCUMENT: HCPCS | Performed by: FAMILY MEDICINE

## 2023-02-28 PROCEDURE — 1090F PRES/ABSN URINE INCON ASSESS: CPT | Performed by: FAMILY MEDICINE

## 2023-02-28 PROCEDURE — 3075F SYST BP GE 130 - 139MM HG: CPT | Performed by: FAMILY MEDICINE

## 2023-02-28 PROCEDURE — 3051F HG A1C>EQUAL 7.0%<8.0%: CPT | Performed by: FAMILY MEDICINE

## 2023-02-28 PROCEDURE — 2022F DILAT RTA XM EVC RTNOPTHY: CPT | Performed by: FAMILY MEDICINE

## 2023-02-28 PROCEDURE — G8536 NO DOC ELDER MAL SCRN: HCPCS | Performed by: FAMILY MEDICINE

## 2023-02-28 PROCEDURE — G8417 CALC BMI ABV UP PARAM F/U: HCPCS | Performed by: FAMILY MEDICINE

## 2023-02-28 PROCEDURE — G0463 HOSPITAL OUTPT CLINIC VISIT: HCPCS | Performed by: FAMILY MEDICINE

## 2023-02-28 PROCEDURE — 99214 OFFICE O/P EST MOD 30 MIN: CPT | Performed by: FAMILY MEDICINE

## 2023-02-28 PROCEDURE — G8427 DOCREV CUR MEDS BY ELIG CLIN: HCPCS | Performed by: FAMILY MEDICINE

## 2023-02-28 PROCEDURE — 83036 HEMOGLOBIN GLYCOSYLATED A1C: CPT | Performed by: FAMILY MEDICINE

## 2023-02-28 PROCEDURE — 1101F PT FALLS ASSESS-DOCD LE1/YR: CPT | Performed by: FAMILY MEDICINE

## 2023-02-28 PROCEDURE — 3017F COLORECTAL CA SCREEN DOC REV: CPT | Performed by: FAMILY MEDICINE

## 2023-02-28 NOTE — PROGRESS NOTES
Patient identified by 2 identifiers. Chief Complaint   Patient presents with    Follow-up    Diabetes     1. Have you been to the ER, urgent care clinic since your last visit? Hospitalized since your last visit? Yes Where: [facility] 1/7/23    2. Have you seen or consulted any other health care providers outside of the [address] [place] [address] since your last visit? Include any pap smears or colon screening.  No

## 2023-02-28 NOTE — PROGRESS NOTES
HISTORY OF PRESENT ILLNESS  Rayna Lilly is a 74 y.o. female. HPI   Follow up on chronic medical problems. Overall feeling well. HTN follow up:  Compliant w/ meds, low salt diet, and exercise. No home bp monitoring. No swelling, headache or dizziness. No chest pain, SOB, palpitations. Otherwise feeling well since the last visit. DM type II follow up:  Compliant w/ meds, diabetic diet, and exercise. Tolerating rybelsus. Just started the beginning of this month. Obtains home glucose monitoring averaging 100-140. Checks BS daily on most days and prn. Pt does not have BS log at visit today. No Rf needed for today. Denies any tingling sensation, polyuria and polydipsia. No blurred vision. No significant weight changes. Feeling well since last OV. Hypercholesterolemia follow up:  Compliant w/ low fat, low cholesterol diet. Exercising some. Off of Lipitor d/t muscle aching. We discussed restarting with another statin pending her results from today. Osteoarthritis:  Patient has osteoarthritis. Has migratory joint aching and stiffness that comes and goes. She has been taking motrin for the pain. Also add lyrica which has helped some. Pain is 7-8/10. She has been using ice which she prefers over heat. Seen in the ER on last month for lower back pain and sciatica pain down the right left. Overall the pain is improved. Still has pain in the low right back into the right buttock. Has jocy using heat. Interested in doing PT. Symptoms onset: problem is longstanding. Rheumatological ROS: stable, mild-to-moderate joint symptoms intermittently, reasonably well controlled by PRN meds. Response to treatment plan: stable and intermittent.    HM:  Mammogram  11/1/22  Colonoscopy 4/25/2018 by Dr. Aleyda Fisher- repeat in 10 years     Patient Active Problem List   Diagnosis Code    DJD (degenerative joint disease) back M19.90    Hypokalemia E87.6    History of hysterectomy, supracervical Z90.711 Mixed hyperlipidemia E78.2    Rotator cuff tear M75.100    Unspecified vitamin D deficiency E55.9    Essential hypertension I10    Type 2 diabetes mellitus without complication ([facility]) M06.3    Other osteoarthritis of spine, lumbar region M47.896    Encounter for medication monitoring Z51.81    Rotator cuff arthropathy M12.819    S/P rotator cuff repair Z98.890    Severe obesity (BMI 35.0-39. 9) with comorbidity ([facility]) E66.01    Type 2 diabetes mellitus with diabetic neuropathy ([facility]) E11.40       Current Outpatient Medications   Medication Sig Dispense Refill    semaglutide (Rybelsus) 3 mg tablet Take 3 mg by mouth Daily (before breakfast). rosuvastatin (CRESTOR) 20 mg tablet Take 1 Tablet by mouth nightly. 90 Tablet 3    valsartan-hydroCHLOROthiazide (DIOVAN-HCT) 320-25 mg per tablet Take 1 Tablet by mouth daily. 30 Tablet 11    Janumet XR 50-1,000 mg TM24 TAKE 1 TABLET BY MOUTH TWICE DAILY WITH MEALS 60 Tablet 11    co-enzyme Q-10 (CO Q-10) 100 mg capsule Take 100 mg by mouth every evening. cholecalciferol, vitamin D3, 50 mcg (2,000 unit) tab Take 1 Tab by mouth daily. acetaminophen (TYLENOL) 500 mg tablet Take 500 mg by mouth every six (6) hours as needed for Pain. cyanocobalamin 1,000 mcg tablet Take 1,000 mcg by mouth daily. glucose blood VI test strips (ACCU-CHEK SMARTVIEW TEST STRIP) strip Use to check blood sugar twice daily and prn. 200 Strip 3    Lancets (ACCU-CHEK FASTCLIX) misc Use to obtain blood sample for glucose testing daily and prn. 100 Each 3    pregabalin (LYRICA) 50 mg capsule Take 50 mg by mouth two (2) times a day.  (Patient not taking: Reported on 2/28/2023)         Allergies   Allergen Reactions    Latex Rash    Codeine Nausea and Vomiting    Keflex [Cephalexin] Nausea and Vomiting    Lipitor [Atorvastatin] Myalgia    Oxycodone Nausea and Vomiting    Percocet [Oxycodone-Acetaminophen] Nausea and Vomiting    Tessalon [Benzonatate] Hives and Itching    Tramadol Other (comments) and Nausea and Vomiting     dizziness    Vicodin [Hydrocodone-Acetaminophen] Nausea and Vomiting           Past Medical History:   Diagnosis Date    DJD (degenerative joint disease) back 4/10/2010    and neck    DM (diabetes mellitus) ([facility] [address] 4/10/2010    Elevated cholesterol 4/10/2010    HTN (hypertension) 4/10/2010    Hypokalemia 4/10/2010    Unspecified vitamin D deficiency            Past Surgical History:   Procedure Laterality Date    HX HYSTERECTOMY  6/14/04    [address]    HX MOHS PROCEDURES  11/10/2015    right shoulder    HX ORTHOPAEDIC  4/29/13    LEFT ROTATOR CUFF REPAIR      HX ORTHOPAEDIC  06/20/13    shoulder manipulation for frozen shoulder    HX ORTHOPAEDIC  8/5/2014    pt states she had injection by ortho in right groin    HX ORTHOPAEDIC  2/16/2016    right shoulder manipulation    HX ORTHOPAEDIC  11/10/2015    right rotator cuff    HX ORTHOPAEDIC  02/25/2019    steroid injection L3 L4    HX ORTHOPAEDIC  11/04/2019    steroid injection- L3 L4    HX TONSILLECTOMY      age 31    [place] COLONOSCOPY FLX DX W/COLLJ SPEC WHEN PFRMD  03/28/2008    Dr Rosa Maria Hogan           Family History   Problem Relation Age of Onset    Hypertension Mother     Diabetes Mother     Kidney Disease Mother     Stroke Mother         TIA    Heart Attack Father     Bleeding Prob Father         taking coumadin    Bipolar Disorder Sister     Hypertension Sister     No Known Problems Sister        Social History     Tobacco Use    Smoking status: Never    Smokeless tobacco: Never   Substance Use Topics    Alcohol use: No     Alcohol/week: 0.0 standard drinks        Lab Results   Component Value Date/Time    WBC 6.6 06/29/2022 12:47 PM    HGB 13.0 06/29/2022 12:47 PM    HCT 40.1 06/29/2022 12:47 PM    PLATELET 625 51/14/9503 12:47 PM    MCV 86.1 06/29/2022 12:47 PM     Lab Results   Component Value Date/Time    Cholesterol, total 163 11/29/2022 12:02 PM    HDL Cholesterol 55 11/29/2022 12:02 PM    LDL, calculated 86 11/29/2022 12:02 PM Triglyceride 110 11/29/2022 12:02 PM    CHOL/HDL Ratio 3.0 11/29/2022 12:02 PM     Lab Results   Component Value Date/Time    TSH 1.020 08/02/2017 09:55 AM      Lab Results   Component Value Date/Time    Sodium 141 11/29/2022 12:02 PM    Potassium 4.1 11/29/2022 12:02 PM    Chloride 105 11/29/2022 12:02 PM    CO2 31 11/29/2022 12:02 PM    Anion gap 5 11/29/2022 12:02 PM    Glucose 117 (H) 11/29/2022 12:02 PM    BUN 16 11/29/2022 12:02 PM    Creatinine 0.77 11/29/2022 12:02 PM    BUN/Creatinine ratio 21 (H) 11/29/2022 12:02 PM    GFR est AA >60 06/29/2022 12:47 PM    GFR est non-AA >60 06/29/2022 12:47 PM    Calcium 9.6 11/29/2022 12:02 PM    Bilirubin, total 0.9 11/29/2022 12:02 PM    ALT (SGPT) 26 11/29/2022 12:02 PM    Alk. phosphatase 100 11/29/2022 12:02 PM    Protein, total 7.8 11/29/2022 12:02 PM    Albumin 4.2 11/29/2022 12:02 PM    Globulin 3.6 11/29/2022 12:02 PM    A-G Ratio 1.2 11/29/2022 12:02 PM      Lab Results   Component Value Date/Time    Hemoglobin A1c 6.8 (H) 04/06/2021 11:04 AM    Hemoglobin A1c (POC) 7.1 02/28/2023 11:40 AM         Review of Systems   Constitutional:  Negative for malaise/fatigue. HENT:  Negative for congestion. Eyes:  Negative for blurred vision. Respiratory:  Negative for cough and shortness of breath. Cardiovascular:  Negative for chest pain, palpitations and leg swelling. Gastrointestinal:  Negative for abdominal pain, constipation and heartburn. Genitourinary:  Negative for dysuria, frequency and urgency. Musculoskeletal:  Positive for back pain and joint pain. Neurological:  Negative for dizziness, tingling and headaches. Endo/Heme/Allergies:  Negative for environmental allergies. Psychiatric/Behavioral:  Negative for depression. The patient does not have insomnia. Physical Exam  Vitals and nursing note reviewed. Constitutional:       Appearance: Normal appearance. She is well-developed.       Comments: /73   Pulse 76   Temp 97.6 °F (36.4 °C)   Resp 14   Ht 5' 2\" (1.575 m)   Wt 196 lb 3.2 oz (89 kg)   LMP 06/14/2004   SpO2 100%   BMI 35.89 kg/m²    Neck:      Thyroid: No thyromegaly. Cardiovascular:      Rate and Rhythm: Normal rate and regular rhythm. Heart sounds: Normal heart sounds. No gallop. Pulmonary:      Effort: Pulmonary effort is normal.      Breath sounds: Normal breath sounds. Musculoskeletal:      Lumbar back: Tenderness (right sided lower back into the right buttock tenderness.) present. Normal range of motion. Right lower leg: No edema. Left lower leg: No edema. Neurological:      Mental Status: She is alert. ASSESSMENT and PLAN  Diagnoses and all orders for this visit:    1. Essential hypertension  Discussed sodium restriction, high k rich diet, maintaining ideal body weight and regular exercise program such as daily walking 30 min perday 4-5 times per week, as physiologic means to achieve blood pressure control. Medication compliance advised. 2. Type 2 diabetes mellitus with diabetic polyneuropathy, with long-term current use of insulin ([facility])  A1c at 7.1%. Had steroids on last month with the sciatica pain that also had elevated BS for a couple of weeks. -     AMB POC HEMOGLOBIN A1C  -     AMB POC GLUCOSE, QUANTITATIVE, BLOOD    3. Mixed hyperlipidemia  Continue to monitor. Work on diet and exercise. 4. Primary osteoarthritis involving multiple joints  Overall stable    5. Chronic right-sided low back pain with right-sided sciatica  -     REFERRAL TO PHYSICAL THERAPY    6. Encounter for medication monitoring    7. Non morbid obesity  I have reviewed/discussed the above normal BMI with the patient. I have recommended the following interventions: dietary management education, guidance, and counseling .          Follow-up and Dispositions    Return in about 3 months (around 5/28/2023) for medicare wellness exam.       reviewed diet, exercise and weight control  cardiovascular risk and specific lipid/LDL goals reviewed  reviewed medications and side effects in detail  specific diabetic recommendations: low cholesterol diet, weight control and daily exercise discussed, all medications, side effects and compliance discussed carefully, foot care discussed and Podiatry visits discussed, annual eye examinations at Ophthalmology discussed, and glycohemoglobin and other lab monitoring discussed    I have discussed diagnosis listed in this note with pt and/or family. I have discussed treatment plans and options and the risk/benefit analysis of those options, including safe use of medications and possible medication side effects. Through the use of shared decision making we have agreed to the above plan. The patient has received an after-visit summary and questions were answered concerning future plans and follow up. Advise pt of any urgent changes then to proceed to the ER.

## 2023-03-07 ENCOUNTER — TELEPHONE (OUTPATIENT)
Dept: FAMILY MEDICINE CLINIC | Age: 68
End: 2023-03-07

## 2023-03-07 DIAGNOSIS — E11.42 TYPE 2 DIABETES MELLITUS WITH DIABETIC POLYNEUROPATHY, WITH LONG-TERM CURRENT USE OF INSULIN (HCC): ICD-10-CM

## 2023-03-07 DIAGNOSIS — Z79.4 TYPE 2 DIABETES MELLITUS WITH DIABETIC POLYNEUROPATHY, WITH LONG-TERM CURRENT USE OF INSULIN (HCC): ICD-10-CM

## 2023-03-07 NOTE — TELEPHONE ENCOUNTER
I contacted patient, name and  were verified. Patient does not want to submit the info for patient assistance for this medication. She would like this to be sent to her mailorder and she is aware the cost will be high. Last Visit: 23 with MD Beth Castillo  Next Appointment: 23 with MD Torres    Requested Prescriptions     Pending Prescriptions Disp Refills    semaglutide (Rybelsus) 3 mg tablet 90 Tablet 1     Sig: Take 1 Tablet by mouth Daily (before breakfast).

## 2023-03-07 NOTE — TELEPHONE ENCOUNTER
Patient left a message requesting to speak with Chandrakant Gutierrez in regards to the Rybelsus. She said she has #3 pills remaining. For Pharmacy Admin Tracking Only    Program: Medication Refill  CPA in place:   Recommendation Provided To:    Intervention Detail: New Rx: 1, reason: Patient Preference  Intervention Accepted By:   David Salazar Closed?:   Time Spent (min): 5

## 2023-03-09 DIAGNOSIS — E11.42 TYPE 2 DIABETES MELLITUS WITH DIABETIC POLYNEUROPATHY, WITH LONG-TERM CURRENT USE OF INSULIN (HCC): ICD-10-CM

## 2023-03-09 DIAGNOSIS — Z79.4 TYPE 2 DIABETES MELLITUS WITH DIABETIC POLYNEUROPATHY, WITH LONG-TERM CURRENT USE OF INSULIN (HCC): ICD-10-CM

## 2023-03-09 NOTE — TELEPHONE ENCOUNTER
Last Visit: 2/28/23 with MD Janelle Beltran  Next Appointment: 5/31/23 with MD Torres    Requested Prescriptions     Pending Prescriptions Disp Refills    semaglutide (Rybelsus) 3 mg tablet 90 Tablet 1     Sig: Take 1 Tablet by mouth Daily (before breakfast). For Pharmacy Admin Tracking Only    Program: Medication Refill  CPA in place:   Recommendation Provided To:    Intervention Detail: New Rx: 1, reason: Patient Preference  Intervention Accepted By:   Naif Rawlins Closed?:   Time Spent (min): 5

## 2023-03-14 ENCOUNTER — TELEPHONE (OUTPATIENT)
Dept: FAMILY MEDICINE CLINIC | Age: 68
End: 2023-03-14

## 2023-03-14 NOTE — TELEPHONE ENCOUNTER
Pharmacy is asking for clarification regarding the Rybelsus. Per clinical guidelines, the 3mg dose is intended for initial titration and is not effective for glycemic control. After 30 days, increase the dose to 7mg /day. If additional glycemic control is needed after at least 30 days on the 7mg dose, may increase to 14mg/day. Please advise them at [phone] and use case #[id number]. For Pharmacy Admin Tracking Only    Program: Medication Refill  CPA in place: Recommendation Provided To:  Intervention Detail: New Rx: 1, reason: Needs Additional TherapyIntervention Accepted By: Gap Closed?: Time Spent (min): 5

## 2023-04-26 ENCOUNTER — OFFICE VISIT (OUTPATIENT)
Dept: FAMILY MEDICINE CLINIC | Age: 68
End: 2023-04-26

## 2023-04-26 VITALS — BODY MASS INDEX: 36.58 KG/M2 | WEIGHT: 200 LBS

## 2023-04-26 DIAGNOSIS — E11.9 TYPE 2 DIABETES MELLITUS WITHOUT COMPLICATION, UNSPECIFIED WHETHER LONG TERM INSULIN USE (HCC): Primary | ICD-10-CM

## 2023-05-31 ENCOUNTER — OFFICE VISIT (OUTPATIENT)
Age: 68
End: 2023-05-31
Payer: MEDICARE

## 2023-05-31 VITALS
WEIGHT: 197.2 LBS | TEMPERATURE: 98.1 F | DIASTOLIC BLOOD PRESSURE: 76 MMHG | OXYGEN SATURATION: 100 % | BODY MASS INDEX: 38.72 KG/M2 | SYSTOLIC BLOOD PRESSURE: 150 MMHG | HEIGHT: 60 IN | HEART RATE: 81 BPM | RESPIRATION RATE: 18 BRPM

## 2023-05-31 DIAGNOSIS — I10 ESSENTIAL (PRIMARY) HYPERTENSION: ICD-10-CM

## 2023-05-31 DIAGNOSIS — E78.2 MIXED HYPERLIPIDEMIA: ICD-10-CM

## 2023-05-31 DIAGNOSIS — R94.31 ABNORMAL EKG: ICD-10-CM

## 2023-05-31 DIAGNOSIS — E66.9 NON MORBID OBESITY: ICD-10-CM

## 2023-05-31 DIAGNOSIS — Z79.899 ENCOUNTER FOR LONG-TERM (CURRENT) USE OF MEDICATIONS: ICD-10-CM

## 2023-05-31 DIAGNOSIS — E11.42 TYPE 2 DIABETES MELLITUS WITH DIABETIC POLYNEUROPATHY, WITHOUT LONG-TERM CURRENT USE OF INSULIN (HCC): ICD-10-CM

## 2023-05-31 DIAGNOSIS — M15.9 PRIMARY OSTEOARTHRITIS INVOLVING MULTIPLE JOINTS: ICD-10-CM

## 2023-05-31 DIAGNOSIS — Z00.00 WELCOME TO MEDICARE PREVENTIVE VISIT: Primary | ICD-10-CM

## 2023-05-31 LAB
GLUCOSE, POC: 103 MG/DL
HBA1C MFR BLD: 6.6 %

## 2023-05-31 PROCEDURE — G8417 CALC BMI ABV UP PARAM F/U: HCPCS | Performed by: FAMILY MEDICINE

## 2023-05-31 PROCEDURE — 93010 ELECTROCARDIOGRAM REPORT: CPT | Performed by: FAMILY MEDICINE

## 2023-05-31 PROCEDURE — G8399 PT W/DXA RESULTS DOCUMENT: HCPCS | Performed by: FAMILY MEDICINE

## 2023-05-31 PROCEDURE — 3046F HEMOGLOBIN A1C LEVEL >9.0%: CPT | Performed by: FAMILY MEDICINE

## 2023-05-31 PROCEDURE — PBSHW AMB POC HEMOGLOBIN A1C: Performed by: FAMILY MEDICINE

## 2023-05-31 PROCEDURE — 3077F SYST BP >= 140 MM HG: CPT | Performed by: FAMILY MEDICINE

## 2023-05-31 PROCEDURE — 2022F DILAT RTA XM EVC RTNOPTHY: CPT | Performed by: FAMILY MEDICINE

## 2023-05-31 PROCEDURE — 3078F DIAST BP <80 MM HG: CPT | Performed by: FAMILY MEDICINE

## 2023-05-31 PROCEDURE — 93005 ELECTROCARDIOGRAM TRACING: CPT | Performed by: FAMILY MEDICINE

## 2023-05-31 PROCEDURE — G8427 DOCREV CUR MEDS BY ELIG CLIN: HCPCS | Performed by: FAMILY MEDICINE

## 2023-05-31 PROCEDURE — 99214 OFFICE O/P EST MOD 30 MIN: CPT | Performed by: FAMILY MEDICINE

## 2023-05-31 PROCEDURE — 3017F COLORECTAL CA SCREEN DOC REV: CPT | Performed by: FAMILY MEDICINE

## 2023-05-31 PROCEDURE — 1036F TOBACCO NON-USER: CPT | Performed by: FAMILY MEDICINE

## 2023-05-31 PROCEDURE — 1123F ACP DISCUSS/DSCN MKR DOCD: CPT | Performed by: FAMILY MEDICINE

## 2023-05-31 PROCEDURE — 1090F PRES/ABSN URINE INCON ASSESS: CPT | Performed by: FAMILY MEDICINE

## 2023-05-31 PROCEDURE — 82962 GLUCOSE BLOOD TEST: CPT | Performed by: FAMILY MEDICINE

## 2023-05-31 PROCEDURE — PBSHW AMB POC GLUCOSE BLOOD, BY GLUCOSE MONITORING DEVICE: Performed by: FAMILY MEDICINE

## 2023-05-31 PROCEDURE — 83036 HEMOGLOBIN GLYCOSYLATED A1C: CPT | Performed by: FAMILY MEDICINE

## 2023-05-31 PROCEDURE — G0438 PPPS, INITIAL VISIT: HCPCS | Performed by: FAMILY MEDICINE

## 2023-05-31 RX ORDER — METOPROLOL SUCCINATE 25 MG/1
25 TABLET, EXTENDED RELEASE ORAL DAILY
Qty: 30 TABLET | Refills: 3 | Status: SHIPPED | OUTPATIENT
Start: 2023-05-31

## 2023-05-31 RX ORDER — ORAL SEMAGLUTIDE 7 MG/1
7 TABLET ORAL DAILY
Qty: 90 TABLET | Refills: 3 | Status: SHIPPED | OUTPATIENT
Start: 2023-05-31

## 2023-05-31 SDOH — ECONOMIC STABILITY: HOUSING INSECURITY
IN THE LAST 12 MONTHS, WAS THERE A TIME WHEN YOU DID NOT HAVE A STEADY PLACE TO SLEEP OR SLEPT IN A SHELTER (INCLUDING NOW)?: NO

## 2023-05-31 SDOH — ECONOMIC STABILITY: FOOD INSECURITY: WITHIN THE PAST 12 MONTHS, THE FOOD YOU BOUGHT JUST DIDN'T LAST AND YOU DIDN'T HAVE MONEY TO GET MORE.: NEVER TRUE

## 2023-05-31 SDOH — ECONOMIC STABILITY: FOOD INSECURITY: WITHIN THE PAST 12 MONTHS, YOU WORRIED THAT YOUR FOOD WOULD RUN OUT BEFORE YOU GOT MONEY TO BUY MORE.: NEVER TRUE

## 2023-05-31 SDOH — ECONOMIC STABILITY: INCOME INSECURITY: HOW HARD IS IT FOR YOU TO PAY FOR THE VERY BASICS LIKE FOOD, HOUSING, MEDICAL CARE, AND HEATING?: NOT HARD AT ALL

## 2023-05-31 ASSESSMENT — PATIENT HEALTH QUESTIONNAIRE - PHQ9
2. FEELING DOWN, DEPRESSED OR HOPELESS: 0
SUM OF ALL RESPONSES TO PHQ QUESTIONS 1-9: 0
SUM OF ALL RESPONSES TO PHQ9 QUESTIONS 1 & 2: 0
1. LITTLE INTEREST OR PLEASURE IN DOING THINGS: 0
SUM OF ALL RESPONSES TO PHQ QUESTIONS 1-9: 0

## 2023-05-31 ASSESSMENT — ENCOUNTER SYMPTOMS
BLOOD IN STOOL: 0
RHINORRHEA: 0
ABDOMINAL PAIN: 0
SHORTNESS OF BREATH: 0
CONSTIPATION: 0
CHEST TIGHTNESS: 0
COUGH: 0

## 2023-05-31 ASSESSMENT — LIFESTYLE VARIABLES
HOW OFTEN DO YOU HAVE A DRINK CONTAINING ALCOHOL: NEVER
HOW MANY STANDARD DRINKS CONTAINING ALCOHOL DO YOU HAVE ON A TYPICAL DAY: PATIENT DOES NOT DRINK

## 2023-05-31 NOTE — PATIENT INSTRUCTIONS
low-dose aspirin. Wait for an ambulance. Do not try to drive yourself. Watch closely for changes in your health, and be sure to contact your doctor if you have any problems. Where can you learn more? Go to http://www.persaud.com/ and enter F075 to learn more about \"A Healthy Heart: Care Instructions. \"  Current as of: September 7, 2022               Content Version: 13.6  © 8627-3594 [employer]. Care instructions adapted under license by [facility] ([facility]). If you have questions about a medical condition or this instruction, always ask your healthcare professional. [place] [address] any warranty or liability for your use of this information. Personalized Preventive Plan for Lorenzo Espinal - 5/31/2023  Medicare offers a range of preventive health benefits. Some of the tests and screenings are paid in full while other may be subject to a deductible, co-insurance, and/or copay. Some of these benefits include a comprehensive review of your medical history including lifestyle, illnesses that may run in your family, and various assessments and screenings as appropriate. After reviewing your medical record and screening and assessments performed today your provider may have ordered immunizations, labs, imaging, and/or referrals for you. A list of these orders (if applicable) as well as your Preventive Care list are included within your After Visit Summary for your review. Other Preventive Recommendations:    A preventive eye exam performed by an eye specialist is recommended every 1-2 years to screen for glaucoma; cataracts, macular degeneration, and other eye disorders. A preventive dental visit is recommended every 6 months. Try to get at least 150 minutes of exercise per week or 10,000 steps per day on a pedometer . Order or download the FREE \"Exercise & Physical Activity: Your Everyday Guide\" from [employer].  Call [phone] or search

## 2023-05-31 NOTE — PROGRESS NOTES
Subjective:      Patient ID: Leila Diaz is a 74 y.o. female. HPI  Follow up on chronic medical problems. Overall feeling well. HTN follow up:  Compliant w/ meds, low salt diet, and exercise. No home bp monitoring. No swelling, headache or dizziness. No chest pain, SOB, palpitations. Otherwise feeling well since the last visit. DM type II follow up:  Compliant w/ meds, diabetic diet, and exercise. Tolerating rybelsus. Obtains home glucose monitoring averaging 100-140. Checks BS daily on most days and prn. Pt does not have BS log at visit today. No Rf needed for today. Denies any tingling sensation, polyuria and polydipsia. No blurred vision. No significant weight changes. Feeling well since last OV. Hypercholesterolemia follow up:  Compliant w/ low fat, low cholesterol diet. Exercising some. Off of Lipitor d/t muscle aching. We discussed restarting with another statin pending her results from today. Osteoarthritis:  Patient has osteoarthritis. Has migratory joint aching and stiffness that comes and goes. She has been taking motrin for the pain. Also add lyrica which has helped some. Pain is 5/10. She has been using ice which she prefers over heat. Symptoms onset: problem is longstanding. Rheumatological ROS: stable, mild-to-moderate joint symptoms intermittently, reasonably well controlled by PRN meds. Response to treatment plan: stable and intermittent.    HM:  Mammogram  11/1/22  Colonoscopy 4/25/2018 by Dr. Barbara Hernandez- repeat in 10 years     Past Medical History:   Diagnosis Date    DJD (degenerative joint disease) 4/10/2010    and neck    DM (diabetes mellitus) ([facility] [address] 4/10/2010    Elevated cholesterol 4/10/2010    HTN (hypertension) 4/10/2010    Hypokalemia 4/10/2010    Unspecified vitamin D deficiency      Past Surgical History:   Procedure Laterality Date    COLONOSCOPY FLX DX W/COLLJ SPEC WHEN PFRMD  03/28/2008    Dr Nitin Godoy ([address] [place] [address])

## 2023-05-31 NOTE — PROGRESS NOTES
Chief Complaint   Patient presents with    Medicare AWV       1. \"Have you been to the ER, urgent care clinic since your last visit? Hospitalized since your last visit? \" No    2. \"Have you seen or consulted any other health care providers outside of the [address] [place] [address] since your last visit? \" No     3. For patients aged 39-70: Has the patient had a colonoscopy / FIT/ Cologuard? Yes      If the patient is female:    4. For patients aged 41-77: Has the patient had a mammogram within the past 2 years? Yes      5. For patients aged 21-65: Has the patient had a pap smear?  No     Health Maintenance Due   Topic Date Due    Shingles vaccine (1 of 2) Never done    DTaP/Tdap/Td vaccine (2 - Td or Tdap) 05/01/2022    Diabetic foot exam  05/31/2022    COVID-19 Vaccine (5 - Booster for Moderna series) 12/14/2022    Diabetic Alb to Cr ratio (uACR) test  06/29/2023        Vitals:    05/31/23 1031   BP: (!) 151/81   Site: Right Upper Arm   Position: Sitting   Cuff Size: Medium Adult   Pulse: 81   Resp: 18   Temp: 98.1 °F (36.7 °C)   TempSrc: Oral   SpO2: 100%   Weight: 197 lb 3.2 oz (89.4 kg)   Height: 4' 11.75\" (1.518 m)

## 2023-05-31 NOTE — PROGRESS NOTES
Medicare Annual Wellness Visit    Silvino Vo is here for Medicare AWV    Assessment & Plan   Welcome to Medicare preventive visit  -     EKG 12 Lead  Essential (primary) hypertension  -     metoprolol succinate (TOPROL XL) 25 MG extended release tablet; Take 1 tablet by mouth daily, Disp-30 tablet, R-3Normal  Type 2 diabetes mellitus with diabetic polyneuropathy, without long-term current use of insulin (HCC)  -     AMB POC GLUCOSE BLOOD, BY GLUCOSE MONITORING DEVICE  -     AMB POC HEMOGLOBIN A1C  -     Semaglutide (RYBELSUS) 7 MG TABS; Take 7 mg by mouth daily, Disp-90 tablet, R-3Normal  -     Microalbumin / Creatinine Urine Ratio; Future  Mixed hyperlipidemia  -     Lipid Panel; Future  Primary osteoarthritis involving multiple joints  Abnormal EKG  -     [facility] - GARRY Urias MD, Cardiology, [address] [place] [address]  Encounter for long-term (current) use of medications  -     CBC; Future  -     Comprehensive Metabolic Panel; Future  -     Urinalysis with Microscopic; Future  Non morbid obesity    Recommendations for Preventive Services Due: see orders and patient instructions/AVS.  Recommended screening schedule for the next 5-10 years is provided to the patient in written form: see Patient Instructions/AVS.     Return in about 1 month (around 6/30/2023). Subjective     Patient's complete Health Risk Assessment and screening values have been reviewed and are found in Flowsheets. The following problems were reviewed today and where indicated follow up appointments were made and/or referrals ordered.     Positive Risk Factor Screenings with Interventions:                 Weight and Activity:  Physical Activity: Insufficiently Active    Days of Exercise per Week: 2 days    Minutes of Exercise per Session: 20 min     On average, how many days per week do you engage in moderate to strenuous exercise (like a brisk walk)?: 2 days  Have you lost any weight without trying in the past 3 months?: No  Body mass index is 38.84

## 2023-06-01 LAB
ALBUMIN SERPL-MCNC: 4.4 G/DL (ref 3.5–5)
ALBUMIN/GLOB SERPL: 1.3 (ref 1.1–2.2)
ALP SERPL-CCNC: 94 U/L (ref 45–117)
ALT SERPL-CCNC: 25 U/L (ref 12–78)
ANION GAP SERPL CALC-SCNC: 8 MMOL/L (ref 5–15)
APPEARANCE UR: CLEAR
AST SERPL-CCNC: 18 U/L (ref 15–37)
BACTERIA URNS QL MICRO: NEGATIVE /HPF
BILIRUB SERPL-MCNC: 0.9 MG/DL (ref 0.2–1)
BILIRUB UR QL: NEGATIVE
BUN SERPL-MCNC: 11 MG/DL (ref 6–20)
BUN/CREAT SERPL: 14 (ref 12–20)
CALCIUM SERPL-MCNC: 10.1 MG/DL (ref 8.5–10.1)
CHLORIDE SERPL-SCNC: 102 MMOL/L (ref 97–108)
CHOLEST SERPL-MCNC: 212 MG/DL
CO2 SERPL-SCNC: 30 MMOL/L (ref 21–32)
COLOR UR: ABNORMAL
CREAT SERPL-MCNC: 0.76 MG/DL (ref 0.55–1.02)
CREAT UR-MCNC: 53.2 MG/DL
EPITH CASTS URNS QL MICRO: ABNORMAL /LPF
ERYTHROCYTE [DISTWIDTH] IN BLOOD BY AUTOMATED COUNT: 14.3 % (ref 11.5–14.5)
GLOBULIN SER CALC-MCNC: 3.5 G/DL (ref 2–4)
GLUCOSE SERPL-MCNC: 104 MG/DL (ref 65–100)
GLUCOSE UR STRIP.AUTO-MCNC: NEGATIVE MG/DL
HCT VFR BLD AUTO: 43.4 % (ref 35–47)
HDLC SERPL-MCNC: 56 MG/DL
HDLC SERPL: 3.8 (ref 0–5)
HGB BLD-MCNC: 13.6 G/DL (ref 11.5–16)
HGB UR QL STRIP: NEGATIVE
KETONES UR QL STRIP.AUTO: ABNORMAL MG/DL
LDLC SERPL CALC-MCNC: 137.8 MG/DL (ref 0–100)
LEUKOCYTE ESTERASE UR QL STRIP.AUTO: ABNORMAL
MCH RBC QN AUTO: 27.2 PG (ref 26–34)
MCHC RBC AUTO-ENTMCNC: 31.3 G/DL (ref 30–36.5)
MCV RBC AUTO: 86.8 FL (ref 80–99)
MICROALBUMIN UR-MCNC: 0.93 MG/DL
MICROALBUMIN/CREAT UR-RTO: 17 MG/G (ref 0–30)
NITRITE UR QL STRIP.AUTO: NEGATIVE
NRBC # BLD: 0 K/UL (ref 0–0.01)
NRBC BLD-RTO: 0 PER 100 WBC
PH UR STRIP: 5.5 (ref 5–8)
PLATELET # BLD AUTO: 270 K/UL (ref 150–400)
PMV BLD AUTO: 11.1 FL (ref 8.9–12.9)
POTASSIUM SERPL-SCNC: 4.2 MMOL/L (ref 3.5–5.1)
PROT SERPL-MCNC: 7.9 G/DL (ref 6.4–8.2)
PROT UR STRIP-MCNC: NEGATIVE MG/DL
RBC # BLD AUTO: 5 M/UL (ref 3.8–5.2)
RBC #/AREA URNS HPF: ABNORMAL /HPF (ref 0–5)
SODIUM SERPL-SCNC: 140 MMOL/L (ref 136–145)
SP GR UR REFRACTOMETRY: 1.01 (ref 1–1.03)
TRIGL SERPL-MCNC: 91 MG/DL
UROBILINOGEN UR QL STRIP.AUTO: 0.2 EU/DL (ref 0.2–1)
VLDLC SERPL CALC-MCNC: 18.2 MG/DL
WBC # BLD AUTO: 6.7 K/UL (ref 3.6–11)
WBC URNS QL MICRO: ABNORMAL /HPF (ref 0–4)

## 2023-06-05 RX ORDER — ROSUVASTATIN CALCIUM 40 MG/1
40 TABLET, COATED ORAL
Qty: 90 TABLET | Refills: 1 | Status: SHIPPED | OUTPATIENT
Start: 2023-06-05

## 2023-06-19 RX ORDER — ROSUVASTATIN CALCIUM 40 MG/1
40 TABLET, COATED ORAL
Qty: 90 TABLET | Refills: 1 | Status: SHIPPED | OUTPATIENT
Start: 2023-06-19

## 2023-06-19 NOTE — TELEPHONE ENCOUNTER
Patient states that [facility] does not have the Crestor. She would like a new Rx to be sent to [facility]. Requested Prescriptions     Pending Prescriptions Disp Refills    rosuvastatin (CRESTOR) 40 MG tablet 90 tablet 1     Sig: Take 1 tablet by mouth nightly           For Pharmacy Admin Tracking Only    Program: Medication Refill  CPA in place:    Recommendation Provided To:    Intervention Detail: New Rx: 1, reason: Patient Preference  Intervention Accepted By:   Dorann Cranker Closed?:    Time Spent (min): 5

## 2023-08-01 ENCOUNTER — OFFICE VISIT (OUTPATIENT)
Age: 68
End: 2023-08-01
Payer: MEDICARE

## 2023-08-01 VITALS
HEIGHT: 59 IN | TEMPERATURE: 98.2 F | BODY MASS INDEX: 38.59 KG/M2 | WEIGHT: 191.4 LBS | SYSTOLIC BLOOD PRESSURE: 132 MMHG | RESPIRATION RATE: 20 BRPM | OXYGEN SATURATION: 99 % | HEART RATE: 81 BPM | DIASTOLIC BLOOD PRESSURE: 80 MMHG

## 2023-08-01 DIAGNOSIS — E11.42 TYPE 2 DIABETES MELLITUS WITH DIABETIC POLYNEUROPATHY, WITHOUT LONG-TERM CURRENT USE OF INSULIN (HCC): ICD-10-CM

## 2023-08-01 DIAGNOSIS — E78.2 MIXED HYPERLIPIDEMIA: ICD-10-CM

## 2023-08-01 DIAGNOSIS — M15.9 PRIMARY OSTEOARTHRITIS INVOLVING MULTIPLE JOINTS: ICD-10-CM

## 2023-08-01 DIAGNOSIS — E66.9 NON MORBID OBESITY: ICD-10-CM

## 2023-08-01 DIAGNOSIS — I10 ESSENTIAL (PRIMARY) HYPERTENSION: Primary | ICD-10-CM

## 2023-08-01 DIAGNOSIS — Z12.31 ENCOUNTER FOR SCREENING MAMMOGRAM FOR BREAST CANCER: ICD-10-CM

## 2023-08-01 DIAGNOSIS — M51.16 LUMBAR DISC DISEASE WITH RADICULOPATHY: ICD-10-CM

## 2023-08-01 DIAGNOSIS — Z79.899 ENCOUNTER FOR LONG-TERM (CURRENT) USE OF MEDICATIONS: ICD-10-CM

## 2023-08-01 PROCEDURE — G8417 CALC BMI ABV UP PARAM F/U: HCPCS | Performed by: FAMILY MEDICINE

## 2023-08-01 PROCEDURE — G8427 DOCREV CUR MEDS BY ELIG CLIN: HCPCS | Performed by: FAMILY MEDICINE

## 2023-08-01 PROCEDURE — G8399 PT W/DXA RESULTS DOCUMENT: HCPCS | Performed by: FAMILY MEDICINE

## 2023-08-01 PROCEDURE — 99214 OFFICE O/P EST MOD 30 MIN: CPT | Performed by: FAMILY MEDICINE

## 2023-08-01 PROCEDURE — 1036F TOBACCO NON-USER: CPT | Performed by: FAMILY MEDICINE

## 2023-08-01 PROCEDURE — 3078F DIAST BP <80 MM HG: CPT | Performed by: FAMILY MEDICINE

## 2023-08-01 PROCEDURE — 3046F HEMOGLOBIN A1C LEVEL >9.0%: CPT | Performed by: FAMILY MEDICINE

## 2023-08-01 PROCEDURE — 1123F ACP DISCUSS/DSCN MKR DOCD: CPT | Performed by: FAMILY MEDICINE

## 2023-08-01 PROCEDURE — 1090F PRES/ABSN URINE INCON ASSESS: CPT | Performed by: FAMILY MEDICINE

## 2023-08-01 PROCEDURE — 2022F DILAT RTA XM EVC RTNOPTHY: CPT | Performed by: FAMILY MEDICINE

## 2023-08-01 PROCEDURE — 3017F COLORECTAL CA SCREEN DOC REV: CPT | Performed by: FAMILY MEDICINE

## 2023-08-01 PROCEDURE — 3075F SYST BP GE 130 - 139MM HG: CPT | Performed by: FAMILY MEDICINE

## 2023-08-01 RX ORDER — PREGABALIN 50 MG/1
50 CAPSULE ORAL 3 TIMES DAILY
Qty: 90 CAPSULE | Refills: 5 | Status: SHIPPED | OUTPATIENT
Start: 2023-08-01 | End: 2023-11-08

## 2023-08-01 SDOH — ECONOMIC STABILITY: INCOME INSECURITY: HOW HARD IS IT FOR YOU TO PAY FOR THE VERY BASICS LIKE FOOD, HOUSING, MEDICAL CARE, AND HEATING?: NOT HARD AT ALL

## 2023-08-01 SDOH — ECONOMIC STABILITY: FOOD INSECURITY: WITHIN THE PAST 12 MONTHS, THE FOOD YOU BOUGHT JUST DIDN'T LAST AND YOU DIDN'T HAVE MONEY TO GET MORE.: NEVER TRUE

## 2023-08-01 SDOH — ECONOMIC STABILITY: FOOD INSECURITY: WITHIN THE PAST 12 MONTHS, YOU WORRIED THAT YOUR FOOD WOULD RUN OUT BEFORE YOU GOT MONEY TO BUY MORE.: NEVER TRUE

## 2023-08-01 ASSESSMENT — ENCOUNTER SYMPTOMS
BLOOD IN STOOL: 0
CHEST TIGHTNESS: 0
RHINORRHEA: 0
SHORTNESS OF BREATH: 0
CONSTIPATION: 0
ABDOMINAL PAIN: 0
COUGH: 0

## 2023-08-01 ASSESSMENT — PATIENT HEALTH QUESTIONNAIRE - PHQ9
SUM OF ALL RESPONSES TO PHQ QUESTIONS 1-9: 0
SUM OF ALL RESPONSES TO PHQ QUESTIONS 1-9: 0
2. FEELING DOWN, DEPRESSED OR HOPELESS: 0
SUM OF ALL RESPONSES TO PHQ QUESTIONS 1-9: 0
SUM OF ALL RESPONSES TO PHQ9 QUESTIONS 1 & 2: 0
1. LITTLE INTEREST OR PLEASURE IN DOING THINGS: 0
SUM OF ALL RESPONSES TO PHQ QUESTIONS 1-9: 0

## 2023-08-04 ENCOUNTER — TELEPHONE (OUTPATIENT)
Age: 68
End: 2023-08-04

## 2023-08-04 NOTE — TELEPHONE ENCOUNTER
----- Message from Saniya Weiss MD sent at 8/1/2023  3:14 PM EDT -----  Please send for her records from cardiology and echo.

## 2023-09-20 NOTE — MR AVS SNAPSHOT
Visit Information Date & Time Provider Department Dept. Phone Encounter #  
 3/13/2017  8:30 AM Wm Pride [phone] [id number] Follow-up Instructions Return in about 4 months (around 7/13/2017). Your Appointments 7/14/2017 10:15 AM  
ACUTE CARE with Denise Zamora MD  
[facility] Appt Note: well woman cp 0 sb 7/12/16  
 [address] [place] [address] [id number] [address] [place] [address] [address] [place] [address] Upcoming Health Maintenance Date Due MICROALBUMIN Q1 3/2/2017 HEMOGLOBIN A1C Q6M 5/11/2017 FOOT EXAM Q1 7/13/2017 EYE EXAM RETINAL OR DILATED Q1 9/30/2017 PAP AKA CERVICAL CYTOLOGY 10/31/2017 LIPID PANEL Q1 11/11/2017 COLONOSCOPY 3/28/2018 BREAST CANCER SCRN MAMMOGRAM 10/12/2018 DTaP/Tdap/Td series (2 - Td) 5/1/2022 Allergies as of 3/13/2017  Review Complete On: 3/13/2017 By: Isaac Sparks LPN Severity Noted Reaction Type Reactions Codeine  04/10/2010    Nausea and Vomiting Keflex [Cephalexin]  04/10/2010    Nausea and Vomiting Lipitor [Atorvastatin]  02/20/2015    Myalgia Percocet [Oxycodone-acetaminophen]  04/10/2010    Nausea and Vomiting Tramadol  04/22/2013    Other (comments)  
 dizziness Vicodin [Hydrocodone-acetaminophen]  04/22/2013    Nausea and Vomiting Current Immunizations  Reviewed on 10/11/2016 Name Date Influenza Vaccine 10/11/2016, 10/12/2015 Pneumococcal Polysaccharide (PPSV-23)  Deferred (Patient Refused) Tdap 5/1/2012 Not reviewed this visit You Were Diagnosed With   
  
 Codes Comments Essential hypertension    -  Primary ICD-10-CM: I10 
ICD-9-CM: 401.9 Type 2 diabetes mellitus without complication, unspecified long term insulin use status (HCC)     ICD-10-CM: E11.9 ICD-9-CM: 250.00 Mixed hyperlipidemia     ICD-10-CM: E78.2 ICD-9-CM: 272.2 Primary osteoarthritis involving multiple joints     ICD-10-CM: M15.0 ICD-9-CM: 715.09 Encounter for medication monitoring     ICD-10-CM: Z51.81 
ICD-9-CM: V58.83 Upper respiratory tract infection, unspecified type     ICD-10-CM: J06.9 ICD-9-CM: 465.9 Influenza     ICD-10-CM: J11.1 ICD-9-CM: 487. 1 Vitals BP Pulse Temp Resp Height(growth percentile) Weight(growth percentile) 100/60 (BP 1 Location: Left arm, BP Patient Position: Sitting) 68 (!) 101.2 °F (38.4 °C) (Oral) 16 5' 2\" (1.575 m) 212 lb 3.2 oz (96.3 kg) LMP SpO2 PF BMI OB Status Smoking Status 06/14/2004 96% 300 L/min 38.81 kg/m2 Hysterectomy Never Smoker Vitals History BMI and BSA Data Body Mass Index Body Surface Area  
 38.81 kg/m 2 2.05 m 2 Preferred Pharmacy Pharmacy Name Phone [place] [address] [address] [facility] [address] [facility] [phone] Your Updated Medication List  
  
   
This list is accurate as of: 3/13/17 10:21 AM.  Always use your most recent med list.  
  
  
  
  
 acetaminophen 325 mg tablet Commonly known as:  TYLENOL Take 2 Tabs by mouth every four (4) hours as needed for Pain. ALEVE 220 mg Cap Generic drug:  naproxen sodium Take 1 Tab by mouth two (2) times daily as needed. ALPRAZolam 0.5 mg tablet Commonly known as:  XANAX  
TAKE 1 TABLET BY MOUTH ONCE DAILY EVERY NIGHT AT BEDTIME AS NEEDED FOR SLEEP  
  
 aspirin 81 mg tablet Take 81 mg by mouth daily. cyclobenzaprine 5 mg tablet Commonly known as:  FLEXERIL Take 1 tab 3 times daily as needed  
  
 glipiZIDE SR 5 mg CR tablet Commonly known as:  GLUCOTROL XL  
TAKE 1 TABLET BY MOUTH ONCE DAILY  
  
 glucose blood VI test strips strip Commonly known as:  [address] Use to check blood sugar daily and prn. Lancets Misc Commonly known as:  ACCU-CHEK FASTCLIX Use to obtain blood sample for glucose testing daily and prn.  
  
 metFORMIN  mg tablet Commonly known as:  GLUCOPHAGE XR Take 2 Tabs by mouth two (2) times daily (with meals). oseltamivir 75 mg capsule Commonly known as:  TAMIFLU Take 1 Cap by mouth two (2) times a day for 5 days. promethazine-dextromethorphan 6.25-15 mg/5 mL syrup Commonly known as:  PROMETHAZINE-DM Take 5 mL by mouth every six (6) hours as needed for Cough. valsartan-hydroCHLOROthiazide 320-25 mg per tablet Commonly known as:  DIOVAN-HCT Take 1 Tab by mouth daily. Prescriptions Sent to Pharmacy Refills  
 oseltamivir (TAMIFLU) 75 mg capsule 0 Sig: Take 1 Cap by mouth two (2) times a day for 5 days. Class: Normal  
 Pharmacy: [facility] , [address] [place] [place] [address] AT [address] [place] [address] Ph #: [phone] Route: Oral  
 promethazine-dextromethorphan (PROMETHAZINE-DM) 6.25-15 mg/5 mL syrup 1 Sig: Take 5 mL by mouth every six (6) hours as needed for Cough. Class: Normal  
 Pharmacy: [facility] , [address] [place] [address] AT [address] [place] [address] Ph #: [phone] Route: Oral  
  
We Performed the Following AMB POC COMPLETE CBC, AUTOMATED [60813 CPT(R)] AMB POC GLUCOSE, QUANTITATIVE, BLOOD [42860 CPT(R)] AMB POC HEMOGLOBIN A1C [32000 CPT(R)] AMB POC RAPID STREP A [96309 CPT(R)] AMB POC RENE INFLUENZA A/B TEST [16783 CPT(R)] AMB POC URINALYSIS DIP STICK AUTO W/O MICRO [87497 CPT(R)] LIPID PANEL [21391 CPT(R)] METABOLIC PANEL, BASIC [01464 CPT(R)] MICROALBUMIN, UR, RAND W/ MICROALBUMIN/CREA RATIO [id number] CPT(R)] Follow-up Instructions Return in about 4 months (around 7/13/2017). To-Do List   
 03/13/2017 Imaging:  XR CHEST PA LAT   
  
 04/18/2017 9:00 AM  
  Appointment with DIABETES CLASS #[id number] at [facility] ([phone]) Introducing [facility]! [facility] introduces [employer] patient portal. Now you can access parts of your medical record, email your doctor's office, and request medication refills online. 1. In your internet browser, go to [email or web address] 2. Click on the First Time User? Click Here link in the Sign In box. You will see the New Member Sign Up page. 3. Enter your [employer] Access Code exactly as it appears below. You will not need to use this code after youve completed the sign-up process. If you do not sign up before the expiration date, you must request a new code. · [employer] Access Code: [email or web address] Expires: 6/11/2017 10:21 AM 
 
4. Enter the last four digits of your Social Security Number (xxxx) and Date of Birth (mm/dd/yyyy) as indicated and click Submit. You will be taken to the next sign-up page. 5. Create a [employer] ID. This will be your [employer] login ID and cannot be changed, so think of one that is secure and easy to remember. 6. Create a [employer] password. You can change your password at any time. 7. Enter your Password Reset Question and Answer. This can be used at a later time if you forget your password. 8. Enter your e-mail address. You will receive e-mail notification when new information is available in [address] 9. Click Sign Up. You can now view and download portions of your medical record. 10. Click the Download Summary menu link to download a portable copy of your medical information. If you have questions, please visit the Frequently Asked Questions section of the [employer] website. Remember, [employer] is NOT to be used for urgent needs. For medical emergencies, dial 911. Now available from your iPhone and Android! Please provide this summary of care documentation to your next provider. Your primary care clinician is listed as Margaret Jackson. If you have any questions after today's visit, please call [phone]. The patient is a 39y Female complaining of

## 2023-10-23 RX ORDER — VALSARTAN AND HYDROCHLOROTHIAZIDE 320; 25 MG/1; MG/1
1 TABLET, FILM COATED ORAL DAILY
Qty: 90 TABLET | Refills: 3 | Status: SHIPPED | OUTPATIENT
Start: 2023-10-23

## 2023-10-25 RX ORDER — VALSARTAN AND HYDROCHLOROTHIAZIDE 320; 25 MG/1; MG/1
1 TABLET, FILM COATED ORAL DAILY
Qty: 30 TABLET | OUTPATIENT
Start: 2023-10-25

## 2023-10-25 NOTE — TELEPHONE ENCOUNTER
Duplicate      For Pharmacy Admin Tracking Only    Program: Medication Refill  CPA in place:    Recommendation Provided To:    Intervention Detail: Discontinued Rx: 1, reason: Duplicate Therapy  Intervention Accepted By:   Marie Bars Closed?:    Time Spent (min): 5

## 2023-11-02 ENCOUNTER — HOSPITAL ENCOUNTER (OUTPATIENT)
Facility: HOSPITAL | Age: 68
Discharge: HOME | End: 2023-11-02
Attending: FAMILY MEDICINE
Payer: MEDICARE

## 2023-11-02 VITALS — WEIGHT: 192 LBS | HEIGHT: 62 IN | BODY MASS INDEX: 35.33 KG/M2

## 2023-11-02 DIAGNOSIS — Z12.31 ENCOUNTER FOR SCREENING MAMMOGRAM FOR BREAST CANCER: ICD-10-CM

## 2023-11-02 PROCEDURE — 77063 BREAST TOMOSYNTHESIS BI: CPT

## 2023-11-18 DIAGNOSIS — I10 ESSENTIAL (PRIMARY) HYPERTENSION: ICD-10-CM

## 2023-11-20 NOTE — TELEPHONE ENCOUNTER
I show Toprol was d/c on 8/1/23 citing \"therapy complete\". Please sign if appropriate. Last appointment: 8/1/23  Next appointment: 12/1/23  Previous refill encounter(s): 5/31/23 #30 with 3 refills    Requested Prescriptions     Pending Prescriptions Disp Refills    metoprolol succinate (TOPROL XL) 25 MG extended release tablet [Pharmacy Med Name: METOPROLOL ER SUCCINATE 25MG TABS] 30 tablet 3     Sig: TAKE 1 TABLET BY MOUTH DAILY         For Pharmacy Admin Tracking Only    Program: Medication Refill  CPA in place:    Recommendation Provided To:    Intervention Detail: New Rx: 1, reason: Patient Preference  Intervention Accepted By:   Rosalia Harris Closed?:    Time Spent (min): 5

## 2023-11-21 RX ORDER — METOPROLOL SUCCINATE 25 MG/1
25 TABLET, EXTENDED RELEASE ORAL DAILY
Qty: 30 TABLET | Refills: 3 | Status: SHIPPED | OUTPATIENT
Start: 2023-11-21

## 2023-11-29 ENCOUNTER — TELEPHONE (OUTPATIENT)
Age: 68
End: 2023-11-29

## 2023-12-01 ENCOUNTER — OFFICE VISIT (OUTPATIENT)
Age: 68
End: 2023-12-01
Payer: MEDICARE

## 2023-12-01 VITALS
RESPIRATION RATE: 18 BRPM | TEMPERATURE: 97.7 F | SYSTOLIC BLOOD PRESSURE: 133 MMHG | WEIGHT: 186.6 LBS | OXYGEN SATURATION: 99 % | HEART RATE: 72 BPM | DIASTOLIC BLOOD PRESSURE: 73 MMHG | BODY MASS INDEX: 34.34 KG/M2 | HEIGHT: 62 IN

## 2023-12-01 DIAGNOSIS — M15.9 PRIMARY OSTEOARTHRITIS INVOLVING MULTIPLE JOINTS: ICD-10-CM

## 2023-12-01 DIAGNOSIS — M51.16 LUMBAR DISC DISEASE WITH RADICULOPATHY: ICD-10-CM

## 2023-12-01 DIAGNOSIS — E66.9 NON MORBID OBESITY: ICD-10-CM

## 2023-12-01 DIAGNOSIS — E11.42 TYPE 2 DIABETES MELLITUS WITH DIABETIC POLYNEUROPATHY, WITHOUT LONG-TERM CURRENT USE OF INSULIN (HCC): ICD-10-CM

## 2023-12-01 DIAGNOSIS — I10 ESSENTIAL (PRIMARY) HYPERTENSION: Primary | ICD-10-CM

## 2023-12-01 DIAGNOSIS — Z79.899 ENCOUNTER FOR LONG-TERM (CURRENT) USE OF MEDICATIONS: ICD-10-CM

## 2023-12-01 DIAGNOSIS — E78.2 MIXED HYPERLIPIDEMIA: ICD-10-CM

## 2023-12-01 LAB
ALBUMIN SERPL-MCNC: 4.4 G/DL (ref 3.5–5)
ALBUMIN/GLOB SERPL: 1.3 (ref 1.1–2.2)
ALP SERPL-CCNC: 96 U/L (ref 45–117)
ALT SERPL-CCNC: 23 U/L (ref 12–78)
ANION GAP SERPL CALC-SCNC: 5 MMOL/L (ref 5–15)
AST SERPL-CCNC: 19 U/L (ref 15–37)
BILIRUB SERPL-MCNC: 1 MG/DL (ref 0.2–1)
BUN SERPL-MCNC: 27 MG/DL (ref 6–20)
BUN/CREAT SERPL: 35 (ref 12–20)
CALCIUM SERPL-MCNC: 9.9 MG/DL (ref 8.5–10.1)
CHLORIDE SERPL-SCNC: 103 MMOL/L (ref 97–108)
CHOLEST SERPL-MCNC: 137 MG/DL
CO2 SERPL-SCNC: 33 MMOL/L (ref 21–32)
CREAT SERPL-MCNC: 0.78 MG/DL (ref 0.55–1.02)
GLOBULIN SER CALC-MCNC: 3.5 G/DL (ref 2–4)
GLUCOSE SERPL-MCNC: 127 MG/DL (ref 65–100)
GLUCOSE, POC: 131 MG/DL
HBA1C MFR BLD: 6.4 %
HDLC SERPL-MCNC: 54 MG/DL
HDLC SERPL: 2.5 (ref 0–5)
LDLC SERPL CALC-MCNC: 71.8 MG/DL (ref 0–100)
POTASSIUM SERPL-SCNC: 3.6 MMOL/L (ref 3.5–5.1)
PROT SERPL-MCNC: 7.9 G/DL (ref 6.4–8.2)
SODIUM SERPL-SCNC: 141 MMOL/L (ref 136–145)
TRIGL SERPL-MCNC: 56 MG/DL
VLDLC SERPL CALC-MCNC: 11.2 MG/DL

## 2023-12-01 PROCEDURE — 99214 OFFICE O/P EST MOD 30 MIN: CPT | Performed by: FAMILY MEDICINE

## 2023-12-01 PROCEDURE — 90677 PCV20 VACCINE IM: CPT | Performed by: FAMILY MEDICINE

## 2023-12-01 PROCEDURE — 82962 GLUCOSE BLOOD TEST: CPT | Performed by: FAMILY MEDICINE

## 2023-12-01 PROCEDURE — 83036 HEMOGLOBIN GLYCOSYLATED A1C: CPT | Performed by: FAMILY MEDICINE

## 2023-12-01 SDOH — ECONOMIC STABILITY: FOOD INSECURITY: WITHIN THE PAST 12 MONTHS, THE FOOD YOU BOUGHT JUST DIDN'T LAST AND YOU DIDN'T HAVE MONEY TO GET MORE.: NEVER TRUE

## 2023-12-01 SDOH — ECONOMIC STABILITY: FOOD INSECURITY: WITHIN THE PAST 12 MONTHS, YOU WORRIED THAT YOUR FOOD WOULD RUN OUT BEFORE YOU GOT MONEY TO BUY MORE.: NEVER TRUE

## 2023-12-01 SDOH — ECONOMIC STABILITY: INCOME INSECURITY: HOW HARD IS IT FOR YOU TO PAY FOR THE VERY BASICS LIKE FOOD, HOUSING, MEDICAL CARE, AND HEATING?: NOT HARD AT ALL

## 2023-12-01 ASSESSMENT — PATIENT HEALTH QUESTIONNAIRE - PHQ9
1. LITTLE INTEREST OR PLEASURE IN DOING THINGS: 0
SUM OF ALL RESPONSES TO PHQ QUESTIONS 1-9: 0
SUM OF ALL RESPONSES TO PHQ9 QUESTIONS 1 & 2: 0
SUM OF ALL RESPONSES TO PHQ QUESTIONS 1-9: 0
2. FEELING DOWN, DEPRESSED OR HOPELESS: 0

## 2023-12-01 ASSESSMENT — ENCOUNTER SYMPTOMS
ABDOMINAL PAIN: 0
BLOOD IN STOOL: 0
CONSTIPATION: 0
RHINORRHEA: 0
CHEST TIGHTNESS: 0
COUGH: 0
SHORTNESS OF BREATH: 0

## 2023-12-01 NOTE — PROGRESS NOTES
Subjective:      Patient ID: Ashwini Evans is a 79 y.o. female. HPI  Follow up on chronic medical problems. Overall feeling well. HTN follow up:  Compliant w/ meds, low salt diet, and exercise. No home bp monitoring. No swelling, headache or dizziness. No chest pain, SOB, palpitations. Otherwise feeling well since the last visit. Had negative cardiac eval for abnormal EKG and chest pain June 2023 as per cardiology. DM type II follow up:  Compliant w/ meds, diabetic diet, and exercise. Tolerating rybelsus. Has helped with weight loss and with her appetite. Obtains home glucose monitoring averaging 100-140. Checks BS daily on most days and prn.  no low BS. Pt does not have BS log at visit today. No Rf needed for today. Denies any tingling sensation, polyuria and polydipsia. No blurred vision. No significant weight changes. Feeling well since last OV. Hypercholesterolemia follow up:  Compliant w/ low fat, low cholesterol diet. Exercising some. Tolerating crestor. Osteoarthritis:  Patient has osteoarthritis. Has migratory joint aching and stiffness that comes and goes. She has been taking motrin for the pain. Also add lyrica which has helped some. Pain is 5/10. She has been using ice which she prefers over heat. Has had ALAN but is not wanting to get anymore injections at this time. Symptoms onset: problem is longstanding. Rheumatological ROS: stable, mild-to-moderate joint symptoms intermittently, reasonably well controlled by PRN meds. Response to treatment plan: stable and intermittent.    HM:  Mammogram  11/2/23  Colonoscopy 4/25/2018 by Dr. Jordi Zurita- repeat in 10 years     Past Medical History:   Diagnosis Date    DJD (degenerative joint disease) 4/10/2010    and neck    DM (diabetes mellitus) ([address]) 4/10/2010    Elevated cholesterol 4/10/2010    HTN (hypertension) 4/10/2010    Hypokalemia 4/10/2010    Unspecified vitamin D deficiency      Past Surgical History:

## 2023-12-04 ENCOUNTER — TELEPHONE (OUTPATIENT)
Age: 68
End: 2023-12-04

## 2023-12-04 NOTE — TELEPHONE ENCOUNTER
----- Message from Saul Parisi MD sent at 12/1/2023  9:06 AM EST -----  Please send for her eye exam. Dr. Jaida Baker.

## 2023-12-24 NOTE — ED NOTES
Placed post op shoe on patient. Discharge instructions given by dr. Shayne Grant.  Patient ambulated from exam. Stay hydrated.

## 2024-02-13 RX ORDER — ROSUVASTATIN CALCIUM 40 MG/1
40 TABLET, COATED ORAL NIGHTLY
Qty: 90 TABLET | Refills: 3 | Status: SHIPPED | OUTPATIENT
Start: 2024-02-13

## 2024-02-13 NOTE — TELEPHONE ENCOUNTER
Last appointment: 12/1/23  Next appointment: 6/3/24  Previous refill encounter(s): 6/19/23 #90 with 1 refill    Requested Prescriptions     Pending Prescriptions Disp Refills    rosuvastatin (CRESTOR) 40 MG tablet [Pharmacy Med Name: ROSUVASTATIN TABS 40MG] 90 tablet 3     Sig: TAKE 1 TABLET NIGHTLY         For Pharmacy Admin Tracking Only    Program: Medication Refill  CPA in place:    Recommendation Provided To:   Intervention Detail: New Rx: 1, reason: Patient Preference  Intervention Accepted By:   Gap Closed?:    Time Spent (min): 5

## 2024-05-22 ENCOUNTER — TELEPHONE (OUTPATIENT)
Age: 69
End: 2024-05-22

## 2024-05-22 NOTE — TELEPHONE ENCOUNTER
[facility] [phone] Fax#[phone] Patient has an appointment with Dr. Bandar Garvin 5/29/24 for abnormal EKG.  requesting recent office notes

## 2024-05-22 NOTE — TELEPHONE ENCOUNTER
Pt most recent office notes faxed today to [facility] attention to Catrina at [phone].

## 2024-06-03 ENCOUNTER — OFFICE VISIT (OUTPATIENT)
Age: 69
End: 2024-06-03
Payer: MEDICARE

## 2024-06-03 VITALS
HEART RATE: 68 BPM | OXYGEN SATURATION: 99 % | TEMPERATURE: 98.2 F | SYSTOLIC BLOOD PRESSURE: 134 MMHG | DIASTOLIC BLOOD PRESSURE: 76 MMHG | HEIGHT: 62 IN | RESPIRATION RATE: 18 BRPM | WEIGHT: 197.4 LBS | BODY MASS INDEX: 36.33 KG/M2

## 2024-06-03 DIAGNOSIS — E66.9 NON MORBID OBESITY: ICD-10-CM

## 2024-06-03 DIAGNOSIS — M15.9 PRIMARY OSTEOARTHRITIS INVOLVING MULTIPLE JOINTS: ICD-10-CM

## 2024-06-03 DIAGNOSIS — E78.2 MIXED HYPERLIPIDEMIA: ICD-10-CM

## 2024-06-03 DIAGNOSIS — M54.2 CERVICALGIA: ICD-10-CM

## 2024-06-03 DIAGNOSIS — Z00.00 MEDICARE ANNUAL WELLNESS VISIT, SUBSEQUENT: Primary | ICD-10-CM

## 2024-06-03 DIAGNOSIS — I10 ESSENTIAL (PRIMARY) HYPERTENSION: ICD-10-CM

## 2024-06-03 DIAGNOSIS — M50.30 DEGENERATIVE DISC DISEASE, CERVICAL: ICD-10-CM

## 2024-06-03 DIAGNOSIS — E11.42 TYPE 2 DIABETES MELLITUS WITH DIABETIC POLYNEUROPATHY, WITHOUT LONG-TERM CURRENT USE OF INSULIN (HCC): ICD-10-CM

## 2024-06-03 DIAGNOSIS — Z79.899 ENCOUNTER FOR LONG-TERM (CURRENT) USE OF MEDICATIONS: ICD-10-CM

## 2024-06-03 LAB
ALBUMIN SERPL-MCNC: 3.6 G/DL (ref 3.5–5)
ALBUMIN/GLOB SERPL: 1.2 (ref 1.1–2.2)
ALP SERPL-CCNC: 89 U/L (ref 45–117)
ALT SERPL-CCNC: 24 U/L (ref 12–78)
ANION GAP SERPL CALC-SCNC: 5 MMOL/L (ref 5–15)
APPEARANCE UR: CLEAR
AST SERPL-CCNC: 22 U/L (ref 15–37)
BACTERIA URNS QL MICRO: ABNORMAL /HPF
BILIRUB SERPL-MCNC: 1.1 MG/DL (ref 0.2–1)
BILIRUB UR QL: NEGATIVE
BUN SERPL-MCNC: 11 MG/DL (ref 6–20)
BUN/CREAT SERPL: 17 (ref 12–20)
CALCIUM SERPL-MCNC: 9.3 MG/DL (ref 8.5–10.1)
CHLORIDE SERPL-SCNC: 104 MMOL/L (ref 97–108)
CHOLEST SERPL-MCNC: 119 MG/DL
CO2 SERPL-SCNC: 29 MMOL/L (ref 21–32)
COLOR UR: ABNORMAL
CREAT SERPL-MCNC: 0.63 MG/DL (ref 0.55–1.02)
CREAT UR-MCNC: 65.7 MG/DL
EPITH CASTS URNS QL MICRO: ABNORMAL /LPF
ERYTHROCYTE [DISTWIDTH] IN BLOOD BY AUTOMATED COUNT: 13.5 % (ref 11.5–14.5)
GLOBULIN SER CALC-MCNC: 3.1 G/DL (ref 2–4)
GLUCOSE SERPL-MCNC: 113 MG/DL (ref 65–100)
GLUCOSE UR STRIP.AUTO-MCNC: NEGATIVE MG/DL
GLUCOSE, POC: 131 MG/DL
HBA1C MFR BLD: 6.4 %
HCT VFR BLD AUTO: 37.2 % (ref 35–47)
HDLC SERPL-MCNC: 54 MG/DL
HDLC SERPL: 2.2 (ref 0–5)
HGB BLD-MCNC: 12.8 G/DL (ref 11.5–16)
HGB UR QL STRIP: NEGATIVE
KETONES UR QL STRIP.AUTO: NEGATIVE MG/DL
LDLC SERPL CALC-MCNC: 55.2 MG/DL (ref 0–100)
LEUKOCYTE ESTERASE UR QL STRIP.AUTO: ABNORMAL
MCH RBC QN AUTO: 28.3 PG (ref 26–34)
MCHC RBC AUTO-ENTMCNC: 34.4 G/DL (ref 30–36.5)
MCV RBC AUTO: 82.3 FL (ref 80–99)
MICROALBUMIN UR-MCNC: 0.83 MG/DL
MICROALBUMIN/CREAT UR-RTO: 13 MG/G (ref 0–30)
NITRITE UR QL STRIP.AUTO: NEGATIVE
NRBC # BLD: 0 K/UL (ref 0–0.01)
NRBC BLD-RTO: 0 PER 100 WBC
PH UR STRIP: 5.5 (ref 5–8)
PLATELET # BLD AUTO: 182 K/UL (ref 150–400)
PMV BLD AUTO: 11 FL (ref 8.9–12.9)
POTASSIUM SERPL-SCNC: 3.3 MMOL/L (ref 3.5–5.1)
PROT SERPL-MCNC: 6.7 G/DL (ref 6.4–8.2)
PROT UR STRIP-MCNC: NEGATIVE MG/DL
RBC # BLD AUTO: 4.52 M/UL (ref 3.8–5.2)
RBC #/AREA URNS HPF: ABNORMAL /HPF (ref 0–5)
SODIUM SERPL-SCNC: 138 MMOL/L (ref 136–145)
SP GR UR REFRACTOMETRY: 1.01 (ref 1–1.03)
TRIGL SERPL-MCNC: 49 MG/DL
UROBILINOGEN UR QL STRIP.AUTO: 0.2 EU/DL (ref 0.2–1)
VLDLC SERPL CALC-MCNC: 9.8 MG/DL
WBC # BLD AUTO: 5.9 K/UL (ref 3.6–11)
WBC URNS QL MICRO: ABNORMAL /HPF (ref 0–4)

## 2024-06-03 PROCEDURE — 1123F ACP DISCUSS/DSCN MKR DOCD: CPT | Performed by: FAMILY MEDICINE

## 2024-06-03 PROCEDURE — 83036 HEMOGLOBIN GLYCOSYLATED A1C: CPT | Performed by: FAMILY MEDICINE

## 2024-06-03 PROCEDURE — 3078F DIAST BP <80 MM HG: CPT | Performed by: FAMILY MEDICINE

## 2024-06-03 PROCEDURE — G8399 PT W/DXA RESULTS DOCUMENT: HCPCS | Performed by: FAMILY MEDICINE

## 2024-06-03 PROCEDURE — PBSHW AMB POC HEMOGLOBIN A1C: Performed by: FAMILY MEDICINE

## 2024-06-03 PROCEDURE — 2022F DILAT RTA XM EVC RTNOPTHY: CPT | Performed by: FAMILY MEDICINE

## 2024-06-03 PROCEDURE — 99214 OFFICE O/P EST MOD 30 MIN: CPT | Performed by: FAMILY MEDICINE

## 2024-06-03 PROCEDURE — 3046F HEMOGLOBIN A1C LEVEL >9.0%: CPT | Performed by: FAMILY MEDICINE

## 2024-06-03 PROCEDURE — G8417 CALC BMI ABV UP PARAM F/U: HCPCS | Performed by: FAMILY MEDICINE

## 2024-06-03 PROCEDURE — G8427 DOCREV CUR MEDS BY ELIG CLIN: HCPCS | Performed by: FAMILY MEDICINE

## 2024-06-03 PROCEDURE — G0439 PPPS, SUBSEQ VISIT: HCPCS | Performed by: FAMILY MEDICINE

## 2024-06-03 PROCEDURE — PBSHW AMB POC GLUCOSE BLOOD, BY GLUCOSE MONITORING DEVICE: Performed by: FAMILY MEDICINE

## 2024-06-03 PROCEDURE — 3075F SYST BP GE 130 - 139MM HG: CPT | Performed by: FAMILY MEDICINE

## 2024-06-03 PROCEDURE — 1090F PRES/ABSN URINE INCON ASSESS: CPT | Performed by: FAMILY MEDICINE

## 2024-06-03 PROCEDURE — 3017F COLORECTAL CA SCREEN DOC REV: CPT | Performed by: FAMILY MEDICINE

## 2024-06-03 PROCEDURE — 1036F TOBACCO NON-USER: CPT | Performed by: FAMILY MEDICINE

## 2024-06-03 PROCEDURE — 82962 GLUCOSE BLOOD TEST: CPT | Performed by: FAMILY MEDICINE

## 2024-06-03 ASSESSMENT — PATIENT HEALTH QUESTIONNAIRE - PHQ9
SUM OF ALL RESPONSES TO PHQ9 QUESTIONS 1 & 2: 0
2. FEELING DOWN, DEPRESSED OR HOPELESS: NOT AT ALL
SUM OF ALL RESPONSES TO PHQ QUESTIONS 1-9: 0
SUM OF ALL RESPONSES TO PHQ QUESTIONS 1-9: 0
1. LITTLE INTEREST OR PLEASURE IN DOING THINGS: NOT AT ALL
SUM OF ALL RESPONSES TO PHQ QUESTIONS 1-9: 0
SUM OF ALL RESPONSES TO PHQ QUESTIONS 1-9: 0

## 2024-06-03 ASSESSMENT — ENCOUNTER SYMPTOMS
SHORTNESS OF BREATH: 0
CONSTIPATION: 0
ABDOMINAL PAIN: 0
CHEST TIGHTNESS: 0
COUGH: 0
BLOOD IN STOOL: 0
RHINORRHEA: 0

## 2024-06-03 NOTE — PATIENT INSTRUCTIONS
feeling in the chest.     Sweating.     Shortness of breath.     Pain, pressure, or a strange feeling in the back, neck, jaw, or upper belly or in one or both shoulders or arms.     Lightheadedness or sudden weakness.     A fast or irregular heartbeat.   After you call 911, the  may tell you to chew 1 adult-strength or 2 to 4 low-dose aspirin. Wait for an ambulance. Do not try to drive yourself.  Watch closely for changes in your health, and be sure to contact your doctor if you have any problems.  Where can you learn more?  Go to https://[email or web address].net/patientEd and enter F075 to learn more about \"A Healthy Heart: Care Instructions.\"  Current as of: June 24, 2023               Content Version: 14.0  © 2305-2622 [employer].   Care instructions adapted under license by [employer]. If you have questions about a medical condition or this instruction, always ask your healthcare professional. [employer] disclaims any warranty or liability for your use of this information.      Personalized Preventive Plan for Alice Schwab - 6/3/2024  Medicare offers a range of preventive health benefits. Some of the tests and screenings are paid in full while other may be subject to a deductible, co-insurance, and/or copay.    Some of these benefits include a comprehensive review of your medical history including lifestyle, illnesses that may run in your family, and various assessments and screenings as appropriate.    After reviewing your medical record and screening and assessments performed today your provider may have ordered immunizations, labs, imaging, and/or referrals for you.  A list of these orders (if applicable) as well as your Preventive Care list are included within your After Visit Summary for your review.    Other Preventive Recommendations:    A preventive eye exam performed by an eye specialist is recommended every 1-2 years to screen for glaucoma; cataracts, macular

## 2024-06-03 NOTE — PROGRESS NOTES
Medicare Annual Wellness Visit    Alice Schwab is here for Medicare AWV    Assessment & Plan   Medicare annual wellness visit, subsequent  Essential (primary) hypertension  Type 2 diabetes mellitus with diabetic polyneuropathy, without long-term current use of insulin (HCC)  -     AMB POC GLUCOSE BLOOD, BY GLUCOSE MONITORING DEVICE  -     AMB POC HEMOGLOBIN A1C  -     Microalbumin / Creatinine Urine Ratio; Future  -     Semaglutide 14 MG TABS; Take 14 mg by mouth every morning (before breakfast), Disp-90 tablet, R-3Normal  Mixed hyperlipidemia  -     Lipid Panel; Future  Primary osteoarthritis involving multiple joints  Cervicalgia  -     [facility]  Degenerative disc disease, cervical  -     [facility]  Encounter for long-term (current) use of medications  -     Comprehensive Metabolic Panel; Future  -     CBC; Future  -     Urinalysis with Microscopic; Future  Non morbid obesity    Recommendations for Preventive Services Due: see orders and patient instructions/AVS.  Recommended screening schedule for the next 5-10 years is provided to the patient in written form: see Patient Instructions/AVS.     Return in about 5 months (around 11/3/2024).     Subjective   Patient's complete Health Risk Assessment and screening values have been reviewed and are found in Flowsheets. The following problems were reviewed today and where indicated follow up appointments were made and/or referrals ordered.    Positive Risk Factor Screenings with Interventions:               General HRA Questions:  Select all that apply: (!) New or Increased Pain    Pain Interventions:  See AVS for additional education material      Activity, Diet, and Weight:  On average, how many days per week do you engage in moderate to strenuous exercise (like a brisk walk)?: 2 days  On average, how many minutes do you engage in exercise at this level?: 30 min    Do you eat

## 2024-06-03 NOTE — PROGRESS NOTES
Subjective:      Patient ID: Alice Shcwab is a 68 y.o. female.    HPI  Follow up on chronic medical problems.  Overall feeling well.    HTN follow up:  Compliant w/ meds, low salt diet, and exercise.  No home bp monitoring.  No swelling, headache or dizziness.  No chest pain, SOB, palpitations.  Otherwise feeling well since the last visit.  Had negative cardiac eval for abnormal EKG and chest pain June 2023 as per cardiology.     DM type II follow up:  Compliant w/ meds, diabetic diet, and exercise.  Tolerating rybelsus.  Has helped with her appetite but has not been able to lose the weight.  We discussed increasing her dose to help with weight reduction.      Obtains home glucose monitoring averaging 100-140.  Checks BS daily on most days and prn.  No low BS.  Pt does not have BS log at visit today.   Denies any tingling sensation, polyuria and polydipsia.  No blurred vision.    Hypercholesterolemia follow up:  Compliant w/ low fat, low cholesterol diet.  Exercising some.  Tolerating crestor.       Osteoarthritis:  Patient has osteoarthritis.  Has migratory joint aching and stiffness that comes and goes.   She has been taking motrin for the pain.  Also add lyrica which has helped some.  Pain is 5/10.  She has been using ice which she prefers over heat.  Has had ALAN but is not wanting to get anymore injections at this time.    C/o increase neck pain for the past few weeks. Sx comes and goes. Pain is worse on the left side of the neck.  No radiation of pain.  Pain is 5-6/10 when at its worse. Increase pain with ROM of neck.  She wants to do PT.      Symptoms onset: problem is longstanding.  Rheumatological ROS: stable, mild-to-moderate joint symptoms intermittently, reasonably well controlled by PRN meds.   Response to treatment plan: stable and intermittent.   HM:  Mammogram  11/2/23  Colonoscopy 4/25/2018 by Dr. Dill- repeat in 10 years     Past Medical History:   Diagnosis Date    DJD (degenerative joint

## 2024-06-03 NOTE — PROGRESS NOTES
Chief Complaint   Patient presents with    Medicare AWV       \"Have you been to the ER, urgent care clinic since your last visit?  Hospitalized since your last visit?\"    NO    “Have you seen or consulted any other health care providers outside of [facility] since your last visit?”    Yes, Dr. Farmer Podiatrist        Vitals:    24 1031 24 1039   BP: (!) 144/71 134/76   Site: Right Upper Arm Right Upper Arm   Position: Sitting Sitting   Cuff Size: Large Adult Large Adult   Pulse: 68    Resp: 18    Temp: 98.2 °F (36.8 °C)    TempSrc: Oral    SpO2: 99%    Weight: 89.5 kg (197 lb 6.4 oz)    Height: 1.575 m (5' 2\")           There were no vitals filed for this visit.    Health Maintenance Due   Topic Date Due    Shingles vaccine (1 of 2) Never done    Respiratory Syncytial Virus (RSV) Pregnant or age 60 yrs+ (1 - 1-dose 60+ series) Never done    COVID-19 Vaccine ( season) 2023    Annual Wellness Visit (Medicare)  2024    Diabetic Alb to Cr ratio (uACR) test  2024        The patient, Alice Schwab, identity was verified by name and .

## 2024-06-04 RX ORDER — POTASSIUM CHLORIDE 20 MEQ/1
20 TABLET, EXTENDED RELEASE ORAL DAILY
Qty: 30 TABLET | Refills: 5 | Status: SHIPPED | OUTPATIENT
Start: 2024-06-04

## 2024-06-05 ENCOUNTER — TELEPHONE (OUTPATIENT)
Age: 69
End: 2024-06-05

## 2024-06-05 NOTE — TELEPHONE ENCOUNTER
Message from pharmacy:  Patient hasn't filled Rybelsus 7mg since November 2023. Due to the large gap in therapy, is it ok for patient to take the new 14mg dose? Please advise    [facility] #[phone]  Rx #[id number]      For Pharmacy Admin Tracking Only    Program: Medication Refill  CPA in place:    Recommendation Provided To:   Intervention Detail: New Rx: 1, reason: Improve Adherence  Intervention Accepted By:   Gap Closed?:    Time Spent (min): 5

## 2024-07-31 DIAGNOSIS — M15.9 PRIMARY OSTEOARTHRITIS INVOLVING MULTIPLE JOINTS: ICD-10-CM

## 2024-08-01 RX ORDER — PREGABALIN 50 MG/1
CAPSULE ORAL
Qty: 90 CAPSULE | Refills: 0 | Status: SHIPPED | OUTPATIENT
Start: 2024-08-01 | End: 2025-01-28

## 2024-08-01 NOTE — TELEPHONE ENCOUNTER
Last appointment: 6/3/24  Next appointment: 11/4/24  Previous refill encounter(s): 8/1/23    Requested Prescriptions     Pending Prescriptions Disp Refills    pregabalin (LYRICA) 50 MG capsule [Pharmacy Med Name: PREGABALIN 50MG CAPSULES] 90 capsule 5     Sig: TAKE 1 CAPSULE BY MOUTH THREE TIMES DAILY. MAX DAILY AMOUNT: 150 MG         For Pharmacy Admin Tracking Only    Program: Medication Refill  CPA in place:    Recommendation Provided To:   Intervention Detail: New Rx: 1, reason: Patient Preference  Intervention Accepted By:   Gap Closed?:    Time Spent (min): 5

## 2024-08-06 NOTE — TELEPHONE ENCOUNTER
Patient call asking for callback.  Contacted patient and stated she was requesting her refill of pregabalin.  Noted rx sent on 8/1/24 for #90.    Patient stated they have not called her.  She is checking with them now.  Will call back if any issue.  Thankspurvi    For Pharmacy Admin Tracking Only    Program: Medication Refill  CPA in place:    Recommendation Provided To:   Intervention Detail: Discontinued Rx: 1, reason: Duplicate Therapy  Intervention Accepted By:   Gap Closed?:    Time Spent (min): 5

## 2024-09-19 ENCOUNTER — TRANSCRIBE ORDERS (OUTPATIENT)
Dept: SCHEDULING | Age: 69
End: 2024-09-19

## 2024-09-19 DIAGNOSIS — Z12.31 SCREENING MAMMOGRAM FOR HIGH-RISK PATIENT: Primary | ICD-10-CM

## 2024-11-04 ENCOUNTER — OFFICE VISIT (OUTPATIENT)
Age: 69
End: 2024-11-04
Payer: MEDICARE

## 2024-11-04 VITALS
DIASTOLIC BLOOD PRESSURE: 77 MMHG | HEART RATE: 68 BPM | BODY MASS INDEX: 35.44 KG/M2 | SYSTOLIC BLOOD PRESSURE: 160 MMHG | OXYGEN SATURATION: 99 % | WEIGHT: 192.6 LBS | TEMPERATURE: 98.6 F | RESPIRATION RATE: 18 BRPM | HEIGHT: 62 IN

## 2024-11-04 DIAGNOSIS — E66.9 NON MORBID OBESITY: ICD-10-CM

## 2024-11-04 DIAGNOSIS — E78.2 MIXED HYPERLIPIDEMIA: ICD-10-CM

## 2024-11-04 DIAGNOSIS — E11.42 TYPE 2 DIABETES MELLITUS WITH DIABETIC POLYNEUROPATHY, WITHOUT LONG-TERM CURRENT USE OF INSULIN (HCC): ICD-10-CM

## 2024-11-04 DIAGNOSIS — I10 ESSENTIAL (PRIMARY) HYPERTENSION: Primary | ICD-10-CM

## 2024-11-04 DIAGNOSIS — Z79.899 ENCOUNTER FOR LONG-TERM (CURRENT) USE OF MEDICATIONS: ICD-10-CM

## 2024-11-04 DIAGNOSIS — K04.7 TOOTH ABSCESS: ICD-10-CM

## 2024-11-04 LAB
ALBUMIN SERPL-MCNC: 3.9 G/DL (ref 3.5–5)
ALBUMIN/GLOB SERPL: 1.3 (ref 1.1–2.2)
ALP SERPL-CCNC: 86 U/L (ref 45–117)
ALT SERPL-CCNC: 24 U/L (ref 12–78)
ANION GAP SERPL CALC-SCNC: 7 MMOL/L (ref 2–12)
AST SERPL-CCNC: 24 U/L (ref 15–37)
BILIRUB SERPL-MCNC: 0.8 MG/DL (ref 0.2–1)
BUN SERPL-MCNC: 13 MG/DL (ref 6–20)
BUN/CREAT SERPL: 17 (ref 12–20)
CALCIUM SERPL-MCNC: 9.4 MG/DL (ref 8.5–10.1)
CHLORIDE SERPL-SCNC: 104 MMOL/L (ref 97–108)
CHOLEST SERPL-MCNC: 155 MG/DL
CO2 SERPL-SCNC: 29 MMOL/L (ref 21–32)
CREAT SERPL-MCNC: 0.78 MG/DL (ref 0.55–1.02)
GLOBULIN SER CALC-MCNC: 3 G/DL (ref 2–4)
GLUCOSE SERPL-MCNC: 100 MG/DL (ref 65–100)
GLUCOSE, POC: 125 MG/DL
HBA1C MFR BLD: 6.2 %
HDLC SERPL-MCNC: 60 MG/DL
HDLC SERPL: 2.6 (ref 0–5)
LDLC SERPL CALC-MCNC: 82.2 MG/DL (ref 0–100)
POTASSIUM SERPL-SCNC: 3.2 MMOL/L (ref 3.5–5.1)
PROT SERPL-MCNC: 6.9 G/DL (ref 6.4–8.2)
SODIUM SERPL-SCNC: 140 MMOL/L (ref 136–145)
TRIGL SERPL-MCNC: 64 MG/DL
VLDLC SERPL CALC-MCNC: 12.8 MG/DL

## 2024-11-04 PROCEDURE — 83036 HEMOGLOBIN GLYCOSYLATED A1C: CPT | Performed by: FAMILY MEDICINE

## 2024-11-04 PROCEDURE — 82962 GLUCOSE BLOOD TEST: CPT | Performed by: FAMILY MEDICINE

## 2024-11-04 PROCEDURE — 99214 OFFICE O/P EST MOD 30 MIN: CPT | Performed by: FAMILY MEDICINE

## 2024-11-04 RX ORDER — KETOCONAZOLE 20 MG/G
CREAM TOPICAL
COMMUNITY
Start: 2024-09-24

## 2024-11-04 ASSESSMENT — ENCOUNTER SYMPTOMS
BLOOD IN STOOL: 0
CHEST TIGHTNESS: 0
RHINORRHEA: 0
ABDOMINAL PAIN: 0
CONSTIPATION: 0
SHORTNESS OF BREATH: 0
COUGH: 0

## 2024-11-04 ASSESSMENT — PATIENT HEALTH QUESTIONNAIRE - PHQ9
2. FEELING DOWN, DEPRESSED OR HOPELESS: NOT AT ALL
SUM OF ALL RESPONSES TO PHQ QUESTIONS 1-9: 0
SUM OF ALL RESPONSES TO PHQ QUESTIONS 1-9: 0
1. LITTLE INTEREST OR PLEASURE IN DOING THINGS: NOT AT ALL
SUM OF ALL RESPONSES TO PHQ QUESTIONS 1-9: 0
SUM OF ALL RESPONSES TO PHQ9 QUESTIONS 1 & 2: 0
SUM OF ALL RESPONSES TO PHQ QUESTIONS 1-9: 0

## 2024-11-04 NOTE — PROGRESS NOTES
Subjective:      Patient ID: Alice Schwab is a 69 y.o. female.    HPI  Follow up on chronic medical problems.  C/o left lower tooth pain and has seen her dentist.  Waiting to get root canal but her appt is not until the November 14th.  Taking tylenol and motrin for the pain but still has been hurting and thinks she needs something stronger for the pain.  Advise her to call her dentist back to get something for the pain    HTN follow up:  Compliant w/ meds, low salt diet, and exercise.  No home bp monitoring.  thinks that her BP is up toady from the mouth pain.    No swelling, headache or dizziness.  No chest pain, SOB, palpitations. Otherwise feeling well since the last visit.  Had negative cardiac eval for abnormal EKG and chest pain June 2023 as per cardiology.     DM type II follow up:  Compliant w/ meds, diabetic diet, and exercise.  Tolerating rybelsus.  Has helped with her appetite.  Weight is down by 5 pounds since her last visit in June.        Obtains home glucose monitoring averaging 100-140.  Checks BS daily on most days and prn.  No low BS.  Pt does not have BS log at visit today.   Denies any tingling sensation, polyuria and polydipsia.  No blurred vision.  sees podiatry for foot care.   Hypercholesterolemia follow up:  Compliant w/ meds, low fat, low cholesterol diet.  Exercising some.  No muscle nor abdominal pain, no skin discoloration.  Patient fasting today.      Osteoarthritis:  Patient has osteoarthritis.  Has migratory joint aching and stiffness that comes and goes.   has back pain also that comes and goes but overall has been stable.  She has been taking motrin for the pain.  Also added lyrica which has helped some.  Pain is 5/10.  She has been using ice which she prefers over heat.  Has had ALAN for pain in the back but is not wanting to get anymore injections at this time.    Symptoms onset: problem is longstanding.  Rheumatological ROS: stable, mild-to-moderate joint symptoms

## 2024-11-04 NOTE — PROGRESS NOTES
Chief Complaint   Patient presents with    Follow-up     Patient is here today for a 5 month follow-up       \"Have you been to the ER, urgent care clinic since your last visit?  Hospitalized since your last visit?\"    NO    “Have you seen or consulted any other health care providers outside of [facility] since your last visit?”    YES, Dentist Dr. Green      Vitals:    24 1039 24 1047   BP: (!) 175/86 (!) 160/77   Site: Right Upper Arm Left Upper Arm   Position: Sitting Sitting   Cuff Size: Large Adult Large Adult   Pulse: 68    Resp: 18    Temp: 98.6 °F (37 °C)    TempSrc: Oral    SpO2: 99%    Weight: 87.4 kg (192 lb 9.6 oz)    Height: 1.575 m (5' 2\")           Click Here for Release of Records Request      There were no vitals filed for this visit.    Health Maintenance Due   Topic Date Due    DTaP/Tdap/Td vaccine (2 - Td or Tdap) 2022    Diabetic retinal exam  2024    COVID-19 Vaccine ( season) 2024    Diabetic foot exam  2024        The patient, Alice Schwab, identity was verified by name and .

## 2024-11-07 RX ORDER — POTASSIUM CHLORIDE 1500 MG/1
20 TABLET, EXTENDED RELEASE ORAL 2 TIMES DAILY
Qty: 60 TABLET | Refills: 5 | Status: SHIPPED | OUTPATIENT
Start: 2024-11-07

## 2024-12-05 RX ORDER — VALSARTAN AND HYDROCHLOROTHIAZIDE 320; 25 MG/1; MG/1
1 TABLET, FILM COATED ORAL DAILY
Qty: 90 TABLET | Refills: 3 | Status: SHIPPED | OUTPATIENT
Start: 2024-12-05

## 2024-12-05 NOTE — TELEPHONE ENCOUNTER
Last appointment: 11/4/24  Next appointment: 12/16/24  Previous refill encounter(s): 10/23/23    Requested Prescriptions     Pending Prescriptions Disp Refills    valsartan-hydroCHLOROthiazide (DIOVAN-HCT) 320-25 MG per tablet [Pharmacy Med Name: VALSARTAN/HCTZ 320MG/25MG TABLETS] 90 tablet 3     Sig: TAKE 1 TABLET BY MOUTH DAILY         For Pharmacy Admin Tracking Only    Program: Medication Refill  CPA in place:    Recommendation Provided To:   Intervention Detail: New Rx: 1, reason: Patient Preference  Intervention Accepted By:   Gap Closed?:    Time Spent (min): 5

## 2024-12-13 DIAGNOSIS — M15.0 PRIMARY OSTEOARTHRITIS INVOLVING MULTIPLE JOINTS: ICD-10-CM

## 2024-12-13 RX ORDER — PREGABALIN 50 MG/1
CAPSULE ORAL
Qty: 90 CAPSULE | Refills: 5 | Status: SHIPPED | OUTPATIENT
Start: 2024-12-13 | End: 2025-06-11

## 2024-12-13 NOTE — TELEPHONE ENCOUNTER
Last appointment: 11/4/24  Next appointment: 1/10/25  Previous refill encounter(s): 8/1/24 #90    Requested Prescriptions     Pending Prescriptions Disp Refills    pregabalin (LYRICA) 50 MG capsule [Pharmacy Med Name: PREGABALIN 50MG CAPSULES] 90 capsule 5     Sig: TAKE 1 CAPSULE BY MOUTH THREE TIMES DAILY. MAX DAILY AMOUNT: 150 MG         For Pharmacy Admin Tracking Only    Program: Medication Refill  CPA in place:    Recommendation Provided To:   Intervention Detail: New Rx: 1, reason: Patient Preference  Intervention Accepted By:   Gap Closed?:    Time Spent (min): 5

## 2024-12-24 ENCOUNTER — HOSPITAL ENCOUNTER (OUTPATIENT)
Facility: HOSPITAL | Age: 69
Discharge: HOME | End: 2024-12-27
Attending: FAMILY MEDICINE
Payer: MEDICARE

## 2024-12-24 DIAGNOSIS — Z12.31 ENCOUNTER FOR SCREENING MAMMOGRAM FOR HIGH-RISK PATIENT: ICD-10-CM

## 2024-12-24 PROCEDURE — 77063 BREAST TOMOSYNTHESIS BI: CPT

## 2025-02-24 ENCOUNTER — OFFICE VISIT (OUTPATIENT)
Age: 70
End: 2025-02-24
Payer: MEDICARE

## 2025-02-24 VITALS
HEART RATE: 68 BPM | SYSTOLIC BLOOD PRESSURE: 128 MMHG | DIASTOLIC BLOOD PRESSURE: 65 MMHG | WEIGHT: 194.8 LBS | RESPIRATION RATE: 18 BRPM | BODY MASS INDEX: 35.63 KG/M2 | OXYGEN SATURATION: 99 % | TEMPERATURE: 97.2 F

## 2025-02-24 DIAGNOSIS — M15.0 PRIMARY OSTEOARTHRITIS INVOLVING MULTIPLE JOINTS: ICD-10-CM

## 2025-02-24 DIAGNOSIS — H66.91 ACUTE RIGHT OTITIS MEDIA: ICD-10-CM

## 2025-02-24 DIAGNOSIS — I10 ESSENTIAL (PRIMARY) HYPERTENSION: Primary | ICD-10-CM

## 2025-02-24 DIAGNOSIS — Z79.899 ENCOUNTER FOR LONG-TERM (CURRENT) USE OF MEDICATIONS: ICD-10-CM

## 2025-02-24 DIAGNOSIS — E11.42 TYPE 2 DIABETES MELLITUS WITH DIABETIC POLYNEUROPATHY, WITHOUT LONG-TERM CURRENT USE OF INSULIN (HCC): ICD-10-CM

## 2025-02-24 DIAGNOSIS — K08.89 PAIN, DENTAL: ICD-10-CM

## 2025-02-24 DIAGNOSIS — E66.9 NON MORBID OBESITY: ICD-10-CM

## 2025-02-24 DIAGNOSIS — H61.21 RIGHT EAR IMPACTED CERUMEN: ICD-10-CM

## 2025-02-24 DIAGNOSIS — E78.2 MIXED HYPERLIPIDEMIA: ICD-10-CM

## 2025-02-24 LAB
GLUCOSE, POC: 132 MG/DL
HBA1C MFR BLD: 6.1 %

## 2025-02-24 PROCEDURE — 99214 OFFICE O/P EST MOD 30 MIN: CPT | Performed by: FAMILY MEDICINE

## 2025-02-24 PROCEDURE — 82962 GLUCOSE BLOOD TEST: CPT | Performed by: FAMILY MEDICINE

## 2025-02-24 PROCEDURE — 83036 HEMOGLOBIN GLYCOSYLATED A1C: CPT | Performed by: FAMILY MEDICINE

## 2025-02-24 RX ORDER — AMOXICILLIN 500 MG/1
500 CAPSULE ORAL 3 TIMES DAILY
Qty: 30 CAPSULE | Refills: 0 | Status: SHIPPED | OUTPATIENT
Start: 2025-02-24 | End: 2025-03-06

## 2025-02-24 SDOH — ECONOMIC STABILITY: FOOD INSECURITY: WITHIN THE PAST 12 MONTHS, THE FOOD YOU BOUGHT JUST DIDN'T LAST AND YOU DIDN'T HAVE MONEY TO GET MORE.: NEVER TRUE

## 2025-02-24 SDOH — ECONOMIC STABILITY: FOOD INSECURITY: WITHIN THE PAST 12 MONTHS, YOU WORRIED THAT YOUR FOOD WOULD RUN OUT BEFORE YOU GOT MONEY TO BUY MORE.: NEVER TRUE

## 2025-02-24 ASSESSMENT — PATIENT HEALTH QUESTIONNAIRE - PHQ9
SUM OF ALL RESPONSES TO PHQ QUESTIONS 1-9: 0
SUM OF ALL RESPONSES TO PHQ9 QUESTIONS 1 & 2: 0
2. FEELING DOWN, DEPRESSED OR HOPELESS: NOT AT ALL
SUM OF ALL RESPONSES TO PHQ QUESTIONS 1-9: 0
1. LITTLE INTEREST OR PLEASURE IN DOING THINGS: NOT AT ALL

## 2025-02-24 ASSESSMENT — ENCOUNTER SYMPTOMS
SHORTNESS OF BREATH: 0
ABDOMINAL PAIN: 0
FACIAL SWELLING: 0
CONSTIPATION: 0
RHINORRHEA: 0
CHEST TIGHTNESS: 0
BLOOD IN STOOL: 0
COUGH: 0

## 2025-02-24 NOTE — PROGRESS NOTES
Chief Complaint   Patient presents with    6 week follow up        \"Have you been to the ER, urgent care clinic since your last visit?  Hospitalized since your last visit?\"    NO    “Have you seen or consulted any other health care providers outside of [facility] since your last visit?”    NO            Click Here for Release of Records Request       Vitals:    25 1132   BP: 128/65   Pulse: 68   Resp: 18   Temp: 97.2 °F (36.2 °C)   SpO2: 99%      There are no preventive care reminders to display for this patient.     The patient, Ailce Schwab, identity was verified by name and .

## 2025-02-24 NOTE — PROGRESS NOTES
Subjective:      Patient ID: Alice Schwab is a 69 y.o. female.    HPI  Follow up on chronic medical problems.  C/o right upper tooth pain started over the past week.  Has appt with dentist on this Thursday.  Has pain radiation to the right ear and aching into the right side of the face.  Taking tylenol and motrin for the pain but still has been hurting.     HTN follow up:  Compliant w/ meds, low salt diet, and exercise.    No swelling, headache or dizziness.  No chest pain, SOB, palpitations. Otherwise feeling well since the last visit.    Had negative cardiac eval for abnormal EKG and chest pain June 2023 as per cardiology.     DM type II follow up:  Compliant w/ meds, diabetic diet, and exercise.  Tolerating rybelsus.  Has helped with her appetite.    Obtains home glucose monitoring averaging 100-140.  Checks BS daily on most days and prn.  No low BS.  Pt does not have BS log at visit today.   Denies any tingling sensation, polyuria and polydipsia.  No blurred vision.  sees podiatry for foot care.   Hypercholesterolemia follow up:  Compliant w/ meds, low fat, low cholesterol diet.  Exercising some.  No muscle nor abdominal pain, no skin discoloration.    Osteoarthritis:  Patient has osteoarthritis.  Has migratory joint aching and stiffness that comes and goes.   has back pain also that comes and goes but overall has been stable.  She has been taking motrin for the pain.  Also added lyrica which has helped some.  Pain is 5/10.  She has been using ice which she prefers over heat.  Has had ALAN for pain in the back but is not wanting to get anymore injections at this time.    Symptoms onset: problem is longstanding.  Rheumatological ROS: stable, mild-to-moderate joint symptoms intermittently, reasonably well controlled by PRN meds.   Response to treatment plan: stable and intermittent.   HM:  Mammogram  12/24/24  Colonoscopy 4/25/2018 by Dr. Dill- repeat in 10 years     Past Medical History:   Diagnosis Date

## 2025-02-28 RX ORDER — ROSUVASTATIN CALCIUM 40 MG/1
40 TABLET, COATED ORAL NIGHTLY
Qty: 90 TABLET | Refills: 3 | Status: SHIPPED | OUTPATIENT
Start: 2025-02-28

## 2025-02-28 NOTE — TELEPHONE ENCOUNTER
Last appointment: 2/24/25  Next appointment: 7/28/25  Previous refill encounter(s): 2/13/24    Requested Prescriptions     Pending Prescriptions Disp Refills    rosuvastatin (CRESTOR) 40 MG tablet [Pharmacy Med Name: ROSUVASTATIN TABS 40MG] 90 tablet 3     Sig: TAKE 1 TABLET NIGHTLY         For Pharmacy Admin Tracking Only    Program: Medication Refill  CPA in place:    Recommendation Provided To:   Intervention Detail: New Rx: 1, reason: Patient Preference  Intervention Accepted By:   Gap Closed?:    Time Spent (min): 5

## 2025-05-29 DIAGNOSIS — E11.42 TYPE 2 DIABETES MELLITUS WITH DIABETIC POLYNEUROPATHY, WITHOUT LONG-TERM CURRENT USE OF INSULIN (HCC): ICD-10-CM

## 2025-05-29 RX ORDER — ORAL SEMAGLUTIDE 14 MG/1
1 TABLET ORAL
Qty: 90 TABLET | Refills: 3 | Status: SHIPPED | OUTPATIENT
Start: 2025-05-29

## 2025-05-29 NOTE — TELEPHONE ENCOUNTER
Last appointment: 2/24/25  Next appointment: 7/28/25  Previous refill encounter(s): 6/3/24    Requested Prescriptions     Pending Prescriptions Disp Refills    RYBELSUS 14 MG TABS [Pharmacy Med Name: RYBELSUS TABS 30'S 14MG] 90 tablet 3     Sig: TAKE 1 TABLET EVERY MORNING BEFORE BREAKFAST         For Pharmacy Admin Tracking Only    Program: Medication Refill  CPA in place:    Recommendation Provided To:   Intervention Detail: New Rx: 1, reason: Patient Preference  Intervention Accepted By:   Gap Closed?:    Time Spent (min): 5

## 2025-07-24 ENCOUNTER — TELEPHONE (OUTPATIENT)
Age: 70
End: 2025-07-24

## 2025-07-24 ENCOUNTER — TRANSCRIBE ORDERS (OUTPATIENT)
Facility: HOSPITAL | Age: 70
End: 2025-07-24

## 2025-07-24 DIAGNOSIS — Z12.31 VISIT FOR SCREENING MAMMOGRAM: Primary | ICD-10-CM

## 2025-07-24 SDOH — HEALTH STABILITY: PHYSICAL HEALTH: ON AVERAGE, HOW MANY DAYS PER WEEK DO YOU ENGAGE IN MODERATE TO STRENUOUS EXERCISE (LIKE A BRISK WALK)?: 7 DAYS

## 2025-07-24 ASSESSMENT — PATIENT HEALTH QUESTIONNAIRE - PHQ9
SUM OF ALL RESPONSES TO PHQ QUESTIONS 1-9: 0
SUM OF ALL RESPONSES TO PHQ QUESTIONS 1-9: 0
2. FEELING DOWN, DEPRESSED OR HOPELESS: NOT AT ALL
SUM OF ALL RESPONSES TO PHQ QUESTIONS 1-9: 0
SUM OF ALL RESPONSES TO PHQ QUESTIONS 1-9: 0
1. LITTLE INTEREST OR PLEASURE IN DOING THINGS: NOT AT ALL

## 2025-07-24 ASSESSMENT — LIFESTYLE VARIABLES
HOW OFTEN DO YOU HAVE A DRINK CONTAINING ALCOHOL: 1
HOW OFTEN DO YOU HAVE SIX OR MORE DRINKS ON ONE OCCASION: 1
HOW OFTEN DO YOU HAVE A DRINK CONTAINING ALCOHOL: NEVER
HOW MANY STANDARD DRINKS CONTAINING ALCOHOL DO YOU HAVE ON A TYPICAL DAY: PATIENT DOES NOT DRINK
HOW MANY STANDARD DRINKS CONTAINING ALCOHOL DO YOU HAVE ON A TYPICAL DAY: 0

## 2025-07-24 NOTE — TELEPHONE ENCOUNTER
Attempted to contact patient regarding upcoming Medicare wellness appointment and completion of HRA questionnaire. LVM for patient to please return call at  [phone].

## 2025-07-28 ENCOUNTER — OFFICE VISIT (OUTPATIENT)
Age: 70
End: 2025-07-28
Payer: MEDICARE

## 2025-07-28 VITALS
RESPIRATION RATE: 18 BRPM | HEART RATE: 76 BPM | DIASTOLIC BLOOD PRESSURE: 76 MMHG | SYSTOLIC BLOOD PRESSURE: 126 MMHG | WEIGHT: 194.4 LBS | OXYGEN SATURATION: 97 % | TEMPERATURE: 98.1 F | HEIGHT: 60 IN | BODY MASS INDEX: 38.17 KG/M2

## 2025-07-28 DIAGNOSIS — E11.42 TYPE 2 DIABETES MELLITUS WITH DIABETIC POLYNEUROPATHY, WITHOUT LONG-TERM CURRENT USE OF INSULIN (HCC): ICD-10-CM

## 2025-07-28 DIAGNOSIS — E78.2 MIXED HYPERLIPIDEMIA: ICD-10-CM

## 2025-07-28 DIAGNOSIS — M15.0 PRIMARY OSTEOARTHRITIS INVOLVING MULTIPLE JOINTS: ICD-10-CM

## 2025-07-28 DIAGNOSIS — M54.41 CHRONIC RIGHT-SIDED LOW BACK PAIN WITH RIGHT-SIDED SCIATICA: ICD-10-CM

## 2025-07-28 DIAGNOSIS — Z00.00 MEDICARE ANNUAL WELLNESS VISIT, SUBSEQUENT: Primary | ICD-10-CM

## 2025-07-28 DIAGNOSIS — I10 ESSENTIAL (PRIMARY) HYPERTENSION: ICD-10-CM

## 2025-07-28 DIAGNOSIS — M65.311 TRIGGER FINGER OF RIGHT THUMB: ICD-10-CM

## 2025-07-28 DIAGNOSIS — E87.6 HYPOKALEMIA: ICD-10-CM

## 2025-07-28 DIAGNOSIS — Z79.899 ENCOUNTER FOR LONG-TERM (CURRENT) USE OF MEDICATIONS: ICD-10-CM

## 2025-07-28 DIAGNOSIS — G89.29 CHRONIC RIGHT-SIDED LOW BACK PAIN WITH RIGHT-SIDED SCIATICA: ICD-10-CM

## 2025-07-28 DIAGNOSIS — E66.9 NON MORBID OBESITY: ICD-10-CM

## 2025-07-28 LAB
ALBUMIN SERPL-MCNC: 3.8 G/DL (ref 3.5–5)
ALBUMIN/GLOB SERPL: 1.2 (ref 1.1–2.2)
ALP SERPL-CCNC: 94 U/L (ref 45–117)
ALT SERPL-CCNC: 28 U/L (ref 12–78)
ANION GAP SERPL CALC-SCNC: 8 MMOL/L (ref 2–12)
APPEARANCE UR: CLEAR
AST SERPL-CCNC: 27 U/L (ref 15–37)
BACTERIA URNS QL MICRO: NEGATIVE /HPF
BILIRUB SERPL-MCNC: 1.2 MG/DL (ref 0.2–1)
BILIRUB UR QL: NEGATIVE
BUN SERPL-MCNC: 14 MG/DL (ref 6–20)
BUN/CREAT SERPL: 16 (ref 12–20)
CALCIUM SERPL-MCNC: 9.1 MG/DL (ref 8.5–10.1)
CHLORIDE SERPL-SCNC: 102 MMOL/L (ref 97–108)
CHOLEST SERPL-MCNC: 160 MG/DL
CO2 SERPL-SCNC: 27 MMOL/L (ref 21–32)
COLOR UR: ABNORMAL
CREAT SERPL-MCNC: 0.85 MG/DL (ref 0.55–1.02)
CREAT UR-MCNC: 106 MG/DL
EPITH CASTS URNS QL MICRO: ABNORMAL /LPF
ERYTHROCYTE [DISTWIDTH] IN BLOOD BY AUTOMATED COUNT: 13.4 % (ref 11.5–14.5)
GLOBULIN SER CALC-MCNC: 3.2 G/DL (ref 2–4)
GLUCOSE SERPL-MCNC: 112 MG/DL (ref 65–100)
GLUCOSE UR STRIP.AUTO-MCNC: NEGATIVE MG/DL
GLUCOSE, POC: 133 MG/DL
HBA1C MFR BLD: 6.3 %
HCT VFR BLD AUTO: 40 % (ref 35–47)
HDLC SERPL-MCNC: 58 MG/DL
HDLC SERPL: 2.8 (ref 0–5)
HGB BLD-MCNC: 13.2 G/DL (ref 11.5–16)
HGB UR QL STRIP: NEGATIVE
HYALINE CASTS URNS QL MICRO: ABNORMAL /LPF (ref 0–5)
KETONES UR QL STRIP.AUTO: NEGATIVE MG/DL
LDLC SERPL CALC-MCNC: 85.6 MG/DL (ref 0–100)
LEUKOCYTE ESTERASE UR QL STRIP.AUTO: ABNORMAL
MCH RBC QN AUTO: 28 PG (ref 26–34)
MCHC RBC AUTO-ENTMCNC: 33 G/DL (ref 30–36.5)
MCV RBC AUTO: 84.9 FL (ref 80–99)
MICROALBUMIN UR-MCNC: 1.7 MG/DL
MICROALBUMIN/CREAT UR-RTO: 16 MG/G (ref 0–30)
NITRITE UR QL STRIP.AUTO: NEGATIVE
NRBC # BLD: 0 K/UL (ref 0–0.01)
NRBC BLD-RTO: 0 PER 100 WBC
PH UR STRIP: 5.5 (ref 5–8)
PLATELET # BLD AUTO: 195 K/UL (ref 150–400)
PMV BLD AUTO: 10.4 FL (ref 8.9–12.9)
POTASSIUM SERPL-SCNC: 3.3 MMOL/L (ref 3.5–5.1)
PROT SERPL-MCNC: 7 G/DL (ref 6.4–8.2)
PROT UR STRIP-MCNC: NEGATIVE MG/DL
RBC # BLD AUTO: 4.71 M/UL (ref 3.8–5.2)
RBC #/AREA URNS HPF: ABNORMAL /HPF (ref 0–5)
SODIUM SERPL-SCNC: 137 MMOL/L (ref 136–145)
SP GR UR REFRACTOMETRY: 1.01 (ref 1–1.03)
SPECIMEN HOLD: NORMAL
TRIGL SERPL-MCNC: 82 MG/DL
UROBILINOGEN UR QL STRIP.AUTO: 0.2 EU/DL (ref 0.2–1)
VLDLC SERPL CALC-MCNC: 16.4 MG/DL
WBC # BLD AUTO: 4.6 K/UL (ref 3.6–11)
WBC URNS QL MICRO: ABNORMAL /HPF (ref 0–4)

## 2025-07-28 PROCEDURE — G8399 PT W/DXA RESULTS DOCUMENT: HCPCS | Performed by: FAMILY MEDICINE

## 2025-07-28 PROCEDURE — 3046F HEMOGLOBIN A1C LEVEL >9.0%: CPT | Performed by: FAMILY MEDICINE

## 2025-07-28 PROCEDURE — 3017F COLORECTAL CA SCREEN DOC REV: CPT | Performed by: FAMILY MEDICINE

## 2025-07-28 PROCEDURE — 1160F RVW MEDS BY RX/DR IN RCRD: CPT | Performed by: FAMILY MEDICINE

## 2025-07-28 PROCEDURE — G8417 CALC BMI ABV UP PARAM F/U: HCPCS | Performed by: FAMILY MEDICINE

## 2025-07-28 PROCEDURE — G0439 PPPS, SUBSEQ VISIT: HCPCS | Performed by: FAMILY MEDICINE

## 2025-07-28 PROCEDURE — 1090F PRES/ABSN URINE INCON ASSESS: CPT | Performed by: FAMILY MEDICINE

## 2025-07-28 PROCEDURE — G8427 DOCREV CUR MEDS BY ELIG CLIN: HCPCS | Performed by: FAMILY MEDICINE

## 2025-07-28 PROCEDURE — PBSHW AMB POC GLUCOSE BLOOD, BY GLUCOSE MONITORING DEVICE: Performed by: FAMILY MEDICINE

## 2025-07-28 PROCEDURE — 99214 OFFICE O/P EST MOD 30 MIN: CPT | Performed by: FAMILY MEDICINE

## 2025-07-28 PROCEDURE — 83036 HEMOGLOBIN GLYCOSYLATED A1C: CPT | Performed by: FAMILY MEDICINE

## 2025-07-28 PROCEDURE — 3074F SYST BP LT 130 MM HG: CPT | Performed by: FAMILY MEDICINE

## 2025-07-28 PROCEDURE — 3078F DIAST BP <80 MM HG: CPT | Performed by: FAMILY MEDICINE

## 2025-07-28 PROCEDURE — 1159F MED LIST DOCD IN RCRD: CPT | Performed by: FAMILY MEDICINE

## 2025-07-28 PROCEDURE — 1036F TOBACCO NON-USER: CPT | Performed by: FAMILY MEDICINE

## 2025-07-28 PROCEDURE — PBSHW AMB POC HEMOGLOBIN A1C: Performed by: FAMILY MEDICINE

## 2025-07-28 PROCEDURE — 3044F HG A1C LEVEL LT 7.0%: CPT | Performed by: FAMILY MEDICINE

## 2025-07-28 PROCEDURE — 1123F ACP DISCUSS/DSCN MKR DOCD: CPT | Performed by: FAMILY MEDICINE

## 2025-07-28 PROCEDURE — 1126F AMNT PAIN NOTED NONE PRSNT: CPT | Performed by: FAMILY MEDICINE

## 2025-07-28 PROCEDURE — 82962 GLUCOSE BLOOD TEST: CPT | Performed by: FAMILY MEDICINE

## 2025-07-28 PROCEDURE — 2022F DILAT RTA XM EVC RTNOPTHY: CPT | Performed by: FAMILY MEDICINE

## 2025-07-28 RX ORDER — CYCLOBENZAPRINE HCL 5 MG
TABLET ORAL
COMMUNITY
Start: 2025-06-26

## 2025-07-28 RX ORDER — POTASSIUM CHLORIDE 1500 MG/1
20 TABLET, EXTENDED RELEASE ORAL 2 TIMES DAILY
Qty: 60 TABLET | Refills: 11 | Status: SHIPPED | OUTPATIENT
Start: 2025-07-28

## 2025-07-28 ASSESSMENT — ENCOUNTER SYMPTOMS
CONSTIPATION: 0
CHEST TIGHTNESS: 0
SHORTNESS OF BREATH: 0
RHINORRHEA: 0
COUGH: 0
BLOOD IN STOOL: 0
BACK PAIN: 1
ABDOMINAL PAIN: 0

## 2025-07-28 NOTE — PROGRESS NOTES
Subjective:      Patient ID: Alice Schwab is a 69 y.o. female.    HPI  Follow up on chronic medical problems.  Seeing ortho for right lower back pain with sciatica and will be starting with PT.  Also c/o right thumb getting stuck in the bent position and causing pain over the past month.  Just woke up one day with finger pain.    HTN follow up:  Compliant w/ meds, low salt diet, and exercise.    No swelling, headache or dizziness.  No chest pain, SOB, palpitations. Otherwise feeling well since the last visit.    Had negative cardiac eval for abnormal EKG and chest pain June 2023 as per cardiology.     DM type II follow up:  Compliant w/ meds, diabetic diet, and exercise.  Tolerating rybelsus.  Has helped with her appetite.    Obtains home glucose monitoring averaging 100-140.  Checks BS daily on most days and prn.  No low BS.  Pt does not have BS log at visit today.   Denies any tingling sensation, polyuria and polydipsia.  No blurred vision.  sees podiatry for foot care.   Hypercholesterolemia follow up:  Compliant w/ meds, low fat, low cholesterol diet.  Exercising some.  No muscle nor abdominal pain, no skin discoloration.    Osteoarthritis:  Patient has osteoarthritis.  Has migratory joint aching and stiffness that comes and goes.  She has been taking motrin for the pain.  Also added lyrica which has helped some.  Pain is 5/10.  Has had ALAN for pain in the back but is not wanting to get anymore injections at this time.    Symptoms onset: problem is longstanding.  Rheumatological ROS: stable, mild-to-moderate joint symptoms intermittently, reasonably well controlled by PRN meds.   Response to treatment plan: stable and intermittent.     HM:  Mammogram  12/24/24  Colonoscopy 4/25/2018 by Dr. Dill- repeat in 10 years     Past Medical History:   Diagnosis Date    DJD (degenerative joint disease) 4/10/2010    and neck    DM (diabetes mellitus) (HCC) 4/10/2010    Elevated cholesterol 4/10/2010    HTN

## 2025-07-28 NOTE — PROGRESS NOTES
Chief Complaint   Patient presents with    Medicare AWV     Patient is here today for annual exam.       \"Have you been to the ER, urgent care clinic since your last visit?  Hospitalized since your last visit?\"    NO    “Have you seen or consulted any other health care providers outside of [facility] since your last visit?”    YES, Orthopedic            Click Here for Release of Records Request      Vitals:    25 1010   BP: 126/76   Pulse: 76   Resp: 18   Temp: 98.1 °F (36.7 °C)   SpO2: 97%       Health Maintenance Due   Topic Date Due    Shingles vaccine (1 of 2) Never done    Respiratory Syncytial Virus (RSV) Pregnant or age 60 yrs+ (1 - Risk 60-74 years 1-dose series) Never done    Diabetic Alb to Cr ratio (uACR) test  2025    Annual Wellness Visit (Medicare)  2025    Diabetic retinal exam  2025        The patient, Alice Schwab, identity was verified by name and .

## 2025-07-28 NOTE — PROGRESS NOTES
Medicare Annual Wellness Visit    Alice Schwab is here for Medicare AWV (Patient is here today for annual exam.)    Assessment & Plan   Medicare annual wellness visit, subsequent  Essential (primary) hypertension  Type 2 diabetes mellitus with diabetic polyneuropathy, without long-term current use of insulin (HCC)  -     AMB POC GLUCOSE BLOOD, BY GLUCOSE MONITORING DEVICE  -     AMB POC HEMOGLOBIN A1C  -     Albumin/Creatinine Ratio, Urine; Future  Mixed hyperlipidemia  -     Lipid Panel; Future  Primary osteoarthritis involving multiple joints  Encounter for long-term (current) use of medications  -     Comprehensive Metabolic Panel; Future  -     CBC; Future  -     Urinalysis with Microscopic; Future  Non morbid obesity       Return in about 18 weeks (around 12/1/2025).     Subjective   Patient's complete Health Risk Assessment and screening values have been reviewed and are found in Flowsheets. The following problems were reviewed today and where indicated follow up appointments were made and/or referrals ordered.    Positive Risk Factor Screenings with Interventions:                Abnormal BMI (obese):  Body mass index is 37.97 kg/m². (!) Abnormal  Interventions:  See AVS for additional education material                    Objective   Vitals:    07/28/25 1010   BP: 126/76   BP Site: Left Upper Arm   Patient Position: Sitting   BP Cuff Size: Large Adult   Pulse: 76   Resp: 18   Temp: 98.1 °F (36.7 °C)   TempSrc: Temporal   SpO2: 97%   Weight: 88.2 kg (194 lb 6.4 oz)   Height: 1.524 m (5')      Body mass index is 37.97 kg/m².                 Allergies   Allergen Reactions    Latex Rash    Atorvastatin Myalgia     Other reaction(s): Other (see comments)    Benzonatate Hives and Itching    Cephalexin Nausea And Vomiting    Codeine Nausea And Vomiting    Hydrocodone-Acetaminophen Nausea And Vomiting    Oxycodone Nausea And Vomiting    Oxycodone-Acetaminophen Nausea And Vomiting    Tramadol Nausea And Vomiting

## 2025-08-07 ENCOUNTER — RESULTS FOLLOW-UP (OUTPATIENT)
Age: 70
End: 2025-08-07

## 2025-08-07 DIAGNOSIS — E87.6 HYPOKALEMIA: ICD-10-CM

## 2025-08-07 RX ORDER — POTASSIUM CHLORIDE 1500 MG/1
TABLET, EXTENDED RELEASE ORAL
Qty: 90 TABLET | Refills: 11 | Status: SHIPPED | OUTPATIENT
Start: 2025-08-07

## 2025-08-13 DIAGNOSIS — M15.0 PRIMARY OSTEOARTHRITIS INVOLVING MULTIPLE JOINTS: ICD-10-CM

## 2025-08-15 RX ORDER — PREGABALIN 50 MG/1
CAPSULE ORAL
Qty: 90 CAPSULE | Refills: 5 | Status: SHIPPED | OUTPATIENT
Start: 2025-08-15 | End: 2026-02-11